# Patient Record
Sex: MALE | Race: OTHER | HISPANIC OR LATINO | URBAN - METROPOLITAN AREA
[De-identification: names, ages, dates, MRNs, and addresses within clinical notes are randomized per-mention and may not be internally consistent; named-entity substitution may affect disease eponyms.]

---

## 2023-05-20 ENCOUNTER — INPATIENT (INPATIENT)
Facility: HOSPITAL | Age: 73
LOS: 12 days | Discharge: NURSING, REHAB OR LONG-TERM CARE | DRG: 236 | End: 2023-06-02
Attending: THORACIC SURGERY (CARDIOTHORACIC VASCULAR SURGERY) | Admitting: THORACIC SURGERY (CARDIOTHORACIC VASCULAR SURGERY)
Payer: MEDICARE

## 2023-05-20 VITALS
HEART RATE: 56 BPM | SYSTOLIC BLOOD PRESSURE: 123 MMHG | RESPIRATION RATE: 18 BRPM | OXYGEN SATURATION: 96 % | DIASTOLIC BLOOD PRESSURE: 80 MMHG | TEMPERATURE: 98 F

## 2023-05-20 DIAGNOSIS — G30.9 ALZHEIMER'S DISEASE, UNSPECIFIED: ICD-10-CM

## 2023-05-20 DIAGNOSIS — E11.65 TYPE 2 DIABETES MELLITUS WITH HYPERGLYCEMIA: ICD-10-CM

## 2023-05-20 DIAGNOSIS — K00.0 ANODONTIA: ICD-10-CM

## 2023-05-20 DIAGNOSIS — Z86.73 PERSONAL HISTORY OF TRANSIENT ISCHEMIC ATTACK (TIA), AND CEREBRAL INFARCTION WITHOUT RESIDUAL DEFICITS: ICD-10-CM

## 2023-05-20 DIAGNOSIS — F05 DELIRIUM DUE TO KNOWN PHYSIOLOGICAL CONDITION: ICD-10-CM

## 2023-05-20 DIAGNOSIS — I25.10 ATHEROSCLEROTIC HEART DISEASE OF NATIVE CORONARY ARTERY WITHOUT ANGINA PECTORIS: ICD-10-CM

## 2023-05-20 DIAGNOSIS — I21.4 NON-ST ELEVATION (NSTEMI) MYOCARDIAL INFARCTION: ICD-10-CM

## 2023-05-20 DIAGNOSIS — F02.80 DEMENTIA IN OTHER DISEASES CLASSIFIED ELSEWHERE, UNSPECIFIED SEVERITY, WITHOUT BEHAVIORAL DISTURBANCE, PSYCHOTIC DISTURBANCE, MOOD DISTURBANCE, AND ANXIETY: ICD-10-CM

## 2023-05-20 DIAGNOSIS — Z87.891 PERSONAL HISTORY OF NICOTINE DEPENDENCE: ICD-10-CM

## 2023-05-20 DIAGNOSIS — E78.5 HYPERLIPIDEMIA, UNSPECIFIED: ICD-10-CM

## 2023-05-20 DIAGNOSIS — Z79.02 LONG TERM (CURRENT) USE OF ANTITHROMBOTICS/ANTIPLATELETS: ICD-10-CM

## 2023-05-20 DIAGNOSIS — E83.42 HYPOMAGNESEMIA: ICD-10-CM

## 2023-05-20 DIAGNOSIS — E87.20 ACIDOSIS, UNSPECIFIED: ICD-10-CM

## 2023-05-20 DIAGNOSIS — R00.1 BRADYCARDIA, UNSPECIFIED: ICD-10-CM

## 2023-05-20 DIAGNOSIS — Z79.4 LONG TERM (CURRENT) USE OF INSULIN: ICD-10-CM

## 2023-05-20 DIAGNOSIS — I10 ESSENTIAL (PRIMARY) HYPERTENSION: ICD-10-CM

## 2023-05-20 DIAGNOSIS — I48.91 UNSPECIFIED ATRIAL FIBRILLATION: ICD-10-CM

## 2023-05-20 DIAGNOSIS — G47.00 INSOMNIA, UNSPECIFIED: ICD-10-CM

## 2023-05-20 LAB
A1C WITH ESTIMATED AVERAGE GLUCOSE RESULT: 10.2 % — HIGH (ref 4–5.6)
ALBUMIN SERPL ELPH-MCNC: 4 G/DL — SIGNIFICANT CHANGE UP (ref 3.3–5)
ALP SERPL-CCNC: 90 U/L — SIGNIFICANT CHANGE UP (ref 40–120)
ALT FLD-CCNC: SIGNIFICANT CHANGE UP (ref 10–45)
ANION GAP SERPL CALC-SCNC: 9 MMOL/L — SIGNIFICANT CHANGE UP (ref 5–17)
APPEARANCE UR: CLEAR — SIGNIFICANT CHANGE UP
APTT BLD: 63.3 SEC — HIGH (ref 27.5–35.5)
APTT BLD: 85.3 SEC — HIGH (ref 27.5–35.5)
AST SERPL-CCNC: SIGNIFICANT CHANGE UP (ref 10–40)
BASOPHILS # BLD AUTO: 0.11 K/UL — SIGNIFICANT CHANGE UP (ref 0–0.2)
BASOPHILS NFR BLD AUTO: 1 % — SIGNIFICANT CHANGE UP (ref 0–2)
BILIRUB SERPL-MCNC: 0.6 MG/DL — SIGNIFICANT CHANGE UP (ref 0.2–1.2)
BILIRUB UR-MCNC: NEGATIVE — SIGNIFICANT CHANGE UP
BLD GP AB SCN SERPL QL: NEGATIVE — SIGNIFICANT CHANGE UP
BLD GP AB SCN SERPL QL: NEGATIVE — SIGNIFICANT CHANGE UP
BUN SERPL-MCNC: 23 MG/DL — SIGNIFICANT CHANGE UP (ref 7–23)
CALCIUM SERPL-MCNC: 9.1 MG/DL — SIGNIFICANT CHANGE UP (ref 8.4–10.5)
CHLORIDE SERPL-SCNC: 105 MMOL/L — SIGNIFICANT CHANGE UP (ref 96–108)
CO2 SERPL-SCNC: 26 MMOL/L — SIGNIFICANT CHANGE UP (ref 22–31)
COLOR SPEC: YELLOW — SIGNIFICANT CHANGE UP
CREAT SERPL-MCNC: 1.1 MG/DL — SIGNIFICANT CHANGE UP (ref 0.5–1.3)
DIFF PNL FLD: NEGATIVE — SIGNIFICANT CHANGE UP
EGFR: 71 ML/MIN/1.73M2 — SIGNIFICANT CHANGE UP
EOSINOPHIL # BLD AUTO: 0.43 K/UL — SIGNIFICANT CHANGE UP (ref 0–0.5)
EOSINOPHIL NFR BLD AUTO: 4.1 % — SIGNIFICANT CHANGE UP (ref 0–6)
ESTIMATED AVERAGE GLUCOSE: 246 MG/DL — HIGH (ref 68–114)
GLUCOSE BLDC GLUCOMTR-MCNC: 282 MG/DL — HIGH (ref 70–99)
GLUCOSE BLDC GLUCOMTR-MCNC: 309 MG/DL — HIGH (ref 70–99)
GLUCOSE SERPL-MCNC: 292 MG/DL — HIGH (ref 70–99)
GLUCOSE UR QL: >=1000
HCT VFR BLD CALC: 49.4 % — SIGNIFICANT CHANGE UP (ref 39–50)
HGB BLD-MCNC: 16.1 G/DL — SIGNIFICANT CHANGE UP (ref 13–17)
IMM GRANULOCYTES NFR BLD AUTO: 0.4 % — SIGNIFICANT CHANGE UP (ref 0–0.9)
INR BLD: 0.93 — SIGNIFICANT CHANGE UP (ref 0.88–1.16)
KETONES UR-MCNC: 40 MG/DL
LEUKOCYTE ESTERASE UR-ACNC: NEGATIVE — SIGNIFICANT CHANGE UP
LYMPHOCYTES # BLD AUTO: 29.6 % — SIGNIFICANT CHANGE UP (ref 13–44)
LYMPHOCYTES # BLD AUTO: 3.12 K/UL — SIGNIFICANT CHANGE UP (ref 1–3.3)
MCHC RBC-ENTMCNC: 29 PG — SIGNIFICANT CHANGE UP (ref 27–34)
MCHC RBC-ENTMCNC: 32.6 GM/DL — SIGNIFICANT CHANGE UP (ref 32–36)
MCV RBC AUTO: 88.8 FL — SIGNIFICANT CHANGE UP (ref 80–100)
MONOCYTES # BLD AUTO: 0.67 K/UL — SIGNIFICANT CHANGE UP (ref 0–0.9)
MONOCYTES NFR BLD AUTO: 6.4 % — SIGNIFICANT CHANGE UP (ref 2–14)
NEUTROPHILS # BLD AUTO: 6.18 K/UL — SIGNIFICANT CHANGE UP (ref 1.8–7.4)
NEUTROPHILS NFR BLD AUTO: 58.5 % — SIGNIFICANT CHANGE UP (ref 43–77)
NITRITE UR-MCNC: NEGATIVE — SIGNIFICANT CHANGE UP
NRBC # BLD: 0 /100 WBCS — SIGNIFICANT CHANGE UP (ref 0–0)
NT-PROBNP SERPL-SCNC: 466 PG/ML — HIGH (ref 0–300)
PH UR: 6 — SIGNIFICANT CHANGE UP (ref 5–8)
PLATELET # BLD AUTO: 221 K/UL — SIGNIFICANT CHANGE UP (ref 150–400)
POTASSIUM SERPL-MCNC: SIGNIFICANT CHANGE UP (ref 3.5–5.3)
POTASSIUM SERPL-SCNC: SIGNIFICANT CHANGE UP (ref 3.5–5.3)
PROT SERPL-MCNC: 6.8 G/DL — SIGNIFICANT CHANGE UP (ref 6–8.3)
PROT UR-MCNC: NEGATIVE MG/DL — SIGNIFICANT CHANGE UP
PROTHROM AB SERPL-ACNC: 11.1 SEC — SIGNIFICANT CHANGE UP (ref 10.5–13.4)
RBC # BLD: 5.56 M/UL — SIGNIFICANT CHANGE UP (ref 4.2–5.8)
RBC # FLD: 13.6 % — SIGNIFICANT CHANGE UP (ref 10.3–14.5)
RH IG SCN BLD-IMP: POSITIVE — SIGNIFICANT CHANGE UP
RH IG SCN BLD-IMP: POSITIVE — SIGNIFICANT CHANGE UP
SODIUM SERPL-SCNC: 140 MMOL/L — SIGNIFICANT CHANGE UP (ref 135–145)
SP GR SPEC: 1.02 — SIGNIFICANT CHANGE UP (ref 1–1.03)
TROPONIN T SERPL-MCNC: 0.34 NG/ML — CRITICAL HIGH (ref 0–0.01)
TSH SERPL-MCNC: 1.2 UIU/ML — SIGNIFICANT CHANGE UP (ref 0.27–4.2)
UROBILINOGEN FLD QL: 1 E.U./DL — SIGNIFICANT CHANGE UP
WBC # BLD: 10.55 K/UL — HIGH (ref 3.8–10.5)
WBC # FLD AUTO: 10.55 K/UL — HIGH (ref 3.8–10.5)

## 2023-05-20 PROCEDURE — 71045 X-RAY EXAM CHEST 1 VIEW: CPT | Mod: 26

## 2023-05-20 PROCEDURE — 70450 CT HEAD/BRAIN W/O DYE: CPT | Mod: 26

## 2023-05-20 RX ORDER — DEXTROSE 50 % IN WATER 50 %
15 SYRINGE (ML) INTRAVENOUS ONCE
Refills: 0 | Status: DISCONTINUED | OUTPATIENT
Start: 2023-05-20 | End: 2023-05-24

## 2023-05-20 RX ORDER — HEPARIN SODIUM 5000 [USP'U]/ML
5000 INJECTION INTRAVENOUS; SUBCUTANEOUS EVERY 8 HOURS
Refills: 0 | Status: DISCONTINUED | OUTPATIENT
Start: 2023-05-20 | End: 2023-05-20

## 2023-05-20 RX ORDER — ATORVASTATIN CALCIUM 80 MG/1
40 TABLET, FILM COATED ORAL AT BEDTIME
Refills: 0 | Status: DISCONTINUED | OUTPATIENT
Start: 2023-05-20 | End: 2023-05-24

## 2023-05-20 RX ORDER — INSULIN GLARGINE 100 [IU]/ML
10 INJECTION, SOLUTION SUBCUTANEOUS ONCE
Refills: 0 | Status: DISCONTINUED | OUTPATIENT
Start: 2023-05-20 | End: 2023-05-21

## 2023-05-20 RX ORDER — INSULIN LISPRO 100/ML
3 VIAL (ML) SUBCUTANEOUS
Refills: 0 | Status: DISCONTINUED | OUTPATIENT
Start: 2023-05-20 | End: 2023-05-21

## 2023-05-20 RX ORDER — DEXTROSE 50 % IN WATER 50 %
12.5 SYRINGE (ML) INTRAVENOUS ONCE
Refills: 0 | Status: DISCONTINUED | OUTPATIENT
Start: 2023-05-20 | End: 2023-05-24

## 2023-05-20 RX ORDER — ASPIRIN/CALCIUM CARB/MAGNESIUM 324 MG
81 TABLET ORAL DAILY
Refills: 0 | Status: DISCONTINUED | OUTPATIENT
Start: 2023-05-20 | End: 2023-05-24

## 2023-05-20 RX ORDER — INSULIN GLARGINE 100 [IU]/ML
10 INJECTION, SOLUTION SUBCUTANEOUS AT BEDTIME
Refills: 0 | Status: DISCONTINUED | OUTPATIENT
Start: 2023-05-20 | End: 2023-05-20

## 2023-05-20 RX ORDER — PANTOPRAZOLE SODIUM 20 MG/1
40 TABLET, DELAYED RELEASE ORAL
Refills: 0 | Status: DISCONTINUED | OUTPATIENT
Start: 2023-05-20 | End: 2023-05-24

## 2023-05-20 RX ORDER — HEPARIN SODIUM 5000 [USP'U]/ML
680 INJECTION INTRAVENOUS; SUBCUTANEOUS
Qty: 25000 | Refills: 0 | Status: DISCONTINUED | OUTPATIENT
Start: 2023-05-20 | End: 2023-05-24

## 2023-05-20 RX ORDER — SODIUM CHLORIDE 9 MG/ML
1000 INJECTION, SOLUTION INTRAVENOUS
Refills: 0 | Status: DISCONTINUED | OUTPATIENT
Start: 2023-05-20 | End: 2023-05-24

## 2023-05-20 RX ORDER — SODIUM CHLORIDE 9 MG/ML
3 INJECTION INTRAMUSCULAR; INTRAVENOUS; SUBCUTANEOUS EVERY 8 HOURS
Refills: 0 | Status: DISCONTINUED | OUTPATIENT
Start: 2023-05-20 | End: 2023-05-24

## 2023-05-20 RX ORDER — INSULIN GLARGINE 100 [IU]/ML
20 INJECTION, SOLUTION SUBCUTANEOUS AT BEDTIME
Refills: 0 | Status: DISCONTINUED | OUTPATIENT
Start: 2023-05-21 | End: 2023-05-21

## 2023-05-20 RX ORDER — DEXTROSE 50 % IN WATER 50 %
25 SYRINGE (ML) INTRAVENOUS ONCE
Refills: 0 | Status: DISCONTINUED | OUTPATIENT
Start: 2023-05-20 | End: 2023-05-24

## 2023-05-20 RX ORDER — GLUCAGON INJECTION, SOLUTION 0.5 MG/.1ML
1 INJECTION, SOLUTION SUBCUTANEOUS ONCE
Refills: 0 | Status: DISCONTINUED | OUTPATIENT
Start: 2023-05-20 | End: 2023-05-24

## 2023-05-20 RX ORDER — NITROGLYCERIN 6.5 MG
0.4 CAPSULE, EXTENDED RELEASE ORAL
Refills: 0 | Status: DISCONTINUED | OUTPATIENT
Start: 2023-05-20 | End: 2023-05-24

## 2023-05-20 RX ORDER — ISOSORBIDE MONONITRATE 60 MG/1
30 TABLET, EXTENDED RELEASE ORAL DAILY
Refills: 0 | Status: DISCONTINUED | OUTPATIENT
Start: 2023-05-20 | End: 2023-05-24

## 2023-05-20 RX ORDER — BNT162B2 ORIGINAL AND OMICRON BA.4/BA.5 .1125; .1125 MG/2.25ML; MG/2.25ML
0.3 INJECTION, SUSPENSION INTRAMUSCULAR ONCE
Refills: 0 | Status: DISCONTINUED | OUTPATIENT
Start: 2023-05-20 | End: 2023-05-20

## 2023-05-20 RX ORDER — ISOSORBIDE MONONITRATE 60 MG/1
30 TABLET, EXTENDED RELEASE ORAL DAILY
Refills: 0 | Status: DISCONTINUED | OUTPATIENT
Start: 2023-05-20 | End: 2023-05-20

## 2023-05-20 RX ADMIN — INSULIN GLARGINE 10 UNIT(S): 100 INJECTION, SOLUTION SUBCUTANEOUS at 22:18

## 2023-05-20 RX ADMIN — ATORVASTATIN CALCIUM 40 MILLIGRAM(S): 80 TABLET, FILM COATED ORAL at 21:51

## 2023-05-20 RX ADMIN — ISOSORBIDE MONONITRATE 30 MILLIGRAM(S): 60 TABLET, EXTENDED RELEASE ORAL at 19:14

## 2023-05-20 RX ADMIN — SODIUM CHLORIDE 3 MILLILITER(S): 9 INJECTION INTRAMUSCULAR; INTRAVENOUS; SUBCUTANEOUS at 22:07

## 2023-05-20 NOTE — H&P ADULT - NSHPSOCIALHISTORY_GEN_ALL_CORE
Former smoker quit 20 years ago, smoked 1PPD x 10-15 years ago   former social etoh  ambulates without walker/cane

## 2023-05-20 NOTE — H&P ADULT - NSHPREVIEWOFSYSTEMS_GEN_ALL_CORE
Review of Systems  CONSTITUTIONAL:  Denies Fevers / chills, sweats, fatigue, weight loss, weight gain                                      NEURO:  Denies parathesias, seizures, syncope, confusion                                                                                EYES:  Denies Blurry vision, discharge, pain, loss of vision                                                                                    ENMT:  Denies Difficulty hearing, vertigo, dysphagia, epistaxis, recent dental work                                       CV:  Denies Chest pain, palpitations, BRYSON, orthopnea                                                                                          RESPIRATORY:  Denies Wheezing, SOB, cough / sputum, hemoptysis                                                                GI:  Denies Nausea, vommiting, diarrhea, constipation, melena, difficulty swallowing                                               : Denies Hematuria, dysuria, urgency, incontinence                                                                                         MUSKULOSKELETAL:  Denies arthritis, joint swelling, muscle weakness                                                             SKIN/BREAST:  Denies rash, itching, betty loss, masses                                                                                            PSYCH:  Denies depresion, anxiety, suicidal ideation                                                                                               HEME/LYMPH:  Denies bruises easily, enlarged lymph nodes, tender lymph nodes                                        ENDOCRINE:  Denies cold intolerance, heat intolerance, polydipsia

## 2023-05-20 NOTE — PATIENT PROFILE ADULT - FALL HARM RISK - HARM RISK INTERVENTIONS
Communicate Risk of Fall with Harm to all staff/Reinforce activity limits and safety measures with patient and family/Tailored Fall Risk Interventions/Visual Cue: Yellow wristband and red socks/Bed in lowest position, wheels locked, appropriate side rails in place/Call bell, personal items and telephone in reach/Instruct patient to call for assistance before getting out of bed or chair/Non-slip footwear when patient is out of bed/[place] to call system/Physically safe environment - no spills, clutter or unnecessary equipment/Purposeful Proactive Rounding/Room/bathroom lighting operational, light cord in reach Purse String (Simple) Text: Given the location of the defect and the characteristics of the surrounding skin a purse string closure was deemed most appropriate.  Undermining was performed circumfirentially around the surgical defect.  A purse string suture was then placed and tightened.

## 2023-05-20 NOTE — H&P ADULT - HISTORY OF PRESENT ILLNESS
66yo M former smoker  with PMHx of HTN, HLD, DM, CVA (2106) no residual deficits, Alzheimers, who presented to  with CP at rest. Pt's wife states he ambulated minimally around the house and develops SOB after walking 1 block. At [facility] he was r/i NSTEMI and underwent cardiac cath revealing mv CAD. He was started on a heparin gtt and transferred to [facility] under Dr Martinez for pre-op CABG workup. Denies GI bleed, hx of CA, radiation, varicose veins, vein stripping.     Of note CTA brain showed diminutive R ICA without stenosis.,

## 2023-05-20 NOTE — H&P ADULT - ASSESSMENT
66yo M former smoker  with PMHx of HTN, HLD, DM, CVA (2106) no residual deficits, Alzheimers, who presented to  with CP at rest. Pt's wife states he ambulated minimally around the house and develops SOB after walking 1 block. At [facility] he was r/i NSTEMI and underwent cardiac cath revealing mv CAD. He was started on a heparin gtt and transferred to [facility] under Dr Martinez for pre-op CABG workup.    Neuro:   - Alzheimer's disease, alert and oriented but forgetful  - Previous CVA no residual deficit  - CTA brain 2016 showed diminutive R ICA without stenosis.,   - Carotid dopplers     Respiratory  - Oxygenating well on ra   - CXR showing clear lung fields  - PFTS  - Encourage ambulation C+DB and use of IS 10x / hr while awake    Cardiac  -mv CAD +NSTEMI, conitnue asp,statin, BB, heparin gtt  - HTN: Continue BB and imdur titrate as needed  - HLD: statin   - monitor r/bp/tele    GI:   - DASH/CC diet   - Continue bowel regimen  - GI PPX with protonix     Renal/:   - Monitor renal function BUN/Cr 23/1.1  - Monitor I/Os  - Replete K<4.0, Mg<2.0    Heme:  - H&H stable 16/49, continue to monitor   - DVT ppx with hep gtt     Endocrinology  - A1C 10, needs endo consult started lantus 10, lispro 3 TID  - TSH WNL      ID: afebrile WBC 10  - f/u UA   - Observe for SIRS/Sepsis Syndrome    Dispo: Home when medically cleared

## 2023-05-20 NOTE — H&P ADULT - NSHPPHYSICALEXAM_GEN_ALL_CORE
General: Lying in bed  NAD  HEENT: NCAT MMM EOMI neck supple   Neurological: A&O x3, no focal deficits, strength 5/5 throughout in UE and LE   Cardiovascular: RRR, normal s1 s2, no M/R/G  Respiratory:  CTA b/l, no W/R/R  Gastrointestinal: soft, non-tender to palpation, non-distended  Extremities: WWP, no pitting edema, no calf tenderness  Vascular: , 2+DP pulses b/l  Incisions: R radial cath site CDI no hematoma

## 2023-05-20 NOTE — PATIENT PROFILE ADULT - PATIENT'S SEXUAL ORIENTATION
11/30/2020      Nasim Lawrence  [address]  [facility] [address]      To: Jury Manager    Nasim Lawrence is an established patient with Raul Washington MD. Please excuse Nasim Lawrence from jury duty due to medical reasons.              Raul Washington MD   [address]  [place], [place] [id number] [phone]     Withheld/decline to answer

## 2023-05-20 NOTE — H&P ADULT - NSICDXPASTMEDICALHX_GEN_ALL_CORE_FT
PAST MEDICAL HISTORY:  AD (Alzheimer's disease)     CVA (cerebrovascular accident)     Diabetes     HLD (hyperlipidemia)     HTN (hypertension)

## 2023-05-21 LAB
ALBUMIN SERPL ELPH-MCNC: 3.8 G/DL — SIGNIFICANT CHANGE UP (ref 3.3–5)
ALP SERPL-CCNC: 89 U/L — SIGNIFICANT CHANGE UP (ref 40–120)
ALT FLD-CCNC: 19 U/L — SIGNIFICANT CHANGE UP (ref 10–45)
ANION GAP SERPL CALC-SCNC: 11 MMOL/L — SIGNIFICANT CHANGE UP (ref 5–17)
APTT BLD: 82.6 SEC — HIGH (ref 27.5–35.5)
AST SERPL-CCNC: 23 U/L — SIGNIFICANT CHANGE UP (ref 10–40)
BASOPHILS # BLD AUTO: 0.12 K/UL — SIGNIFICANT CHANGE UP (ref 0–0.2)
BASOPHILS NFR BLD AUTO: 1.3 % — SIGNIFICANT CHANGE UP (ref 0–2)
BILIRUB SERPL-MCNC: 0.5 MG/DL — SIGNIFICANT CHANGE UP (ref 0.2–1.2)
BUN SERPL-MCNC: 26 MG/DL — HIGH (ref 7–23)
CALCIUM SERPL-MCNC: 8.9 MG/DL — SIGNIFICANT CHANGE UP (ref 8.4–10.5)
CHLORIDE SERPL-SCNC: 106 MMOL/L — SIGNIFICANT CHANGE UP (ref 96–108)
CHOLEST SERPL-MCNC: 91 MG/DL — SIGNIFICANT CHANGE UP
CO2 SERPL-SCNC: 22 MMOL/L — SIGNIFICANT CHANGE UP (ref 22–31)
CREAT SERPL-MCNC: 1.13 MG/DL — SIGNIFICANT CHANGE UP (ref 0.5–1.3)
EGFR: 69 ML/MIN/1.73M2 — SIGNIFICANT CHANGE UP
EOSINOPHIL # BLD AUTO: 0.49 K/UL — SIGNIFICANT CHANGE UP (ref 0–0.5)
EOSINOPHIL NFR BLD AUTO: 5.2 % — SIGNIFICANT CHANGE UP (ref 0–6)
GLUCOSE BLDC GLUCOMTR-MCNC: 210 MG/DL — HIGH (ref 70–99)
GLUCOSE BLDC GLUCOMTR-MCNC: 237 MG/DL — HIGH (ref 70–99)
GLUCOSE BLDC GLUCOMTR-MCNC: 249 MG/DL — HIGH (ref 70–99)
GLUCOSE BLDC GLUCOMTR-MCNC: 261 MG/DL — HIGH (ref 70–99)
GLUCOSE SERPL-MCNC: 287 MG/DL — HIGH (ref 70–99)
HCT VFR BLD CALC: 47 % — SIGNIFICANT CHANGE UP (ref 39–50)
HCV AB S/CO SERPL IA: 0.09 S/CO — SIGNIFICANT CHANGE UP (ref 0–0.99)
HCV AB SERPL-IMP: SIGNIFICANT CHANGE UP
HDLC SERPL-MCNC: 38 MG/DL — LOW
HGB BLD-MCNC: 15.2 G/DL — SIGNIFICANT CHANGE UP (ref 13–17)
IMM GRANULOCYTES NFR BLD AUTO: 0.4 % — SIGNIFICANT CHANGE UP (ref 0–0.9)
LIPID PNL WITH DIRECT LDL SERPL: 37 MG/DL — SIGNIFICANT CHANGE UP
LYMPHOCYTES # BLD AUTO: 3.3 K/UL — SIGNIFICANT CHANGE UP (ref 1–3.3)
LYMPHOCYTES # BLD AUTO: 34.8 % — SIGNIFICANT CHANGE UP (ref 13–44)
MCHC RBC-ENTMCNC: 29.1 PG — SIGNIFICANT CHANGE UP (ref 27–34)
MCHC RBC-ENTMCNC: 32.3 GM/DL — SIGNIFICANT CHANGE UP (ref 32–36)
MCV RBC AUTO: 90 FL — SIGNIFICANT CHANGE UP (ref 80–100)
MONOCYTES # BLD AUTO: 0.54 K/UL — SIGNIFICANT CHANGE UP (ref 0–0.9)
MONOCYTES NFR BLD AUTO: 5.7 % — SIGNIFICANT CHANGE UP (ref 2–14)
NEUTROPHILS # BLD AUTO: 4.99 K/UL — SIGNIFICANT CHANGE UP (ref 1.8–7.4)
NEUTROPHILS NFR BLD AUTO: 52.6 % — SIGNIFICANT CHANGE UP (ref 43–77)
NON HDL CHOLESTEROL: 53 MG/DL — SIGNIFICANT CHANGE UP
NRBC # BLD: 0 /100 WBCS — SIGNIFICANT CHANGE UP (ref 0–0)
PA ADP PRP-ACNC: 72 PRU — LOW (ref 194–418)
PLATELET # BLD AUTO: 218 K/UL — SIGNIFICANT CHANGE UP (ref 150–400)
POTASSIUM SERPL-MCNC: 4.3 MMOL/L — SIGNIFICANT CHANGE UP (ref 3.5–5.3)
POTASSIUM SERPL-SCNC: 4.3 MMOL/L — SIGNIFICANT CHANGE UP (ref 3.5–5.3)
PROT SERPL-MCNC: 6.5 G/DL — SIGNIFICANT CHANGE UP (ref 6–8.3)
RBC # BLD: 5.22 M/UL — SIGNIFICANT CHANGE UP (ref 4.2–5.8)
RBC # FLD: 13.6 % — SIGNIFICANT CHANGE UP (ref 10.3–14.5)
SODIUM SERPL-SCNC: 139 MMOL/L — SIGNIFICANT CHANGE UP (ref 135–145)
TRIGL SERPL-MCNC: 81 MG/DL — SIGNIFICANT CHANGE UP
TROPONIN T SERPL-MCNC: 0.35 NG/ML — CRITICAL HIGH (ref 0–0.01)
WBC # BLD: 9.48 K/UL — SIGNIFICANT CHANGE UP (ref 3.8–10.5)
WBC # FLD AUTO: 9.48 K/UL — SIGNIFICANT CHANGE UP (ref 3.8–10.5)

## 2023-05-21 PROCEDURE — 99232 SBSQ HOSP IP/OBS MODERATE 35: CPT

## 2023-05-21 PROCEDURE — 93880 EXTRACRANIAL BILAT STUDY: CPT | Mod: 26

## 2023-05-21 RX ORDER — INSULIN LISPRO 100/ML
10 VIAL (ML) SUBCUTANEOUS
Refills: 0 | Status: DISCONTINUED | OUTPATIENT
Start: 2023-05-21 | End: 2023-05-24

## 2023-05-21 RX ORDER — INSULIN LISPRO 100/ML
VIAL (ML) SUBCUTANEOUS
Refills: 0 | Status: DISCONTINUED | OUTPATIENT
Start: 2023-05-21 | End: 2023-05-24

## 2023-05-21 RX ORDER — SENNA PLUS 8.6 MG/1
2 TABLET ORAL AT BEDTIME
Refills: 0 | Status: DISCONTINUED | OUTPATIENT
Start: 2023-05-21 | End: 2023-05-24

## 2023-05-21 RX ORDER — INSULIN LISPRO 100/ML
5 VIAL (ML) SUBCUTANEOUS
Refills: 0 | Status: DISCONTINUED | OUTPATIENT
Start: 2023-05-21 | End: 2023-05-21

## 2023-05-21 RX ORDER — INSULIN GLARGINE 100 [IU]/ML
30 INJECTION, SOLUTION SUBCUTANEOUS AT BEDTIME
Refills: 0 | Status: DISCONTINUED | OUTPATIENT
Start: 2023-05-21 | End: 2023-05-24

## 2023-05-21 RX ADMIN — INSULIN GLARGINE 10 UNIT(S): 100 INJECTION, SOLUTION SUBCUTANEOUS at 00:16

## 2023-05-21 RX ADMIN — HEPARIN SODIUM 6.8 UNIT(S)/HR: 5000 INJECTION INTRAVENOUS; SUBCUTANEOUS at 09:04

## 2023-05-21 RX ADMIN — SODIUM CHLORIDE 3 MILLILITER(S): 9 INJECTION INTRAMUSCULAR; INTRAVENOUS; SUBCUTANEOUS at 06:06

## 2023-05-21 RX ADMIN — SODIUM CHLORIDE 3 MILLILITER(S): 9 INJECTION INTRAMUSCULAR; INTRAVENOUS; SUBCUTANEOUS at 22:58

## 2023-05-21 RX ADMIN — Medication 3 UNIT(S): at 07:21

## 2023-05-21 RX ADMIN — Medication 10 UNIT(S): at 17:03

## 2023-05-21 RX ADMIN — INSULIN GLARGINE 30 UNIT(S): 100 INJECTION, SOLUTION SUBCUTANEOUS at 22:58

## 2023-05-21 RX ADMIN — SODIUM CHLORIDE 3 MILLILITER(S): 9 INJECTION INTRAMUSCULAR; INTRAVENOUS; SUBCUTANEOUS at 13:35

## 2023-05-21 RX ADMIN — PANTOPRAZOLE SODIUM 40 MILLIGRAM(S): 20 TABLET, DELAYED RELEASE ORAL at 07:21

## 2023-05-21 RX ADMIN — ISOSORBIDE MONONITRATE 30 MILLIGRAM(S): 60 TABLET, EXTENDED RELEASE ORAL at 12:10

## 2023-05-21 RX ADMIN — Medication 81 MILLIGRAM(S): at 12:10

## 2023-05-21 RX ADMIN — Medication 4: at 17:03

## 2023-05-21 RX ADMIN — Medication 10 UNIT(S): at 12:09

## 2023-05-21 RX ADMIN — ATORVASTATIN CALCIUM 40 MILLIGRAM(S): 80 TABLET, FILM COATED ORAL at 22:47

## 2023-05-21 NOTE — PROGRESS NOTE ADULT - SUBJECTIVE AND OBJECTIVE BOX
Patient discussed on morning rounds with Dr. Lpiscomb     Operation / Date: Pre-op CABG     SUBJECTIVE ASSESSMENT:  72y Male  Seen and examined at the bedside. He does not remember that he was at [facility] or that he needs heart surgery. He is comfortable, denies CP, sob, palpitations, abd pain, n/v         Vital Signs Last 24 Hrs  T(C): 35.7 (21 May 2023 05:01), Max: 36.6 (20 May 2023 14:04)  T(F): 96.3 (21 May 2023 05:01), Max: 97.8 (20 May 2023 14:04)  HR: 72 (21 May 2023 08:41) (48 - 72)  BP: 99/55 (21 May 2023 08:41) (99/55 - 124/58)  BP(mean): 71 (21 May 2023 08:41) (71 - 95)  RR: 16 (21 May 2023 08:41) (16 - 18)  SpO2: 94% (21 May 2023 08:41) (94% - 96%)    Parameters below as of 21 May 2023 08:41  Patient On (Oxygen Delivery Method): room air      I&O's Detail    20 May 2023 07:01  -  21 May 2023 07:00  --------------------------------------------------------  IN:    Heparin: 74.8 mL  Total IN: 74.8 mL    OUT:  Total OUT: 0 mL    Total NET: 74.8 mL      21 May 2023 07:01  -  21 May 2023 10:45  --------------------------------------------------------  IN:    Heparin: 6.8 mL  Total IN: 6.8 mL    OUT:  Total OUT: 0 mL    Total NET: 6.8 mL          CHEST TUBE:  no  LYNETTE DRAIN:  No.  EPICARDIAL WIRES: No.  TIE DOWNS: No.  AVILEZ: No.    PHYSICAL EXAM:  General: Laying in bed NAD  HEENT: NCAT MMM EOMI neck supple   Neurological: forgetful this morning about recent events, otherwise alert and oriented, RÍOS   Cardiovascular: RRR, normal s1 s2, no M/R/G  Respiratory: CTA b/l, no W/R/R  Gastrointestinal:  soft, non-tender to palpation, non-distended  Extremities: WWP, no pitting edema, no calf tenderness  Vascular: 2+radial pulses b/l  Skin: No rash     LABS:                        15.2   9.48  )-----------( 218      ( 21 May 2023 07:05 )             47.0       COUMADIN:  no  PT/INR - ( 20 May 2023 14:21 )   PT: 11.1 sec;   INR: 0.93          PTT - ( 21 May 2023 06:05 )  PTT:82.6 sec        139  |  106  |  26<H>  ----------------------------<  287<H>  4.3   |  22  |  1.13    Ca    8.9      21 May 2023 07:05    TPro  6.5  /  Alb  3.8  /  TBili  0.5  /  DBili  x   /  AST  23  /  ALT  19  /  AlkPhos  89  -21      Urinalysis Basic - ( 20 May 2023 14:21 )    Color: Yellow / Appearance: Clear / S.020 / pH: x  Gluc: x / Ketone: 40 mg/dL  / Bili: Negative / Urobili: 1.0 E.U./dL   Blood: x / Protein: NEGATIVE mg/dL / Nitrite: NEGATIVE   Leuk Esterase: NEGATIVE / RBC: x / WBC x   Sq Epi: x / Non Sq Epi: x / Bacteria: x        MEDICATIONS  (STANDING):  aspirin enteric coated 81 milliGRAM(s) Oral daily  atorvastatin 40 milliGRAM(s) Oral at bedtime  dextrose 5%. 1000 milliLiter(s) (100 mL/Hr) IV Continuous <Continuous>  dextrose 5%. 1000 milliLiter(s) (50 mL/Hr) IV Continuous <Continuous>  dextrose 50% Injectable 25 Gram(s) IV Push once  dextrose 50% Injectable 12.5 Gram(s) IV Push once  dextrose 50% Injectable 25 Gram(s) IV Push once  glucagon  Injectable 1 milliGRAM(s) IntraMuscular once  heparin  Infusion 680 Unit(s)/Hr (6.8 mL/Hr) IV Continuous <Continuous>  insulin glargine Injectable (LANTUS) 20 Unit(s) SubCutaneous at bedtime  insulin lispro Injectable (ADMELOG) 5 Unit(s) SubCutaneous three times a day before meals  isosorbide   mononitrate ER Tablet (IMDUR) 30 milliGRAM(s) Oral daily  pantoprazole    Tablet 40 milliGRAM(s) Oral before breakfast  sodium chloride 0.9% lock flush 3 milliLiter(s) IV Push every 8 hours    MEDICATIONS  (PRN):  dextrose Oral Gel 15 Gram(s) Oral once PRN Blood Glucose LESS THAN 70 milliGRAM(s)/deciliter  nitroglycerin     SubLingual 0.4 milliGRAM(s) SubLingual every 5 minutes PRN Chest Pain        RADIOLOGY & ADDITIONAL TESTS:    < from: CT Head No Cont (23 @ 17:12) >  Impression: No acute intracranial injury. Right frontal chronic   infarction. Right occipital chronic infarction. Tiny left superior   cerebellar chronic infarct. Mild to moderate microvascular disease.    < end of copied text >

## 2023-05-21 NOTE — CONSULT NOTE ADULT - ASSESSMENT
A/P:  Uncontrolled DM2, w/ A1C at 10.2%. Plans for pre-CABG evaluation  1) Increase LAntus to 30 units daily, increase Lispro to 10 units TIDAC, hold if not eating + NISS  2) Endocrine to follow    T [id number]

## 2023-05-21 NOTE — CONSULT NOTE ADULT - SUBJECTIVE AND OBJECTIVE BOX
73 y/o M former smoker  with PMHx of HTN, HLD, DM, CVA (2106) no residual deficits, Alzheimers, who presented to  with CP at rest. Pt's wife states he ambulated minimally around the house and develops SOB after walking 1 block. At [facility] he was r/i NSTEMI and underwent cardiac cath revealing mv CAD. He was started on a heparin gtt and transferred to [facility] under Dr Martinez for pre-op CABG workup. Pt is unable to provide history due to dementia, he is aware of hx of diabetes and insulin use. Family provides insulin and meds at home. Last regimen at home consisted of Levemir 50 units QHS + Novolog SS. Reports conserved appetite and no present digestive issues. Consumed whole food tray today. Otherwise denies any f/c, CP, SOB, palpitations, N/V, abd tenderness or other concerns. Plan for CABG this week. Provided w/ LAntus 20 units last night and 5 units of lispro before breakfast    PAST MEDICAL & SURGICAL HISTORY:  CVA (cerebrovascular accident)      HTN (hypertension)      HLD (hyperlipidemia)      Diabetes      AD (Alzheimer's disease)      Home Medications:  atorvastatin 40 mg oral tablet: 1 orally once a day (20 May 2023 16:02)  donepezil 10 mg oral tablet: 1 orally every 12 hours (20 May 2023 16:02)  Imdur 30 mg oral tablet, extended release: 1 orally once a day (20 May 2023 15:46)  Levemir 100 units/mL subcutaneous solution: 0.5 subcutaneous once a day (20 May 2023 16:02)  NovoLOG 100 units/mL injectable solution: injectable (20 May 2023 16:04)  Plavix 75 mg oral tablet: 1 orally once a day (20 May 2023 16:02)  telmisartan-hydrochlorothiazide 80 mg-12.5 mg oral tablet: 1 orally once a day (20 May 2023 15:48)  Vascepa 1 g oral capsule: 2 orally every 12 hours (20 May 2023 15:48)  Xigduo XR 10 mg-500 mg oral tablet, extended release: 1 orally once a day (20 May 2023 15:48)    PE  A&Ox 2, NAD, Heart w/o MRG, Abd NTND, no lipodystrophy and insulin injection sites  Vital Signs Last 24 Hrs  T(C): 36.4 (21 May 2023 17:12), Max: 36.4 (21 May 2023 17:12)  T(F): 97.6 (21 May 2023 17:12), Max: 97.6 (21 May 2023 17:12)  HR: 68 (21 May 2023 18:07) (56 - 72)  BP: 103/51 (21 May 2023 18:07) (92/61 - 117/58)  BP(mean): 70 (21 May 2023 18:07) (69 - 81)  RR: 17 (21 May 2023 18:07) (16 - 18)  SpO2: 96% (21 May 2023 18:07) (93% - 96%)    Parameters below as of 21 May 2023 18:07  Patient On (Oxygen Delivery Method): room air    CAPILLARY BLOOD GLUCOSE      POCT Blood Glucose.: 210 mg/dL (21 May 2023 16:35)  POCT Blood Glucose.: 249 mg/dL (21 May 2023 11:51)  POCT Blood Glucose.: 237 mg/dL (21 May 2023 07:06)  POCT Blood Glucose.: 282 mg/dL (20 May 2023 22:58)  POCT Blood Glucose.: 309 mg/dL (20 May 2023 22:17)    MEDICATIONS  (STANDING):  aspirin enteric coated 81 milliGRAM(s) Oral daily  atorvastatin 40 milliGRAM(s) Oral at bedtime  dextrose 5%. 1000 milliLiter(s) (100 mL/Hr) IV Continuous <Continuous>  dextrose 5%. 1000 milliLiter(s) (50 mL/Hr) IV Continuous <Continuous>  dextrose 50% Injectable 25 Gram(s) IV Push once  dextrose 50% Injectable 12.5 Gram(s) IV Push once  dextrose 50% Injectable 25 Gram(s) IV Push once  glucagon  Injectable 1 milliGRAM(s) IntraMuscular once  heparin  Infusion 680 Unit(s)/Hr (6.8 mL/Hr) IV Continuous <Continuous>  insulin glargine Injectable (LANTUS) 30 Unit(s) SubCutaneous at bedtime  insulin lispro (ADMELOG) corrective regimen sliding scale   SubCutaneous three times a day before meals  insulin lispro Injectable (ADMELOG) 10 Unit(s) SubCutaneous three times a day before meals  isosorbide   mononitrate ER Tablet (IMDUR) 30 milliGRAM(s) Oral daily  pantoprazole    Tablet 40 milliGRAM(s) Oral before breakfast  sodium chloride 0.9% lock flush 3 milliLiter(s) IV Push every 8 hours

## 2023-05-21 NOTE — PROGRESS NOTE ADULT - ASSESSMENT
66yo M former smoker  with PMHx of HTN, HLD, DM, CVA (2106) no residual deficits, Alzheimers, who presented to  with CP at rest. He was r/i NSTEMI and underwent cardiac cath revealing mv CAD. He was started on a heparin gtt and transferred to [facility] under Dr Martinez for pre-op CABG workup.    Neuro:   - Alzheimer's disease, alert and oriented but forgetful  - Previous CVA no residual deficit, CT head with chronic right occipital, right frontal and left cerebellar infarcts  - CTA brain 2016 showed diminutive R ICA without stenosis.,   - Carotid dopplers     Respiratory  - Oxygenating well on ra 95%  - CXR showing clear lung fields  - PFTS  - Encourage ambulation C+DB and use of IS 10x / hr while awake    Cardiac  - mv CAD +NSTEMI, conitnue asp,statin, heparin gtt. no BB started secondary to baseline sinus bradycardia   - HTN: Continue  imdur titrate as needed  - HLD: statin   - monitor r/bp/tele    GI:   - DASH/CC diet   - Continue bowel regimen  - GI PPX with protonix     Renal/:   - Monitor renal function BUN/Cr 23/1.1  - Monitor I/Os  - Replete K<4.0, Mg<2.0    Heme:  - H&H stable 15/47, continue to monitor   - DVT ppx with hep gtt     Endocrinology  - A1C 10, Endo consult. Placed on lantus 20, lispro 5 TID until further reccs   - TSH WNL      ID: afebrile WBC 9  - UA negative  - Observe for SIRS/Sepsis Syndrome    Dispo: Home when medically cleared

## 2023-05-22 DIAGNOSIS — E11.9 TYPE 2 DIABETES MELLITUS WITHOUT COMPLICATIONS: ICD-10-CM

## 2023-05-22 LAB
ANION GAP SERPL CALC-SCNC: 9 MMOL/L — SIGNIFICANT CHANGE UP (ref 5–17)
APTT BLD: 65.1 SEC — HIGH (ref 27.5–35.5)
APTT BLD: 95.9 SEC — HIGH (ref 27.5–35.5)
BUN SERPL-MCNC: 28 MG/DL — HIGH (ref 7–23)
CALCIUM SERPL-MCNC: 8.4 MG/DL — SIGNIFICANT CHANGE UP (ref 8.4–10.5)
CHLORIDE SERPL-SCNC: 107 MMOL/L — SIGNIFICANT CHANGE UP (ref 96–108)
CO2 SERPL-SCNC: 24 MMOL/L — SIGNIFICANT CHANGE UP (ref 22–31)
CREAT SERPL-MCNC: 1.07 MG/DL — SIGNIFICANT CHANGE UP (ref 0.5–1.3)
EGFR: 73 ML/MIN/1.73M2 — SIGNIFICANT CHANGE UP
GLUCOSE BLDC GLUCOMTR-MCNC: 144 MG/DL — HIGH (ref 70–99)
GLUCOSE BLDC GLUCOMTR-MCNC: 149 MG/DL — HIGH (ref 70–99)
GLUCOSE BLDC GLUCOMTR-MCNC: 157 MG/DL — HIGH (ref 70–99)
GLUCOSE BLDC GLUCOMTR-MCNC: 183 MG/DL — HIGH (ref 70–99)
GLUCOSE SERPL-MCNC: 176 MG/DL — HIGH (ref 70–99)
HCT VFR BLD CALC: 46.4 % — SIGNIFICANT CHANGE UP (ref 39–50)
HGB BLD-MCNC: 15.1 G/DL — SIGNIFICANT CHANGE UP (ref 13–17)
INR BLD: 0.88 — SIGNIFICANT CHANGE UP (ref 0.88–1.16)
MAGNESIUM SERPL-MCNC: 2.2 MG/DL — SIGNIFICANT CHANGE UP (ref 1.6–2.6)
MCHC RBC-ENTMCNC: 29.3 PG — SIGNIFICANT CHANGE UP (ref 27–34)
MCHC RBC-ENTMCNC: 32.5 GM/DL — SIGNIFICANT CHANGE UP (ref 32–36)
MCV RBC AUTO: 89.9 FL — SIGNIFICANT CHANGE UP (ref 80–100)
NRBC # BLD: 0 /100 WBCS — SIGNIFICANT CHANGE UP (ref 0–0)
PA ADP PRP-ACNC: 177 PRU — LOW (ref 194–418)
PLATELET # BLD AUTO: 218 K/UL — SIGNIFICANT CHANGE UP (ref 150–400)
POTASSIUM SERPL-MCNC: 3.8 MMOL/L — SIGNIFICANT CHANGE UP (ref 3.5–5.3)
POTASSIUM SERPL-SCNC: 3.8 MMOL/L — SIGNIFICANT CHANGE UP (ref 3.5–5.3)
PROTHROM AB SERPL-ACNC: 10.5 SEC — SIGNIFICANT CHANGE UP (ref 10.5–13.4)
RBC # BLD: 5.16 M/UL — SIGNIFICANT CHANGE UP (ref 4.2–5.8)
RBC # FLD: 13.3 % — SIGNIFICANT CHANGE UP (ref 10.3–14.5)
SODIUM SERPL-SCNC: 140 MMOL/L — SIGNIFICANT CHANGE UP (ref 135–145)
WBC # BLD: 9.55 K/UL — SIGNIFICANT CHANGE UP (ref 3.8–10.5)
WBC # FLD AUTO: 9.55 K/UL — SIGNIFICANT CHANGE UP (ref 3.8–10.5)

## 2023-05-22 PROCEDURE — 99232 SBSQ HOSP IP/OBS MODERATE 35: CPT

## 2023-05-22 PROCEDURE — 93306 TTE W/DOPPLER COMPLETE: CPT | Mod: 26

## 2023-05-22 PROCEDURE — 99232 SBSQ HOSP IP/OBS MODERATE 35: CPT | Mod: 25

## 2023-05-22 PROCEDURE — 94010 BREATHING CAPACITY TEST: CPT | Mod: 26

## 2023-05-22 RX ORDER — POTASSIUM CHLORIDE 20 MEQ
20 PACKET (EA) ORAL ONCE
Refills: 0 | Status: COMPLETED | OUTPATIENT
Start: 2023-05-22 | End: 2023-05-22

## 2023-05-22 RX ADMIN — SODIUM CHLORIDE 3 MILLILITER(S): 9 INJECTION INTRAMUSCULAR; INTRAVENOUS; SUBCUTANEOUS at 06:44

## 2023-05-22 RX ADMIN — Medication 2: at 12:04

## 2023-05-22 RX ADMIN — SENNA PLUS 2 TABLET(S): 8.6 TABLET ORAL at 17:45

## 2023-05-22 RX ADMIN — PANTOPRAZOLE SODIUM 40 MILLIGRAM(S): 20 TABLET, DELAYED RELEASE ORAL at 07:58

## 2023-05-22 RX ADMIN — ATORVASTATIN CALCIUM 40 MILLIGRAM(S): 80 TABLET, FILM COATED ORAL at 22:32

## 2023-05-22 RX ADMIN — SODIUM CHLORIDE 3 MILLILITER(S): 9 INJECTION INTRAMUSCULAR; INTRAVENOUS; SUBCUTANEOUS at 14:12

## 2023-05-22 RX ADMIN — INSULIN GLARGINE 30 UNIT(S): 100 INJECTION, SOLUTION SUBCUTANEOUS at 22:32

## 2023-05-22 RX ADMIN — SODIUM CHLORIDE 3 MILLILITER(S): 9 INJECTION INTRAMUSCULAR; INTRAVENOUS; SUBCUTANEOUS at 21:24

## 2023-05-22 RX ADMIN — Medication 10 UNIT(S): at 12:04

## 2023-05-22 RX ADMIN — Medication 20 MILLIEQUIVALENT(S): at 07:58

## 2023-05-22 RX ADMIN — ISOSORBIDE MONONITRATE 30 MILLIGRAM(S): 60 TABLET, EXTENDED RELEASE ORAL at 11:17

## 2023-05-22 RX ADMIN — HEPARIN SODIUM 6.8 UNIT(S)/HR: 5000 INJECTION INTRAVENOUS; SUBCUTANEOUS at 06:54

## 2023-05-22 RX ADMIN — HEPARIN SODIUM 6.5 UNIT(S)/HR: 5000 INJECTION INTRAVENOUS; SUBCUTANEOUS at 08:00

## 2023-05-22 RX ADMIN — Medication 10 UNIT(S): at 16:51

## 2023-05-22 RX ADMIN — Medication 2: at 07:54

## 2023-05-22 RX ADMIN — Medication 10 UNIT(S): at 07:49

## 2023-05-22 RX ADMIN — Medication 81 MILLIGRAM(S): at 11:17

## 2023-05-22 NOTE — PROGRESS NOTE ADULT - SUBJECTIVE AND OBJECTIVE BOX
Patient discussed on morning rounds with Dr. Calloway    Operation / Date: Management of CAD    SUBJECTIVE ASSESSMENT:  72y Male seen and examined at bedside.  Patient denies any complaints.  Patient denies chest pain, shortness of breath, nausea, vomiting.          Vital Signs Last 24 Hrs  T(C): 36.1 (22 May 2023 10:19), Max: 37 (21 May 2023 22:21)  T(F): 97 (22 May 2023 10:19), Max: 98.6 (21 May 2023 22:21)  HR: 66 (22 May 2023 08:40) (50 - 70)  BP: 124/59 (22 May 2023 08:40) (92/61 - 124/59)  BP(mean): 85 (22 May 2023 08:40) (69 - 85)  RR: 16 (22 May 2023 08:40) (16 - 18)  SpO2: 96% (22 May 2023 08:40) (93% - 98%)    Parameters below as of 22 May 2023 08:40  Patient On (Oxygen Delivery Method): room air      I&O's Detail    21 May 2023 07:01  -  22 May 2023 07:00  --------------------------------------------------------  IN:    Heparin: 6.8 mL  Total IN: 6.8 mL    OUT:    Voided (mL): 900 mL  Total OUT: 900 mL    Total NET: -893.2 mL      22 May 2023 07:01  -  22 May 2023 10:52  --------------------------------------------------------  IN:    Heparin: 19.5 mL  Total IN: 19.5 mL    OUT:  Total OUT: 0 mL    Total NET: 19.5 mL          CHEST TUBE:  No.   LYNETTE DRAIN:  No.  EPICARDIAL WIRES: No.  TIE DOWNS: No.  AVILEZ: No.    PHYSICAL EXAM:    General: Laying in bed NAD  HEENT: NCAT MMM EOMI neck supple   Neurological: forgetful this morning about recent events, otherwise alert and oriented, RÍOS   Cardiovascular: RRR, normal s1 s2, no M/R/G  Respiratory: CTA b/l, no W/R/R  Gastrointestinal:  soft, non-tender to palpation, non-distended  Extremities: WWP, no pitting edema, no calf tenderness  Vascular: 2+radial pulses b/l  Skin: No rash     LABS:                        15.1   9.55  )-----------( 218      ( 22 May 2023 07:03 )             46.4       COUMADIN:  No. REASON: .    PT/INR - ( 22 May 2023 07:03 )   PT: 10.5 sec;   INR: 0.88          PTT - ( 22 May 2023 07:03 )  PTT:95.9 sec    05-    140  |  107  |  28<H>  ----------------------------<  176<H>  3.8   |  24  |  1.07    Ca    8.4      22 May 2023 07:03  Mg     2.2     -    TPro  6.5  /  Alb  3.8  /  TBili  0.5  /  DBili  x   /  AST  23  /  ALT  19  /  AlkPhos  89  05-21      Urinalysis Basic - ( 20 May 2023 14:21 )    Color: Yellow / Appearance: Clear / S.020 / pH: x  Gluc: x / Ketone: 40 mg/dL  / Bili: Negative / Urobili: 1.0 E.U./dL   Blood: x / Protein: NEGATIVE mg/dL / Nitrite: NEGATIVE   Leuk Esterase: NEGATIVE / RBC: x / WBC x   Sq Epi: x / Non Sq Epi: x / Bacteria: x        MEDICATIONS  (STANDING):  aspirin enteric coated 81 milliGRAM(s) Oral daily  atorvastatin 40 milliGRAM(s) Oral at bedtime  dextrose 5%. 1000 milliLiter(s) (100 mL/Hr) IV Continuous <Continuous>  dextrose 5%. 1000 milliLiter(s) (50 mL/Hr) IV Continuous <Continuous>  dextrose 50% Injectable 25 Gram(s) IV Push once  dextrose 50% Injectable 12.5 Gram(s) IV Push once  dextrose 50% Injectable 25 Gram(s) IV Push once  glucagon  Injectable 1 milliGRAM(s) IntraMuscular once  heparin  Infusion 680 Unit(s)/Hr (6.5 mL/Hr) IV Continuous <Continuous>  insulin glargine Injectable (LANTUS) 30 Unit(s) SubCutaneous at bedtime  insulin lispro (ADMELOG) corrective regimen sliding scale   SubCutaneous Before meals and at bedtime  insulin lispro Injectable (ADMELOG) 10 Unit(s) SubCutaneous three times a day before meals  isosorbide   mononitrate ER Tablet (IMDUR) 30 milliGRAM(s) Oral daily  pantoprazole    Tablet 40 milliGRAM(s) Oral before breakfast  sodium chloride 0.9% lock flush 3 milliLiter(s) IV Push every 8 hours    MEDICATIONS  (PRN):  dextrose Oral Gel 15 Gram(s) Oral once PRN Blood Glucose LESS THAN 70 milliGRAM(s)/deciliter  nitroglycerin     SubLingual 0.4 milliGRAM(s) SubLingual every 5 minutes PRN Chest Pain  senna 2 Tablet(s) Oral at bedtime PRN Constipation        RADIOLOGY & ADDITIONAL TESTS:  e< from: TTE Echo Complete w/o Doppler (23 @ 09:22) >   1. Normal left ventricular size and systolic function.   2. Mild symmetric left ventricular hypertrophy.   3. Normal right ventricular size and systolic function.   4. Normal atria.   5. Aortic sclerosis without significant stenosis.   6. No evidence of pulmonary hypertension.   7. No pericardial effusion.   8. No prior echo is available for comparison.    < end of copied text >     Patient discussed on morning rounds with Dr. Calloway    Operation / Date: Management of CAD    SUBJECTIVE ASSESSMENT:  72y Male seen and examined at bedside.  Patient denies any complaints.  Patient denies chest pain, shortness of breath, nausea, vomiting.          Vital Signs Last 24 Hrs  T(C): 36.1 (22 May 2023 10:19), Max: 37 (21 May 2023 22:21)  T(F): 97 (22 May 2023 10:19), Max: 98.6 (21 May 2023 22:21)  HR: 66 (22 May 2023 08:40) (50 - 70)  BP: 124/59 (22 May 2023 08:40) (92/61 - 124/59)  BP(mean): 85 (22 May 2023 08:40) (69 - 85)  RR: 16 (22 May 2023 08:40) (16 - 18)  SpO2: 96% (22 May 2023 08:40) (93% - 98%)    Parameters below as of 22 May 2023 08:40  Patient On (Oxygen Delivery Method): room air      I&O's Detail    21 May 2023 07:01  -  22 May 2023 07:00  --------------------------------------------------------  IN:    Heparin: 6.8 mL  Total IN: 6.8 mL    OUT:    Voided (mL): 900 mL  Total OUT: 900 mL    Total NET: -893.2 mL      22 May 2023 07:01  -  22 May 2023 10:52  --------------------------------------------------------  IN:    Heparin: 19.5 mL  Total IN: 19.5 mL    OUT:  Total OUT: 0 mL    Total NET: 19.5 mL          CHEST TUBE:  No.   LYNETTE DRAIN:  No.  EPICARDIAL WIRES: No.  TIE DOWNS: No.  AVILEZ: No.    PHYSICAL EXAM:    General: Laying in bed NAD  HEENT: NCAT MMM EOMI neck supple   Neurological: otherwise alert and oriented, RÍOS   Cardiovascular: RRR, normal s1 s2, no M/R/G  Respiratory: CTA b/l, no W/R/R  Gastrointestinal:  soft, non-tender to palpation, non-distended  Extremities: WWP, no pitting edema, no calf tenderness  Vascular: 2+radial pulses b/l  Skin: No rash     LABS:                        15.1   9.55  )-----------( 218      ( 22 May 2023 07:03 )             46.4       COUMADIN:  No. REASON: .    PT/INR - ( 22 May 2023 07:03 )   PT: 10.5 sec;   INR: 0.88          PTT - ( 22 May 2023 07:03 )  PTT:95.9 sec    05-    140  |  107  |  28<H>  ----------------------------<  176<H>  3.8   |  24  |  1.07    Ca    8.4      22 May 2023 07:03  Mg     2.2     -    TPro  6.5  /  Alb  3.8  /  TBili  0.5  /  DBili  x   /  AST  23  /  ALT  19  /  AlkPhos  89  05-      Urinalysis Basic - ( 20 May 2023 14:21 )    Color: Yellow / Appearance: Clear / S.020 / pH: x  Gluc: x / Ketone: 40 mg/dL  / Bili: Negative / Urobili: 1.0 E.U./dL   Blood: x / Protein: NEGATIVE mg/dL / Nitrite: NEGATIVE   Leuk Esterase: NEGATIVE / RBC: x / WBC x   Sq Epi: x / Non Sq Epi: x / Bacteria: x        MEDICATIONS  (STANDING):  aspirin enteric coated 81 milliGRAM(s) Oral daily  atorvastatin 40 milliGRAM(s) Oral at bedtime  dextrose 5%. 1000 milliLiter(s) (100 mL/Hr) IV Continuous <Continuous>  dextrose 5%. 1000 milliLiter(s) (50 mL/Hr) IV Continuous <Continuous>  dextrose 50% Injectable 25 Gram(s) IV Push once  dextrose 50% Injectable 12.5 Gram(s) IV Push once  dextrose 50% Injectable 25 Gram(s) IV Push once  glucagon  Injectable 1 milliGRAM(s) IntraMuscular once  heparin  Infusion 680 Unit(s)/Hr (6.5 mL/Hr) IV Continuous <Continuous>  insulin glargine Injectable (LANTUS) 30 Unit(s) SubCutaneous at bedtime  insulin lispro (ADMELOG) corrective regimen sliding scale   SubCutaneous Before meals and at bedtime  insulin lispro Injectable (ADMELOG) 10 Unit(s) SubCutaneous three times a day before meals  isosorbide   mononitrate ER Tablet (IMDUR) 30 milliGRAM(s) Oral daily  pantoprazole    Tablet 40 milliGRAM(s) Oral before breakfast  sodium chloride 0.9% lock flush 3 milliLiter(s) IV Push every 8 hours    MEDICATIONS  (PRN):  dextrose Oral Gel 15 Gram(s) Oral once PRN Blood Glucose LESS THAN 70 milliGRAM(s)/deciliter  nitroglycerin     SubLingual 0.4 milliGRAM(s) SubLingual every 5 minutes PRN Chest Pain  senna 2 Tablet(s) Oral at bedtime PRN Constipation        RADIOLOGY & ADDITIONAL TESTS:  e< from: TTE Echo Complete w/o Doppler (23 @ 09:22) >   1. Normal left ventricular size and systolic function.   2. Mild symmetric left ventricular hypertrophy.   3. Normal right ventricular size and systolic function.   4. Normal atria.   5. Aortic sclerosis without significant stenosis.   6. No evidence of pulmonary hypertension.   7. No pericardial effusion.   8. No prior echo is available for comparison.    < end of copied text >

## 2023-05-22 NOTE — PROGRESS NOTE ADULT - ASSESSMENT
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 He was r/i NSTEMI and underwent cardiac cath revealing mv CAD. He was started on a heparin gtt and transferred to [facility] under Dr Martinez. Planned for CABG on Wed. Also with uncontrolled T2DM with hyperglycemia.     A1C: 10.2 %  BUN: 28  Creatinine: 1.07  GFR: 73  Weight: 69.4  BMI: 26.2  EF: 55-60%

## 2023-05-22 NOTE — PROGRESS NOTE ADULT - SUBJECTIVE AND OBJECTIVE BOX
SUBJECTIVE / INTERVAL HPI: Patient was seen and examined this morning sitting in chair. He does not know why he is here or that he is planned for surgery. He reports he did not have breakfast this morning and he "ate at home" last night.     CAPILLARY BLOOD GLUCOSE & INSULIN RECEIVED  287 mg/dL (05-21 @ 07:05)  237 mg/dL (05-21 @ 07:06) - Lispro 3  249 mg/dL (05-21 @ 11:51) - Lispro 10  210 mg/dL (05-21 @ 16:35)  - Lispro 10+4  261 mg/dL (05-21 @ 22:46)- Lantus 30 + lispro 0  176 mg/dL (05-22 @ 07:03)  157 mg/dL (05-22 @ 07:17) - Lispro 10+2  183 mg/dL (05-22 @ 11:38) - Lispro 10+2      REVIEW OF SYSTEMS  Constitutional:  Negative fever, chills or loss of appetite.  Eyes:  Negative blurry vision or double vision.  Cardiovascular:  Negative for chest pain or palpitations.  Respiratory:  Negative for cough, wheezing, or shortness of breath.    Gastrointestinal:  Negative for nausea, vomiting, diarrhea, constipation, or abdominal pain.  Genitourinary:  Negative frequency, urgency or dysuria.  Neurologic:  No headache, confusion, dizziness, lightheadedness.    PHYSICAL EXAM  Vital Signs Last 24 Hrs  T(C): 36.3 (22 May 2023 13:44), Max: 37 (21 May 2023 22:21)  T(F): 97.4 (22 May 2023 13:44), Max: 98.6 (21 May 2023 22:21)  HR: 54 (22 May 2023 12:52) (50 - 70)  BP: 115/56 (22 May 2023 12:52) (92/61 - 124/59)  BP(mean): 81 (22 May 2023 12:52) (69 - 85)  RR: 16 (22 May 2023 12:52) (16 - 18)  SpO2: 98% (22 May 2023 12:52) (93% - 98%)    Parameters below as of 22 May 2023 12:52  Patient On (Oxygen Delivery Method): room air      Constitutional: Awake, alert, in no acute distress.   HEENT: Normocephalic, atraumatic, MARSHALL.  Respiratory: Lungs clear to ausculation bilaterally.   Cardiovascular: regular rhythm, normal S1 and S2, no audible murmurs.   GI: soft, non-tender, non-distended, bowel sounds present.  Extremities: No lower extremity edema.  Psychiatric: AAO x 1. Normal affect/mood.     LABS  CBC - WBC/HGB/HTC/PLT: 9.55/15.1/46.4/218 (05-22-23)  BMP - Na/K/Cl/Bicarb/BUN/Cr/Gluc/AG/eGFR: 140/3.8/107/24/28/1.07/176/9/73 (05-22-23)  Ca - 8.4 (05-22-23)  Phos - -- (05-22-23)  Mg - 2.2 (05-22-23)  LFT - Alb/Tprot/Tbili/Dbili/AlkPhos/ALT/AST: 3.8/--/0.5/--/89/19/23 (05-21-23)  PT/aPTT/INR: 10.5/95.9/0.88 (05-22-23)   Thyroid Stimulating Hormone, Serum: 1.200 (05-20-23)      MEDICATIONS  MEDICATIONS  (STANDING):  aspirin enteric coated 81 milliGRAM(s) Oral daily  atorvastatin 40 milliGRAM(s) Oral at bedtime  dextrose 5%. 1000 milliLiter(s) (100 mL/Hr) IV Continuous <Continuous>  dextrose 5%. 1000 milliLiter(s) (50 mL/Hr) IV Continuous <Continuous>  dextrose 50% Injectable 25 Gram(s) IV Push once  dextrose 50% Injectable 12.5 Gram(s) IV Push once  dextrose 50% Injectable 25 Gram(s) IV Push once  glucagon  Injectable 1 milliGRAM(s) IntraMuscular once  heparin  Infusion 680 Unit(s)/Hr (6.5 mL/Hr) IV Continuous <Continuous>  insulin glargine Injectable (LANTUS) 30 Unit(s) SubCutaneous at bedtime  insulin lispro (ADMELOG) corrective regimen sliding scale   SubCutaneous Before meals and at bedtime  insulin lispro Injectable (ADMELOG) 10 Unit(s) SubCutaneous three times a day before meals  isosorbide   mononitrate ER Tablet (IMDUR) 30 milliGRAM(s) Oral daily  pantoprazole    Tablet 40 milliGRAM(s) Oral before breakfast  sodium chloride 0.9% lock flush 3 milliLiter(s) IV Push every 8 hours    MEDICATIONS  (PRN):  dextrose Oral Gel 15 Gram(s) Oral once PRN Blood Glucose LESS THAN 70 milliGRAM(s)/deciliter  nitroglycerin     SubLingual 0.4 milliGRAM(s) SubLingual every 5 minutes PRN Chest Pain  senna 2 Tablet(s) Oral at bedtime PRN Constipation       SUBJECTIVE / INTERVAL HPI: Patient was seen and examined this morning sitting in chair. He does not know why he is here or that he is planned for surgery. He reports he did not have breakfast this morning and he "ate at home" last night.  Family at bedside reports he takes levemir 50 at bedtime and novolog SS starting at 5 units. He often eats in the middle of the night and also has low glucose in the middle of the night.   He has been referred to endo, but deferred due to current illness.    CAPILLARY BLOOD GLUCOSE & INSULIN RECEIVED  287 mg/dL (05-21 @ 07:05)  237 mg/dL (05-21 @ 07:06) - Lispro 3  249 mg/dL (05-21 @ 11:51) - Lispro 10  210 mg/dL (05-21 @ 16:35)  - Lispro 10+4  261 mg/dL (05-21 @ 22:46)- Lantus 30 + lispro 0  176 mg/dL (05-22 @ 07:03)  157 mg/dL (05-22 @ 07:17) - Lispro 10+2  183 mg/dL (05-22 @ 11:38) - Lispro 10+2      REVIEW OF SYSTEMS  Constitutional:  Negative fever, chills or loss of appetite.  Eyes:  Negative blurry vision or double vision.  Cardiovascular:  Negative for chest pain or palpitations.  Respiratory:  Negative for cough, wheezing, or shortness of breath.    Gastrointestinal:  Negative for nausea, vomiting, diarrhea, constipation, or abdominal pain.  Genitourinary:  Negative frequency, urgency or dysuria.  Neurologic:  No headache, confusion, dizziness, lightheadedness.    PHYSICAL EXAM  Vital Signs Last 24 Hrs  T(C): 36.3 (22 May 2023 13:44), Max: 37 (21 May 2023 22:21)  T(F): 97.4 (22 May 2023 13:44), Max: 98.6 (21 May 2023 22:21)  HR: 54 (22 May 2023 12:52) (50 - 70)  BP: 115/56 (22 May 2023 12:52) (92/61 - 124/59)  BP(mean): 81 (22 May 2023 12:52) (69 - 85)  RR: 16 (22 May 2023 12:52) (16 - 18)  SpO2: 98% (22 May 2023 12:52) (93% - 98%)    Parameters below as of 22 May 2023 12:52  Patient On (Oxygen Delivery Method): room air      Constitutional: Awake, alert, in no acute distress.   HEENT: Normocephalic, atraumatic, MARSHALL.  Respiratory: Lungs clear to ausculation bilaterally.   Cardiovascular: regular rhythm, normal S1 and S2, no audible murmurs.   GI: soft, non-tender, non-distended, bowel sounds present.  Extremities: No lower extremity edema.  Psychiatric: AAO x 1. Normal affect/mood.     LABS  CBC - WBC/HGB/HTC/PLT: 9.55/15.1/46.4/218 (05-22-23)  BMP - Na/K/Cl/Bicarb/BUN/Cr/Gluc/AG/eGFR: 140/3.8/107/24/28/1.07/176/9/73 (05-22-23)  Ca - 8.4 (05-22-23)  Phos - -- (05-22-23)  Mg - 2.2 (05-22-23)  LFT - Alb/Tprot/Tbili/Dbili/AlkPhos/ALT/AST: 3.8/--/0.5/--/89/19/23 (05-21-23)  PT/aPTT/INR: 10.5/95.9/0.88 (05-22-23)   Thyroid Stimulating Hormone, Serum: 1.200 (05-20-23)      MEDICATIONS  MEDICATIONS  (STANDING):  aspirin enteric coated 81 milliGRAM(s) Oral daily  atorvastatin 40 milliGRAM(s) Oral at bedtime  dextrose 5%. 1000 milliLiter(s) (100 mL/Hr) IV Continuous <Continuous>  dextrose 5%. 1000 milliLiter(s) (50 mL/Hr) IV Continuous <Continuous>  dextrose 50% Injectable 25 Gram(s) IV Push once  dextrose 50% Injectable 12.5 Gram(s) IV Push once  dextrose 50% Injectable 25 Gram(s) IV Push once  glucagon  Injectable 1 milliGRAM(s) IntraMuscular once  heparin  Infusion 680 Unit(s)/Hr (6.5 mL/Hr) IV Continuous <Continuous>  insulin glargine Injectable (LANTUS) 30 Unit(s) SubCutaneous at bedtime  insulin lispro (ADMELOG) corrective regimen sliding scale   SubCutaneous Before meals and at bedtime  insulin lispro Injectable (ADMELOG) 10 Unit(s) SubCutaneous three times a day before meals  isosorbide   mononitrate ER Tablet (IMDUR) 30 milliGRAM(s) Oral daily  pantoprazole    Tablet 40 milliGRAM(s) Oral before breakfast  sodium chloride 0.9% lock flush 3 milliLiter(s) IV Push every 8 hours    MEDICATIONS  (PRN):  dextrose Oral Gel 15 Gram(s) Oral once PRN Blood Glucose LESS THAN 70 milliGRAM(s)/deciliter  nitroglycerin     SubLingual 0.4 milliGRAM(s) SubLingual every 5 minutes PRN Chest Pain  senna 2 Tablet(s) Oral at bedtime PRN Constipation

## 2023-05-22 NOTE — PROGRESS NOTE ADULT - ASSESSMENT
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 He was r/i NSTEMI and underwent cardiac cath revealing mv CAD. He was started on a heparin gtt and transferred to [facility] under Dr Mratinez for pre-op CABG workup.    Neuro:   - Alzheimer's disease, alert and oriented but forgetful  - Previous CVA no residual deficit, CT head with chronic right occipital, right frontal and left cerebellar infarcts  - CTA brain 2016 showed diminutive R ICA without stenosis.,   - Carotid dopplers     Respiratory  - Oxygenating well on ra 95%  - CXR showing clear lung fields  - PFTS  - Encourage ambulation C+DB and use of IS 10x / hr while awake    Cardiac  - mv CAD +NSTEMI, conitnue asp,statin, heparin gtt. no BB started secondary to baseline sinus bradycardia   - HTN: Continue  imdur titrate as needed  - HLD: statin   - monitor r/bp/tele    GI:   - DASH/CC diet   - Continue bowel regimen  - GI PPX with protonix     Renal/:   - Monitor renal function BUN/Cr 28/1.07  - Monitor I/Os  - Replete K<4.0, Mg<2.0    Heme:  - H&H stable 15/46, continue to monitor   - DVT ppx with hep gtt     Endocrinology  - A1C 10, Endo consult. Placed on lantus 20, lispro 5 TID until further reccs   - TSH WNL      ID: afebrile WBC 9.5  - UA negative  - Observe for SIRS/Sepsis Syndrome    Dispo: OR on Wednesday

## 2023-05-22 NOTE — PROGRESS NOTE ADULT - PROBLEM SELECTOR PLAN 1
Type 2 diabetes mellitus  - Please continue lantus *** units at bedtime.   - Continue lispro *** units before each meal.  - Continue lispro moderate / low dose sliding scale before meals and at bedtime.  - Patient's fingerstick glucose goal is 100-180 mg/dL.    - For discharge, patient can ***.    - Patient can follow up at discharge with [facility] by calling [phone] to make an appointment.      Case discussed with Dr. Kennedy. Primary team updated. Type 2 diabetes mellitus  - Please decrease lantus to 25 units at bedtime.   - Continue lispro 10 units before each meal.  - Continue lispro moderate dose sliding scale before meals and at bedtime.  - Patient's fingerstick glucose goal is 100-180 mg/dL.    - For discharge, patient can ***.    - Patient can follow up at discharge with [facility] by calling [phone] to make an appointment.      Case discussed with Dr. Kennedy. Primary team updated.

## 2023-05-23 ENCOUNTER — TRANSCRIPTION ENCOUNTER (OUTPATIENT)
Age: 73
End: 2023-05-23

## 2023-05-23 PROBLEM — Z00.00 ENCOUNTER FOR PREVENTIVE HEALTH EXAMINATION: Noted: 2023-05-23

## 2023-05-23 LAB
ANION GAP SERPL CALC-SCNC: 9 MMOL/L — SIGNIFICANT CHANGE UP (ref 5–17)
APTT BLD: 113.9 SEC — HIGH (ref 27.5–35.5)
APTT BLD: 50.2 SEC — HIGH (ref 27.5–35.5)
APTT BLD: 61.9 SEC — HIGH (ref 27.5–35.5)
BUN SERPL-MCNC: 21 MG/DL — SIGNIFICANT CHANGE UP (ref 7–23)
CALCIUM SERPL-MCNC: 9 MG/DL — SIGNIFICANT CHANGE UP (ref 8.4–10.5)
CHLORIDE SERPL-SCNC: 108 MMOL/L — SIGNIFICANT CHANGE UP (ref 96–108)
CO2 SERPL-SCNC: 23 MMOL/L — SIGNIFICANT CHANGE UP (ref 22–31)
CREAT SERPL-MCNC: 1.02 MG/DL — SIGNIFICANT CHANGE UP (ref 0.5–1.3)
EGFR: 78 ML/MIN/1.73M2 — SIGNIFICANT CHANGE UP
GLUCOSE BLDC GLUCOMTR-MCNC: 101 MG/DL — HIGH (ref 70–99)
GLUCOSE BLDC GLUCOMTR-MCNC: 102 MG/DL — HIGH (ref 70–99)
GLUCOSE BLDC GLUCOMTR-MCNC: 110 MG/DL — HIGH (ref 70–99)
GLUCOSE BLDC GLUCOMTR-MCNC: 128 MG/DL — HIGH (ref 70–99)
GLUCOSE SERPL-MCNC: 122 MG/DL — HIGH (ref 70–99)
HCT VFR BLD CALC: 48.5 % — SIGNIFICANT CHANGE UP (ref 39–50)
HGB BLD-MCNC: 15.5 G/DL — SIGNIFICANT CHANGE UP (ref 13–17)
MAGNESIUM SERPL-MCNC: 2.2 MG/DL — SIGNIFICANT CHANGE UP (ref 1.6–2.6)
MCHC RBC-ENTMCNC: 29 PG — SIGNIFICANT CHANGE UP (ref 27–34)
MCHC RBC-ENTMCNC: 32 GM/DL — SIGNIFICANT CHANGE UP (ref 32–36)
MCV RBC AUTO: 90.8 FL — SIGNIFICANT CHANGE UP (ref 80–100)
NRBC # BLD: 0 /100 WBCS — SIGNIFICANT CHANGE UP (ref 0–0)
PHOSPHATE SERPL-MCNC: 2.5 MG/DL — SIGNIFICANT CHANGE UP (ref 2.5–4.5)
PLATELET # BLD AUTO: 197 K/UL — SIGNIFICANT CHANGE UP (ref 150–400)
POTASSIUM SERPL-MCNC: 4.4 MMOL/L — SIGNIFICANT CHANGE UP (ref 3.5–5.3)
POTASSIUM SERPL-SCNC: 4.4 MMOL/L — SIGNIFICANT CHANGE UP (ref 3.5–5.3)
RBC # BLD: 5.34 M/UL — SIGNIFICANT CHANGE UP (ref 4.2–5.8)
RBC # FLD: 13.6 % — SIGNIFICANT CHANGE UP (ref 10.3–14.5)
SODIUM SERPL-SCNC: 140 MMOL/L — SIGNIFICANT CHANGE UP (ref 135–145)
WBC # BLD: 9.83 K/UL — SIGNIFICANT CHANGE UP (ref 3.8–10.5)
WBC # FLD AUTO: 9.83 K/UL — SIGNIFICANT CHANGE UP (ref 3.8–10.5)

## 2023-05-23 PROCEDURE — 99232 SBSQ HOSP IP/OBS MODERATE 35: CPT

## 2023-05-23 RX ORDER — CHLORHEXIDINE GLUCONATE 213 G/1000ML
1 SOLUTION TOPICAL ONCE
Refills: 0 | Status: COMPLETED | OUTPATIENT
Start: 2023-05-23 | End: 2023-05-23

## 2023-05-23 RX ORDER — CHLORHEXIDINE GLUCONATE 213 G/1000ML
15 SOLUTION TOPICAL ONCE
Refills: 0 | Status: COMPLETED | OUTPATIENT
Start: 2023-05-24 | End: 2023-05-24

## 2023-05-23 RX ORDER — CHLORHEXIDINE GLUCONATE 213 G/1000ML
1 SOLUTION TOPICAL ONCE
Refills: 0 | Status: COMPLETED | OUTPATIENT
Start: 2023-05-24 | End: 2023-05-24

## 2023-05-23 RX ADMIN — Medication 10 UNIT(S): at 11:41

## 2023-05-23 RX ADMIN — Medication 10 UNIT(S): at 16:47

## 2023-05-23 RX ADMIN — SODIUM CHLORIDE 3 MILLILITER(S): 9 INJECTION INTRAMUSCULAR; INTRAVENOUS; SUBCUTANEOUS at 07:21

## 2023-05-23 RX ADMIN — CHLORHEXIDINE GLUCONATE 1 APPLICATION(S): 213 SOLUTION TOPICAL at 23:41

## 2023-05-23 RX ADMIN — SODIUM CHLORIDE 3 MILLILITER(S): 9 INJECTION INTRAMUSCULAR; INTRAVENOUS; SUBCUTANEOUS at 20:42

## 2023-05-23 RX ADMIN — PANTOPRAZOLE SODIUM 40 MILLIGRAM(S): 20 TABLET, DELAYED RELEASE ORAL at 07:44

## 2023-05-23 RX ADMIN — HEPARIN SODIUM 5.5 UNIT(S)/HR: 5000 INJECTION INTRAVENOUS; SUBCUTANEOUS at 22:07

## 2023-05-23 RX ADMIN — Medication 81 MILLIGRAM(S): at 11:03

## 2023-05-23 RX ADMIN — CHLORHEXIDINE GLUCONATE 1 APPLICATION(S): 213 SOLUTION TOPICAL at 22:48

## 2023-05-23 RX ADMIN — Medication 10 UNIT(S): at 07:42

## 2023-05-23 RX ADMIN — SODIUM CHLORIDE 3 MILLILITER(S): 9 INJECTION INTRAMUSCULAR; INTRAVENOUS; SUBCUTANEOUS at 14:21

## 2023-05-23 RX ADMIN — ATORVASTATIN CALCIUM 40 MILLIGRAM(S): 80 TABLET, FILM COATED ORAL at 22:08

## 2023-05-23 RX ADMIN — ISOSORBIDE MONONITRATE 30 MILLIGRAM(S): 60 TABLET, EXTENDED RELEASE ORAL at 10:46

## 2023-05-23 NOTE — PROGRESS NOTE ADULT - ASSESSMENT
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 He was r/i NSTEMI and underwent cardiac cath revealing mv CAD. He was started on a heparin gtt and transferred to [facility] under Dr Martinez for pre-op CABG workup. Pt scheduled for CABG tomorrow.     Plan:    Neurovascular:   -Pain well controlled with current regimen. PRN's: NA    Cardiovascular:   -Hemodynamically stable.   -Monitor: BP, HR, tele  -CAD    -CABG tomorrow    -c/w ASA    -c/w heparin drip at 5.5    -c/w imdur 30 QD  -HLD    -c/w lipitor      Respiratory:   -Oxygenating well on room air  -Encourage continued use of IS 10x/hr and frequent ambulation  -CXR: WNL    GI:  -GI PPX: protonix  -PO Diet  -Bowel Regimen: senna     Renal / :  -Continue to monitor renal function: BUN/Cr 21/1.02  -Monitor I/O's daily     Endocrine:    -No hx of DM or thyroid dx  -A1c: 10.2  -TSH: 1.2    Hematologic:  -CBC: H/H- 15.5/48.5  -Coagulation Panel.    ID:  -Temperature:  36.7  -CBC: WBC- 9.8  -Continue to observe for SIRS/Sepsis Syndrome.    Prophylaxis:  -DVT prophylaxis with heparin drip  -Continue with SCD's b/l while patient is at rest     Disposition:  -OR tomorrow

## 2023-05-23 NOTE — PROGRESS NOTE ADULT - SUBJECTIVE AND OBJECTIVE BOX
Planned Date of Surgery: 5/24                                                                                                           Surgeon/Attending MD: Brodie    Procedure: opcab    Subjective: Pt feeling well, no acute complaints at this time    HPI:  72y Male    PAST MEDICAL & SURGICAL HISTORY:  CVA (cerebrovascular accident)      HTN (hypertension)      HLD (hyperlipidemia)      Diabetes      AD (Alzheimer's disease)          Allergy Status Unknown      Vitals:  T(C): 36.8 (05-23-23 @ 13:27), Max: 36.8 (05-23-23 @ 13:27)  HR: 66 (05-23-23 @ 12:43) (52 - 66)  BP: 107/58 (05-23-23 @ 12:43) (107/58 - 164/70)  RR: 17 (05-23-23 @ 12:43) (16 - 18)  SpO2: 98% (05-23-23 @ 12:43) (95% - 99%)    PHYSICAL EXAM:  General: resting comfortably in bed in NAD  Neurological: AOx3. Motor skills grossly intact  Cardiovascular: Normal S1/S2. Regular rate/rhythm. No murmurs  Respiratory: Lungs CTA bilaterally. No wheezing or rales  Gastrointestinal: +BS in all 4 quadrants. Non-distended. Soft. Non-tender  Extremities: Strength 5/5 b/l upper/lower extremities. Sensation grossly intact upper/lower extremities. No edema. No calf tenderness.  Vascular: Radial 2+bilaterally, DP 2+ b/l  Incision Sites: NA      MEDICATIONS  (STANDING):  aspirin enteric coated 81 milliGRAM(s) Oral daily  atorvastatin 40 milliGRAM(s) Oral at bedtime  chlorhexidine 4% Liquid 1 Application(s) Topical once  chlorhexidine 4% Liquid 1 Application(s) Topical once  dextrose 5%. 1000 milliLiter(s) (50 mL/Hr) IV Continuous <Continuous>  dextrose 5%. 1000 milliLiter(s) (100 mL/Hr) IV Continuous <Continuous>  dextrose 50% Injectable 25 Gram(s) IV Push once  dextrose 50% Injectable 12.5 Gram(s) IV Push once  dextrose 50% Injectable 25 Gram(s) IV Push once  glucagon  Injectable 1 milliGRAM(s) IntraMuscular once  heparin  Infusion 680 Unit(s)/Hr (5.5 mL/Hr) IV Continuous <Continuous>  insulin glargine Injectable (LANTUS) 30 Unit(s) SubCutaneous at bedtime  insulin lispro (ADMELOG) corrective regimen sliding scale   SubCutaneous Before meals and at bedtime  insulin lispro Injectable (ADMELOG) 10 Unit(s) SubCutaneous three times a day before meals  isosorbide   mononitrate ER Tablet (IMDUR) 30 milliGRAM(s) Oral daily  pantoprazole    Tablet 40 milliGRAM(s) Oral before breakfast  sodium chloride 0.9% lock flush 3 milliLiter(s) IV Push every 8 hours    MEDICATIONS  (PRN):  dextrose Oral Gel 15 Gram(s) Oral once PRN Blood Glucose LESS THAN 70 milliGRAM(s)/deciliter  nitroglycerin     SubLingual 0.4 milliGRAM(s) SubLingual every 5 minutes PRN Chest Pain  senna 2 Tablet(s) Oral at bedtime PRN Constipation      On Beta Blocker? YES / NO / CONTRAINDICATED Reason_______________    Labs:                        15.5   9.83  )-----------( 197      ( 23 May 2023 07:13 )             48.5     05-23    140  |  108  |  21  ----------------------------<  122<H>  4.4   |  23  |  1.02    Ca    9.0      23 May 2023 07:13  Phos  2.5     05-23  Mg     2.2     05-23      PT/INR - ( 22 May 2023 07:03 )   PT: 10.5 sec;   INR: 0.88          PTT - ( 23 May 2023 08:04 )  PTT:113.9 sec    ABO Interpretation: O (05-20-23 @ 17:46)            Preoperative Checklist: (x = completed, n/a = not applicable, p = pending)  [ x ] ECHO  [  ] CXR  [ x ] Carotid Duplex  [ na ] CT imaging  [ x ] PFTs  [ x ] UA  [ x ] Baseline Labs (CMP, CBC w/ diff, PTT, PT/INR)  [ x ] A1c  [ x ] TSH  [ x ] Lipid panel  [ na ] Cardiac enzymes  [ x ] Pro BNP  [ x ] EKG   [ x ] T&S 1  [ pending ] T&S 2 (within 72 hours)  [ na ] COVID PCR (within 72 hours)  [ na ] Room air ABG  [ na ] P2Y12  [ na ] bHCG  [ x ] Consents  [ x ] Pre-op Orders Placed  [ x ] Blood Products Ordered  [ x ] NPO at midnight  [ pending ] Conduit tested    Abnormal/Noteworthy Preoperative Testing Results:     < from: TTE Echo Complete w/o Doppler (05.22.23 @ 09:22) >  CONCLUSIONS:     1. Normal left ventricular size and systolic function.   2. Mild symmetric left ventricular hypertrophy.   3. Normal right ventricular size and systolic function.   4. Normal atria.   5. Aortic sclerosis without significant stenosis.   6. No evidence of pulmonary hypertension.   7. No pericardial effusion.   8. No prior echo is available for comparison.    < end of copied text >     Home

## 2023-05-23 NOTE — PROGRESS NOTE ADULT - PROBLEM SELECTOR PLAN 1
Type 2 diabetes mellitus  - Please decrease lantus to 22 units at bedtime if NPO at MN. If not, give lantus 25 units.  - Continue lispro 10 units before each meal.  - Continue lispro moderate dose sliding scale before meals and at bedtime.  - Patient's fingerstick glucose goal is 100-180 mg/dL.    - For discharge, patient can ***.    - Patient can follow up at discharge with [facility] by calling [phone] to make an appointment.      Case discussed with Dr. Kennedy. Primary team updated.

## 2023-05-23 NOTE — PROGRESS NOTE ADULT - SUBJECTIVE AND OBJECTIVE BOX
SUBJECTIVE / INTERVAL HPI: Patient was seen and examined this morning.     CAPILLARY BLOOD GLUCOSE & INSULIN RECEIVED  176 mg/dL (05-22 @ 07:03)  157 mg/dL (05-22 @ 07:17)  183 mg/dL (05-22 @ 11:38)  144 mg/dL (05-22 @ 16:43)  149 mg/dL (05-22 @ 21:07)  122 mg/dL (05-23 @ 07:13)  101 mg/dL (05-23 @ 07:15)  110 mg/dL (05-23 @ 11:15)      REVIEW OF SYSTEMS  Constitutional:  Negative fever, chills or loss of appetite.  Eyes:  Negative blurry vision or double vision.  Cardiovascular:  Negative for chest pain or palpitations.  Respiratory:  Negative for cough, wheezing, or shortness of breath.    Gastrointestinal:  Negative for nausea, vomiting, diarrhea, constipation, or abdominal pain.  Genitourinary:  Negative frequency, urgency or dysuria.  Neurologic:  No headache, confusion, dizziness, lightheadedness.    PHYSICAL EXAM  Vital Signs Last 24 Hrs  T(C): 36.8 (23 May 2023 13:27), Max: 36.8 (23 May 2023 13:27)  T(F): 98.3 (23 May 2023 13:27), Max: 98.3 (23 May 2023 13:27)  HR: 66 (23 May 2023 12:43) (52 - 66)  BP: 107/58 (23 May 2023 12:43) (107/58 - 164/70)  BP(mean): 80 (23 May 2023 12:43) (73 - 101)  RR: 17 (23 May 2023 12:43) (16 - 18)  SpO2: 98% (23 May 2023 12:43) (95% - 99%)    Parameters below as of 23 May 2023 12:43  Patient On (Oxygen Delivery Method): room air        Constitutional: Awake, alert, in no acute distress.   HEENT: Normocephalic, atraumatic, MARSHALL.  Respiratory: Lungs clear to ausculation bilaterally.   Cardiovascular: regular rhythm, normal S1 and S2, no audible murmurs.   GI: soft, non-tender, non-distended, bowel sounds present.  Extremities: No lower extremity edema.  Psychiatric: AAO x 3. Normal affect/mood.     LABS  CBC - WBC/HGB/HTC/PLT: 9.83/15.5/48.5/197 (05-23-23)  BMP - Na/K/Cl/Bicarb/BUN/Cr/Gluc/AG/eGFR: 140/4.4/108/23/21/1.02/122/9/78 (05-23-23)  Ca - 9.0 (05-23-23)  Phos - 2.5 (05-23-23)  Mg - 2.2 (05-23-23)  LFT - Alb/Tprot/Tbili/Dbili/AlkPhos/ALT/AST: 3.8/--/0.5/--/89/19/23 (05-21-23)  PT/aPTT/INR: --/113.9/-- (05-23-23)   Thyroid Stimulating Hormone, Serum: 1.200 (05-20-23)      MEDICATIONS  MEDICATIONS  (STANDING):  aspirin enteric coated 81 milliGRAM(s) Oral daily  atorvastatin 40 milliGRAM(s) Oral at bedtime  dextrose 5%. 1000 milliLiter(s) (50 mL/Hr) IV Continuous <Continuous>  dextrose 5%. 1000 milliLiter(s) (100 mL/Hr) IV Continuous <Continuous>  dextrose 50% Injectable 25 Gram(s) IV Push once  dextrose 50% Injectable 12.5 Gram(s) IV Push once  dextrose 50% Injectable 25 Gram(s) IV Push once  glucagon  Injectable 1 milliGRAM(s) IntraMuscular once  heparin  Infusion 680 Unit(s)/Hr (5.5 mL/Hr) IV Continuous <Continuous>  insulin glargine Injectable (LANTUS) 30 Unit(s) SubCutaneous at bedtime  insulin lispro (ADMELOG) corrective regimen sliding scale   SubCutaneous Before meals and at bedtime  insulin lispro Injectable (ADMELOG) 10 Unit(s) SubCutaneous three times a day before meals  isosorbide   mononitrate ER Tablet (IMDUR) 30 milliGRAM(s) Oral daily  pantoprazole    Tablet 40 milliGRAM(s) Oral before breakfast  sodium chloride 0.9% lock flush 3 milliLiter(s) IV Push every 8 hours    MEDICATIONS  (PRN):  dextrose Oral Gel 15 Gram(s) Oral once PRN Blood Glucose LESS THAN 70 milliGRAM(s)/deciliter  nitroglycerin     SubLingual 0.4 milliGRAM(s) SubLingual every 5 minutes PRN Chest Pain  senna 2 Tablet(s) Oral at bedtime PRN Constipation    ASSESSMENT / RECOMMENDATIONS    A1C: 10.2 %  BUN: 21  Creatinine: 1.02  GFR: 78  Weight: 69.4  BMI: 26.2  EF:     # Type 2 diabetes mellitus  - Please continue lantus *** units at bedtime.   - Continue lispro *** units before each meal.  - Continue lispro moderate / low dose sliding scale before meals and at bedtime.  - Patient's fingerstick glucose goal is 100-180 mg/dL.    - For discharge, patient can ***.    - Patient can follow up at discharge with [facility] by calling [phone] to make an appointment.      Case discussed with Dr. Gloria. Primary team updated.       Josue Ibarra    Endocrinology Fellow    Service Pager: [phone]    SUBJECTIVE / INTERVAL HPI: Patient was seen and examined this morning. He was calm. No complaints. Reports feeling strong. Reports eating breakfast, but can't remember what. During afternoon rounds he was aggressively against surgery, stating that he understands the risk and doesn't want to hurt anymore.    CAPILLARY BLOOD GLUCOSE & INSULIN RECEIVED  176 mg/dL (05-22 @ 07:03)  157 mg/dL (05-22 @ 07:17) - Lispro 10+2  183 mg/dL (05-22 @ 11:38) - Lispro 10  144 mg/dL (05-22 @ 16:43) - Lispro 10  149 mg/dL (05-22 @ 21:07) - Lantus 30  122 mg/dL (05-23 @ 07:13)  101 mg/dL (05-23 @ 07:15) - Lispro 10  110 mg/dL (05-23 @ 11:15)      REVIEW OF SYSTEMS  Constitutional:  Negative fever, chills or loss of appetite.  Eyes:  Negative blurry vision or double vision.  Cardiovascular:  Negative for chest pain or palpitations.  Respiratory:  Negative for cough, wheezing, or shortness of breath.    Gastrointestinal:  Negative for nausea, vomiting, diarrhea, constipation, or abdominal pain.  Genitourinary:  Negative frequency, urgency or dysuria.  Neurologic:  No headache, confusion, dizziness, lightheadedness.    PHYSICAL EXAM  Vital Signs Last 24 Hrs  T(C): 36.8 (23 May 2023 13:27), Max: 36.8 (23 May 2023 13:27)  T(F): 98.3 (23 May 2023 13:27), Max: 98.3 (23 May 2023 13:27)  HR: 66 (23 May 2023 12:43) (52 - 66)  BP: 107/58 (23 May 2023 12:43) (107/58 - 164/70)  BP(mean): 80 (23 May 2023 12:43) (73 - 101)  RR: 17 (23 May 2023 12:43) (16 - 18)  SpO2: 98% (23 May 2023 12:43) (95% - 99%)    Parameters below as of 23 May 2023 12:43  Patient On (Oxygen Delivery Method): room air        Constitutional: Awake, alert, in no acute distress.   HEENT: Normocephalic, atraumatic, MARSHALL.  Respiratory: Lungs clear to ausculation bilaterally.   Cardiovascular: regular rhythm, normal S1 and S2, no audible murmurs.   GI: soft, non-tender, non-distended, bowel sounds present.  Extremities: No lower extremity edema.  Psychiatric: AAO x 3. Normal affect/mood.     LABS  CBC - WBC/HGB/HTC/PLT: 9.83/15.5/48.5/197 (05-23-23)  BMP - Na/K/Cl/Bicarb/BUN/Cr/Gluc/AG/eGFR: 140/4.4/108/23/21/1.02/122/9/78 (05-23-23)  Ca - 9.0 (05-23-23)  Phos - 2.5 (05-23-23)  Mg - 2.2 (05-23-23)  LFT - Alb/Tprot/Tbili/Dbili/AlkPhos/ALT/AST: 3.8/--/0.5/--/89/19/23 (05-21-23)  PT/aPTT/INR: --/113.9/-- (05-23-23)   Thyroid Stimulating Hormone, Serum: 1.200 (05-20-23)      MEDICATIONS  MEDICATIONS  (STANDING):  aspirin enteric coated 81 milliGRAM(s) Oral daily  atorvastatin 40 milliGRAM(s) Oral at bedtime  dextrose 5%. 1000 milliLiter(s) (50 mL/Hr) IV Continuous <Continuous>  dextrose 5%. 1000 milliLiter(s) (100 mL/Hr) IV Continuous <Continuous>  dextrose 50% Injectable 25 Gram(s) IV Push once  dextrose 50% Injectable 12.5 Gram(s) IV Push once  dextrose 50% Injectable 25 Gram(s) IV Push once  glucagon  Injectable 1 milliGRAM(s) IntraMuscular once  heparin  Infusion 680 Unit(s)/Hr (5.5 mL/Hr) IV Continuous <Continuous>  insulin glargine Injectable (LANTUS) 30 Unit(s) SubCutaneous at bedtime  insulin lispro (ADMELOG) corrective regimen sliding scale   SubCutaneous Before meals and at bedtime  insulin lispro Injectable (ADMELOG) 10 Unit(s) SubCutaneous three times a day before meals  isosorbide   mononitrate ER Tablet (IMDUR) 30 milliGRAM(s) Oral daily  pantoprazole    Tablet 40 milliGRAM(s) Oral before breakfast  sodium chloride 0.9% lock flush 3 milliLiter(s) IV Push every 8 hours    MEDICATIONS  (PRN):  dextrose Oral Gel 15 Gram(s) Oral once PRN Blood Glucose LESS THAN 70 milliGRAM(s)/deciliter  nitroglycerin     SubLingual 0.4 milliGRAM(s) SubLingual every 5 minutes PRN Chest Pain  senna 2 Tablet(s) Oral at bedtime PRN Constipation

## 2023-05-23 NOTE — PROGRESS NOTE ADULT - ASSESSMENT
66yo M former smoker  with PMHx of HTN, HLD, DM, CVA (2106) no residual deficits, Alzheimers, who presented to  with CP at rest. He was r/i NSTEMI and underwent cardiac cath revealing mv CAD. He was started on a heparin gtt and transferred to [facility] under Dr Martinez. Planned for CABG on Wed. Also with uncontrolled T2DM with hyperglycemia.     A1C: 10.2 %  BUN: 28  Creatinine: 1.07  GFR: 73  Weight: 69.4  BMI: 26.2  EF: 55-60%

## 2023-05-24 ENCOUNTER — TRANSCRIPTION ENCOUNTER (OUTPATIENT)
Age: 73
End: 2023-05-24

## 2023-05-24 ENCOUNTER — APPOINTMENT (OUTPATIENT)
Dept: CARDIOTHORACIC SURGERY | Facility: HOSPITAL | Age: 73
End: 2023-05-24

## 2023-05-24 DIAGNOSIS — I25.10 ATHEROSCLEROTIC HEART DISEASE OF NATIVE CORONARY ARTERY WITHOUT ANGINA PECTORIS: ICD-10-CM

## 2023-05-24 DIAGNOSIS — Z86.73 PERSONAL HISTORY OF TRANSIENT ISCHEMIC ATTACK (TIA), AND CEREBRAL INFARCTION W/OUT RESIDUAL DEFICITS: ICD-10-CM

## 2023-05-24 DIAGNOSIS — F02.80 ALZHEIMER'S DISEASE, UNSPECIFIED: ICD-10-CM

## 2023-05-24 DIAGNOSIS — G30.9 ALZHEIMER'S DISEASE, UNSPECIFIED: ICD-10-CM

## 2023-05-24 DIAGNOSIS — Z86.79 PERSONAL HISTORY OF OTHER DISEASES OF THE CIRCULATORY SYSTEM: ICD-10-CM

## 2023-05-24 DIAGNOSIS — Z87.891 PERSONAL HISTORY OF NICOTINE DEPENDENCE: ICD-10-CM

## 2023-05-24 DIAGNOSIS — Z09 ENCOUNTER FOR FOLLOW-UP EXAMINATION AFTER COMPLETED TREATMENT FOR CONDITIONS OTHER THAN MALIGNANT NEOPLASM: ICD-10-CM

## 2023-05-24 DIAGNOSIS — Z86.39 PERSONAL HISTORY OF OTHER ENDOCRINE, NUTRITIONAL AND METABOLIC DISEASE: ICD-10-CM

## 2023-05-24 LAB
ALBUMIN SERPL ELPH-MCNC: 3.8 G/DL — SIGNIFICANT CHANGE UP (ref 3.3–5)
ALP SERPL-CCNC: 48 U/L — SIGNIFICANT CHANGE UP (ref 40–120)
ALT FLD-CCNC: 29 U/L — SIGNIFICANT CHANGE UP (ref 10–45)
ANION GAP SERPL CALC-SCNC: 12 MMOL/L — SIGNIFICANT CHANGE UP (ref 5–17)
ANION GAP SERPL CALC-SCNC: 8 MMOL/L — SIGNIFICANT CHANGE UP (ref 5–17)
APTT BLD: 36.2 SEC — HIGH (ref 27.5–35.5)
APTT BLD: 74.1 SEC — HIGH (ref 27.5–35.5)
AST SERPL-CCNC: 27 U/L — SIGNIFICANT CHANGE UP (ref 10–40)
BASE EXCESS BLDA CALC-SCNC: -0.1 MMOL/L — SIGNIFICANT CHANGE UP (ref -2–3)
BASE EXCESS BLDA CALC-SCNC: -2.8 MMOL/L — LOW (ref -2–3)
BASE EXCESS BLDA CALC-SCNC: -3 MMOL/L — LOW (ref -2–3)
BASE EXCESS BLDA CALC-SCNC: -4.1 MMOL/L — LOW (ref -2–3)
BASE EXCESS BLDA CALC-SCNC: 1.5 MMOL/L — SIGNIFICANT CHANGE UP (ref -2–3)
BASOPHILS # BLD AUTO: 0.05 K/UL — SIGNIFICANT CHANGE UP (ref 0–0.2)
BASOPHILS NFR BLD AUTO: 0.4 % — SIGNIFICANT CHANGE UP (ref 0–2)
BILIRUB SERPL-MCNC: 0.7 MG/DL — SIGNIFICANT CHANGE UP (ref 0.2–1.2)
BLD GP AB SCN SERPL QL: NEGATIVE — SIGNIFICANT CHANGE UP
BLD GP AB SCN SERPL QL: NEGATIVE — SIGNIFICANT CHANGE UP
BUN SERPL-MCNC: 14 MG/DL — SIGNIFICANT CHANGE UP (ref 7–23)
BUN SERPL-MCNC: 19 MG/DL — SIGNIFICANT CHANGE UP (ref 7–23)
CA-I BLDA-SCNC: 1.06 MMOL/L — LOW (ref 1.15–1.33)
CA-I BLDA-SCNC: 1.07 MMOL/L — LOW (ref 1.15–1.33)
CA-I BLDA-SCNC: 1.16 MMOL/L — SIGNIFICANT CHANGE UP (ref 1.15–1.33)
CA-I BLDA-SCNC: 1.17 MMOL/L — SIGNIFICANT CHANGE UP (ref 1.15–1.33)
CA-I BLDA-SCNC: 1.18 MMOL/L — SIGNIFICANT CHANGE UP (ref 1.15–1.33)
CALCIUM SERPL-MCNC: 8.5 MG/DL — SIGNIFICANT CHANGE UP (ref 8.4–10.5)
CALCIUM SERPL-MCNC: 8.7 MG/DL — SIGNIFICANT CHANGE UP (ref 8.4–10.5)
CHLORIDE SERPL-SCNC: 109 MMOL/L — HIGH (ref 96–108)
CHLORIDE SERPL-SCNC: 109 MMOL/L — HIGH (ref 96–108)
CO2 BLDA-SCNC: 22 MMOL/L — SIGNIFICANT CHANGE UP (ref 19–24)
CO2 BLDA-SCNC: 22 MMOL/L — SIGNIFICANT CHANGE UP (ref 19–24)
CO2 BLDA-SCNC: 23 MMOL/L — SIGNIFICANT CHANGE UP (ref 19–24)
CO2 BLDA-SCNC: 25 MMOL/L — HIGH (ref 19–24)
CO2 BLDA-SCNC: 27 MMOL/L — HIGH (ref 19–24)
CO2 SERPL-SCNC: 20 MMOL/L — LOW (ref 22–31)
CO2 SERPL-SCNC: 25 MMOL/L — SIGNIFICANT CHANGE UP (ref 22–31)
COHGB MFR BLDA: 0.6 % — SIGNIFICANT CHANGE UP
COHGB MFR BLDA: 0.6 % — SIGNIFICANT CHANGE UP
COHGB MFR BLDA: 0.7 % — SIGNIFICANT CHANGE UP
COHGB MFR BLDA: 0.8 % — SIGNIFICANT CHANGE UP
COHGB MFR BLDA: 0.9 % — SIGNIFICANT CHANGE UP
CREAT SERPL-MCNC: 0.93 MG/DL — SIGNIFICANT CHANGE UP (ref 0.5–1.3)
CREAT SERPL-MCNC: 1.12 MG/DL — SIGNIFICANT CHANGE UP (ref 0.5–1.3)
EGFR: 69 ML/MIN/1.73M2 — SIGNIFICANT CHANGE UP
EGFR: 87 ML/MIN/1.73M2 — SIGNIFICANT CHANGE UP
EOSINOPHIL # BLD AUTO: 0.54 K/UL — HIGH (ref 0–0.5)
EOSINOPHIL NFR BLD AUTO: 3.9 % — SIGNIFICANT CHANGE UP (ref 0–6)
GAS PNL BLDA: SIGNIFICANT CHANGE UP
GLUCOSE BLDA-MCNC: 136 MG/DL — HIGH (ref 70–99)
GLUCOSE BLDA-MCNC: 137 MG/DL — HIGH (ref 70–99)
GLUCOSE BLDA-MCNC: 148 MG/DL — HIGH (ref 70–99)
GLUCOSE BLDA-MCNC: 158 MG/DL — HIGH (ref 70–99)
GLUCOSE BLDA-MCNC: 176 MG/DL — HIGH (ref 70–99)
GLUCOSE BLDC GLUCOMTR-MCNC: 121 MG/DL — HIGH (ref 70–99)
GLUCOSE BLDC GLUCOMTR-MCNC: 127 MG/DL — HIGH (ref 70–99)
GLUCOSE BLDC GLUCOMTR-MCNC: 161 MG/DL — HIGH (ref 70–99)
GLUCOSE BLDC GLUCOMTR-MCNC: 186 MG/DL — HIGH (ref 70–99)
GLUCOSE BLDC GLUCOMTR-MCNC: 188 MG/DL — HIGH (ref 70–99)
GLUCOSE SERPL-MCNC: 145 MG/DL — HIGH (ref 70–99)
GLUCOSE SERPL-MCNC: 183 MG/DL — HIGH (ref 70–99)
HCO3 BLDA-SCNC: 21 MMOL/L — SIGNIFICANT CHANGE UP (ref 21–28)
HCO3 BLDA-SCNC: 21 MMOL/L — SIGNIFICANT CHANGE UP (ref 21–28)
HCO3 BLDA-SCNC: 22 MMOL/L — SIGNIFICANT CHANGE UP (ref 21–28)
HCO3 BLDA-SCNC: 24 MMOL/L — SIGNIFICANT CHANGE UP (ref 21–28)
HCO3 BLDA-SCNC: 26 MMOL/L — SIGNIFICANT CHANGE UP (ref 21–28)
HCT VFR BLD CALC: 33.4 % — LOW (ref 39–50)
HCT VFR BLD CALC: 45 % — SIGNIFICANT CHANGE UP (ref 39–50)
HGB BLD-MCNC: 10.7 G/DL — LOW (ref 13–17)
HGB BLD-MCNC: 14.7 G/DL — SIGNIFICANT CHANGE UP (ref 13–17)
HGB BLDA-MCNC: 11 G/DL — LOW (ref 12.6–17.4)
HGB BLDA-MCNC: 13 G/DL — SIGNIFICANT CHANGE UP (ref 12.6–17.4)
HGB BLDA-MCNC: 13 G/DL — SIGNIFICANT CHANGE UP (ref 12.6–17.4)
HGB BLDA-MCNC: 13.3 G/DL — SIGNIFICANT CHANGE UP (ref 12.6–17.4)
HGB BLDA-MCNC: 14 G/DL — SIGNIFICANT CHANGE UP (ref 12.6–17.4)
IMM GRANULOCYTES NFR BLD AUTO: 0.7 % — SIGNIFICANT CHANGE UP (ref 0–0.9)
INR BLD: 0.92 — SIGNIFICANT CHANGE UP (ref 0.88–1.16)
INR BLD: 1.22 — HIGH (ref 0.88–1.16)
LYMPHOCYTES # BLD AUTO: 18.9 % — SIGNIFICANT CHANGE UP (ref 13–44)
LYMPHOCYTES # BLD AUTO: 2.59 K/UL — SIGNIFICANT CHANGE UP (ref 1–3.3)
MAGNESIUM SERPL-MCNC: 1.7 MG/DL — SIGNIFICANT CHANGE UP (ref 1.6–2.6)
MAGNESIUM SERPL-MCNC: 2.1 MG/DL — SIGNIFICANT CHANGE UP (ref 1.6–2.6)
MCHC RBC-ENTMCNC: 28.8 PG — SIGNIFICANT CHANGE UP (ref 27–34)
MCHC RBC-ENTMCNC: 29.1 PG — SIGNIFICANT CHANGE UP (ref 27–34)
MCHC RBC-ENTMCNC: 32 GM/DL — SIGNIFICANT CHANGE UP (ref 32–36)
MCHC RBC-ENTMCNC: 32.7 GM/DL — SIGNIFICANT CHANGE UP (ref 32–36)
MCV RBC AUTO: 88.9 FL — SIGNIFICANT CHANGE UP (ref 80–100)
MCV RBC AUTO: 90 FL — SIGNIFICANT CHANGE UP (ref 80–100)
METHGB MFR BLDA: 0.4 % — SIGNIFICANT CHANGE UP
METHGB MFR BLDA: 0.4 % — SIGNIFICANT CHANGE UP
METHGB MFR BLDA: 0.5 % — SIGNIFICANT CHANGE UP
METHGB MFR BLDA: 0.8 % — SIGNIFICANT CHANGE UP
METHGB MFR BLDA: 1 % — SIGNIFICANT CHANGE UP
MONOCYTES # BLD AUTO: 0.53 K/UL — SIGNIFICANT CHANGE UP (ref 0–0.9)
MONOCYTES NFR BLD AUTO: 3.9 % — SIGNIFICANT CHANGE UP (ref 2–14)
NEUTROPHILS # BLD AUTO: 9.93 K/UL — HIGH (ref 1.8–7.4)
NEUTROPHILS NFR BLD AUTO: 72.2 % — SIGNIFICANT CHANGE UP (ref 43–77)
NRBC # BLD: 0 /100 WBCS — SIGNIFICANT CHANGE UP (ref 0–0)
NRBC # BLD: 0 /100 WBCS — SIGNIFICANT CHANGE UP (ref 0–0)
OXYHGB MFR BLDA: 97 % — HIGH (ref 90–95)
OXYHGB MFR BLDA: 97.5 % — HIGH (ref 90–95)
OXYHGB MFR BLDA: 97.8 % — HIGH (ref 90–95)
OXYHGB MFR BLDA: 97.9 % — HIGH (ref 90–95)
OXYHGB MFR BLDA: 98.1 % — HIGH (ref 90–95)
PCO2 BLDA: 33 MMHG — LOW (ref 35–48)
PCO2 BLDA: 36 MMHG — SIGNIFICANT CHANGE UP (ref 35–48)
PCO2 BLDA: 37 MMHG — SIGNIFICANT CHANGE UP (ref 35–48)
PCO2 BLDA: 37 MMHG — SIGNIFICANT CHANGE UP (ref 35–48)
PCO2 BLDA: 39 MMHG — SIGNIFICANT CHANGE UP (ref 35–48)
PH BLDA: 7.36 — SIGNIFICANT CHANGE UP (ref 7.35–7.45)
PH BLDA: 7.38 — SIGNIFICANT CHANGE UP (ref 7.35–7.45)
PH BLDA: 7.41 — SIGNIFICANT CHANGE UP (ref 7.35–7.45)
PH BLDA: 7.43 — SIGNIFICANT CHANGE UP (ref 7.35–7.45)
PH BLDA: 7.43 — SIGNIFICANT CHANGE UP (ref 7.35–7.45)
PHOSPHATE SERPL-MCNC: 2.6 MG/DL — SIGNIFICANT CHANGE UP (ref 2.5–4.5)
PLATELET # BLD AUTO: 139 K/UL — LOW (ref 150–400)
PLATELET # BLD AUTO: 219 K/UL — SIGNIFICANT CHANGE UP (ref 150–400)
PO2 BLDA: 345 MMHG — HIGH (ref 83–108)
PO2 BLDA: 353 MMHG — HIGH (ref 83–108)
PO2 BLDA: 361 MMHG — HIGH (ref 83–108)
PO2 BLDA: 366 MMHG — HIGH (ref 83–108)
PO2 BLDA: 410 MMHG — HIGH (ref 83–108)
POTASSIUM BLDA-SCNC: 3.3 MMOL/L — LOW (ref 3.5–5.1)
POTASSIUM BLDA-SCNC: 3.5 MMOL/L — SIGNIFICANT CHANGE UP (ref 3.5–5.1)
POTASSIUM BLDA-SCNC: 3.6 MMOL/L — SIGNIFICANT CHANGE UP (ref 3.5–5.1)
POTASSIUM BLDA-SCNC: 3.8 MMOL/L — SIGNIFICANT CHANGE UP (ref 3.5–5.1)
POTASSIUM BLDA-SCNC: 4 MMOL/L — SIGNIFICANT CHANGE UP (ref 3.5–5.1)
POTASSIUM SERPL-MCNC: 4 MMOL/L — SIGNIFICANT CHANGE UP (ref 3.5–5.3)
POTASSIUM SERPL-MCNC: 4.4 MMOL/L — SIGNIFICANT CHANGE UP (ref 3.5–5.3)
POTASSIUM SERPL-SCNC: 4 MMOL/L — SIGNIFICANT CHANGE UP (ref 3.5–5.3)
POTASSIUM SERPL-SCNC: 4.4 MMOL/L — SIGNIFICANT CHANGE UP (ref 3.5–5.3)
PROT SERPL-MCNC: 5.3 G/DL — LOW (ref 6–8.3)
PROTHROM AB SERPL-ACNC: 10.9 SEC — SIGNIFICANT CHANGE UP (ref 10.5–13.4)
PROTHROM AB SERPL-ACNC: 14.6 SEC — HIGH (ref 10.5–13.4)
RBC # BLD: 3.71 M/UL — LOW (ref 4.2–5.8)
RBC # BLD: 5.06 M/UL — SIGNIFICANT CHANGE UP (ref 4.2–5.8)
RBC # FLD: 13.7 % — SIGNIFICANT CHANGE UP (ref 10.3–14.5)
RBC # FLD: 13.7 % — SIGNIFICANT CHANGE UP (ref 10.3–14.5)
RH IG SCN BLD-IMP: POSITIVE — SIGNIFICANT CHANGE UP
RH IG SCN BLD-IMP: POSITIVE — SIGNIFICANT CHANGE UP
SAO2 % BLDA: 98.6 % — HIGH (ref 94–98)
SAO2 % BLDA: 98.9 % — HIGH (ref 94–98)
SAO2 % BLDA: 99.1 % — HIGH (ref 94–98)
SAO2 % BLDA: 99.2 % — HIGH (ref 94–98)
SAO2 % BLDA: 99.3 % — HIGH (ref 94–98)
SODIUM BLDA-SCNC: 138 MMOL/L — SIGNIFICANT CHANGE UP (ref 136–145)
SODIUM BLDA-SCNC: 139 MMOL/L — SIGNIFICANT CHANGE UP (ref 136–145)
SODIUM BLDA-SCNC: 140 MMOL/L — SIGNIFICANT CHANGE UP (ref 136–145)
SODIUM SERPL-SCNC: 141 MMOL/L — SIGNIFICANT CHANGE UP (ref 135–145)
SODIUM SERPL-SCNC: 142 MMOL/L — SIGNIFICANT CHANGE UP (ref 135–145)
UFH PPP CHRO-ACNC: 0.29 IU/ML — LOW (ref 0.3–0.7)
WBC # BLD: 13.73 K/UL — HIGH (ref 3.8–10.5)
WBC # BLD: 8.41 K/UL — SIGNIFICANT CHANGE UP (ref 3.8–10.5)
WBC # FLD AUTO: 13.73 K/UL — HIGH (ref 3.8–10.5)
WBC # FLD AUTO: 8.41 K/UL — SIGNIFICANT CHANGE UP (ref 3.8–10.5)

## 2023-05-24 PROCEDURE — 33508 ENDOSCOPIC VEIN HARVEST: CPT | Mod: AS,59

## 2023-05-24 PROCEDURE — 93010 ELECTROCARDIOGRAM REPORT: CPT

## 2023-05-24 PROCEDURE — 33518 CABG ARTERY-VEIN TWO: CPT

## 2023-05-24 PROCEDURE — 33533 CABG ARTERIAL SINGLE: CPT | Mod: AS

## 2023-05-24 PROCEDURE — 99292 CRITICAL CARE ADDL 30 MIN: CPT

## 2023-05-24 PROCEDURE — 33533 CABG ARTERIAL SINGLE: CPT

## 2023-05-24 PROCEDURE — 71045 X-RAY EXAM CHEST 1 VIEW: CPT | Mod: 26

## 2023-05-24 PROCEDURE — 99291 CRITICAL CARE FIRST HOUR: CPT

## 2023-05-24 PROCEDURE — 33518 CABG ARTERY-VEIN TWO: CPT | Mod: AS

## 2023-05-24 DEVICE — KIT CVC 3LUM SPECTRUM 9FR: Type: IMPLANTABLE DEVICE | Status: FUNCTIONAL

## 2023-05-24 DEVICE — SHUNT FLO-THRU INTRALUMINAL1.5MM X 18MM: Type: IMPLANTABLE DEVICE | Status: FUNCTIONAL

## 2023-05-24 DEVICE — CHEST DRAIN THORACIC PVC 28FR RIGHT ANGLE: Type: IMPLANTABLE DEVICE | Status: FUNCTIONAL

## 2023-05-24 DEVICE — PACING WIRE STREAMLINE BIPOLAR MYOCARDIAL: Type: IMPLANTABLE DEVICE | Status: FUNCTIONAL

## 2023-05-24 DEVICE — SHUNT FLO-THRU INTRALUMINAL1.75MM X 18MM: Type: IMPLANTABLE DEVICE | Status: FUNCTIONAL

## 2023-05-24 DEVICE — HEARTSTRING III PROXIMAL SEAL SYSTEM: Type: IMPLANTABLE DEVICE | Status: FUNCTIONAL

## 2023-05-24 DEVICE — SHUNT FLO-THRU INTRALUMINAL 2MM X 18MM: Type: IMPLANTABLE DEVICE | Status: FUNCTIONAL

## 2023-05-24 DEVICE — CANNULA VESSEL 3MM BEVELED TIP RADIOPAQUE: Type: IMPLANTABLE DEVICE | Status: FUNCTIONAL

## 2023-05-24 RX ORDER — HEPARIN SODIUM 5000 [USP'U]/ML
11 INJECTION INTRAVENOUS; SUBCUTANEOUS
Qty: 25000 | Refills: 0 | Status: DISCONTINUED | OUTPATIENT
Start: 2023-05-24 | End: 2023-05-24

## 2023-05-24 RX ORDER — ALBUMIN HUMAN 25 %
750 VIAL (ML) INTRAVENOUS ONCE
Refills: 0 | Status: DISCONTINUED | OUTPATIENT
Start: 2023-05-24 | End: 2023-05-27

## 2023-05-24 RX ORDER — INSULIN HUMAN 100 [IU]/ML
1 INJECTION, SOLUTION SUBCUTANEOUS
Qty: 100 | Refills: 0 | Status: DISCONTINUED | OUTPATIENT
Start: 2023-05-24 | End: 2023-05-25

## 2023-05-24 RX ORDER — FUROSEMIDE 40 MG
20 TABLET ORAL ONCE
Refills: 0 | Status: COMPLETED | OUTPATIENT
Start: 2023-05-24 | End: 2023-05-24

## 2023-05-24 RX ORDER — CLOPIDOGREL BISULFATE 75 MG/1
75 TABLET, FILM COATED ORAL DAILY
Refills: 0 | Status: DISCONTINUED | OUTPATIENT
Start: 2023-05-24 | End: 2023-05-28

## 2023-05-24 RX ORDER — NOREPINEPHRINE BITARTRATE/D5W 8 MG/250ML
0.03 PLASTIC BAG, INJECTION (ML) INTRAVENOUS
Qty: 8 | Refills: 0 | Status: DISCONTINUED | OUTPATIENT
Start: 2023-05-24 | End: 2023-05-25

## 2023-05-24 RX ORDER — FENTANYL CITRATE 50 UG/ML
25 INJECTION INTRAVENOUS ONCE
Refills: 0 | Status: DISCONTINUED | OUTPATIENT
Start: 2023-05-24 | End: 2023-05-24

## 2023-05-24 RX ORDER — CHLORHEXIDINE GLUCONATE 213 G/1000ML
1 SOLUTION TOPICAL DAILY
Refills: 0 | Status: DISCONTINUED | OUTPATIENT
Start: 2023-05-24 | End: 2023-05-28

## 2023-05-24 RX ORDER — CEFAZOLIN SODIUM 1 G
2000 VIAL (EA) INJECTION EVERY 8 HOURS
Refills: 0 | Status: COMPLETED | OUTPATIENT
Start: 2023-05-24 | End: 2023-05-26

## 2023-05-24 RX ORDER — ACETAMINOPHEN 500 MG
1000 TABLET ORAL ONCE
Refills: 0 | Status: COMPLETED | OUTPATIENT
Start: 2023-05-24 | End: 2023-05-25

## 2023-05-24 RX ORDER — PANTOPRAZOLE SODIUM 20 MG/1
40 TABLET, DELAYED RELEASE ORAL DAILY
Refills: 0 | Status: DISCONTINUED | OUTPATIENT
Start: 2023-05-24 | End: 2023-06-02

## 2023-05-24 RX ORDER — ASPIRIN/CALCIUM CARB/MAGNESIUM 324 MG
81 TABLET ORAL DAILY
Refills: 0 | Status: DISCONTINUED | OUTPATIENT
Start: 2023-05-24 | End: 2023-06-02

## 2023-05-24 RX ORDER — CHLORHEXIDINE GLUCONATE 213 G/1000ML
15 SOLUTION TOPICAL EVERY 12 HOURS
Refills: 0 | Status: DISCONTINUED | OUTPATIENT
Start: 2023-05-24 | End: 2023-05-25

## 2023-05-24 RX ORDER — ATORVASTATIN CALCIUM 80 MG/1
40 TABLET, FILM COATED ORAL AT BEDTIME
Refills: 0 | Status: DISCONTINUED | OUTPATIENT
Start: 2023-05-24 | End: 2023-06-02

## 2023-05-24 RX ORDER — SODIUM CHLORIDE 9 MG/ML
1000 INJECTION, SOLUTION INTRAVENOUS ONCE
Refills: 0 | Status: COMPLETED | OUTPATIENT
Start: 2023-05-24 | End: 2023-05-24

## 2023-05-24 RX ORDER — SODIUM CHLORIDE 9 MG/ML
1000 INJECTION INTRAMUSCULAR; INTRAVENOUS; SUBCUTANEOUS
Refills: 0 | Status: DISCONTINUED | OUTPATIENT
Start: 2023-05-24 | End: 2023-06-02

## 2023-05-24 RX ORDER — DEXMEDETOMIDINE HYDROCHLORIDE IN 0.9% SODIUM CHLORIDE 4 UG/ML
0.3 INJECTION INTRAVENOUS
Qty: 400 | Refills: 0 | Status: DISCONTINUED | OUTPATIENT
Start: 2023-05-24 | End: 2023-05-25

## 2023-05-24 RX ORDER — DEXTROSE 50 % IN WATER 50 %
25 SYRINGE (ML) INTRAVENOUS
Refills: 0 | Status: DISCONTINUED | OUTPATIENT
Start: 2023-05-24 | End: 2023-06-02

## 2023-05-24 RX ORDER — POLYETHYLENE GLYCOL 3350 17 G/17G
17 POWDER, FOR SOLUTION ORAL DAILY
Refills: 0 | Status: DISCONTINUED | OUTPATIENT
Start: 2023-05-24 | End: 2023-06-02

## 2023-05-24 RX ORDER — PROPOFOL 10 MG/ML
25 INJECTION, EMULSION INTRAVENOUS
Qty: 1000 | Refills: 0 | Status: DISCONTINUED | OUTPATIENT
Start: 2023-05-24 | End: 2023-05-25

## 2023-05-24 RX ORDER — HEPARIN SODIUM 5000 [USP'U]/ML
5000 INJECTION INTRAVENOUS; SUBCUTANEOUS EVERY 8 HOURS
Refills: 0 | Status: DISCONTINUED | OUTPATIENT
Start: 2023-05-24 | End: 2023-05-28

## 2023-05-24 RX ORDER — DEXTROSE 50 % IN WATER 50 %
50 SYRINGE (ML) INTRAVENOUS
Refills: 0 | Status: DISCONTINUED | OUTPATIENT
Start: 2023-05-24 | End: 2023-06-02

## 2023-05-24 RX ADMIN — FENTANYL CITRATE 25 MICROGRAM(S): 50 INJECTION INTRAVENOUS at 20:30

## 2023-05-24 RX ADMIN — CHLORHEXIDINE GLUCONATE 1 APPLICATION(S): 213 SOLUTION TOPICAL at 05:30

## 2023-05-24 RX ADMIN — SODIUM CHLORIDE 10 MILLILITER(S): 9 INJECTION INTRAMUSCULAR; INTRAVENOUS; SUBCUTANEOUS at 20:15

## 2023-05-24 RX ADMIN — Medication 3.9 MICROGRAM(S)/KG/MIN: at 22:00

## 2023-05-24 RX ADMIN — INSULIN HUMAN 1 UNIT(S)/HR: 100 INJECTION, SOLUTION SUBCUTANEOUS at 22:00

## 2023-05-24 RX ADMIN — HEPARIN SODIUM 5000 UNIT(S): 5000 INJECTION INTRAVENOUS; SUBCUTANEOUS at 21:17

## 2023-05-24 RX ADMIN — FENTANYL CITRATE 25 MICROGRAM(S): 50 INJECTION INTRAVENOUS at 20:00

## 2023-05-24 RX ADMIN — Medication 100 MILLIGRAM(S): at 21:17

## 2023-05-24 RX ADMIN — SODIUM CHLORIDE 3 MILLILITER(S): 9 INJECTION INTRAMUSCULAR; INTRAVENOUS; SUBCUTANEOUS at 06:19

## 2023-05-24 RX ADMIN — CHLORHEXIDINE GLUCONATE 15 MILLILITER(S): 213 SOLUTION TOPICAL at 09:34

## 2023-05-24 RX ADMIN — SODIUM CHLORIDE 1000 MILLILITER(S): 9 INJECTION, SOLUTION INTRAVENOUS at 20:00

## 2023-05-24 RX ADMIN — PANTOPRAZOLE SODIUM 40 MILLIGRAM(S): 20 TABLET, DELAYED RELEASE ORAL at 06:12

## 2023-05-24 RX ADMIN — DEXMEDETOMIDINE HYDROCHLORIDE IN 0.9% SODIUM CHLORIDE 5.21 MICROGRAM(S)/KG/HR: 4 INJECTION INTRAVENOUS at 21:00

## 2023-05-24 RX ADMIN — Medication 20 MILLIGRAM(S): at 21:00

## 2023-05-24 NOTE — PRE-ANESTHESIA EVALUATION ADULT - NSANTHOSAYNRD_GEN_A_CORE
No. JEOVANNY screening performed.  STOP BANG Legend: 0-2 = LOW Risk; 3-4 = INTERMEDIATE Risk; 5-8 = HIGH Risk

## 2023-05-24 NOTE — PROGRESS NOTE ADULT - SUBJECTIVE AND OBJECTIVE BOX
CTICU  CRITICAL  CARE  attending     Hand off received 					   Pertinent clinical, laboratory, radiographic, hemodynamic, echocardiographic, respiratory data, microbiologic data and chart were reviewed and analyzed frequently throughout the course of the day and night  Patient seen and examined with CTS/ SH attending at bedside    Pt is a 72y , Male, operative day s/p CABG x 3V; off pump    intra op:    2.5L crystalloids  1L Colloid  UO 1500 ml    No blood/products    Anesthesia hand off note: Very thick preload dependant LVH    post op:    on full Vent support  Hypertensive  Hyperchloremic met acidosis; NAG  Diuresed with improvement in base deficit      66yo M former smoker  with PMHx of HTN, HLD, DM, CVA (2106) no residual deficits, Alzheimers, who presented to  with CP at rest. Pt's wife states he ambulated minimally around the house and develops SOB after walking 1 block. At [facility] he was r/i NSTEMI and underwent cardiac cath revealing mv CAD. He was started on a heparin gtt and transferred to [facility] under Dr Martinez for pre-op CABG workup. Denies GI bleed, hx of CA, radiation, varicose veins, vein stripping.     Diabetes    CAD (coronary artery disease)        , FAMILY HISTORY:  PAST MEDICAL & SURGICAL HISTORY:  CVA (cerebrovascular accident)      HTN (hypertension)      HLD (hyperlipidemia)      Diabetes      AD (Alzheimer's disease)        Patient is a 72y old  Male who presents with a chief complaint of CAD (23 May 2023 16:20)      14 system review unable to assess    Vital signs, hemodynamic and respiratory parameters were reviewed from the bedside nursing flowsheet.  ICU Vital Signs Last 24 Hrs  T(C): 36.1 (25 May 2023 01:11), Max: 36.4 (24 May 2023 05:01)  T(F): 97 (25 May 2023 01:11), Max: 97.5 (24 May 2023 05:01)  HR: 55 (25 May 2023 04:00) (46 - 66)  BP: 145/68 (24 May 2023 12:37) (143/65 - 145/88)  BP(mean): 98 (24 May 2023 12:37) (93 - 98)  ABP: 133/55 (25 May 2023 04:00) (103/49 - 183/75)  ABP(mean): 79 (25 May 2023 04:00) (65 - 110)  RR: 12 (25 May 2023 04:00) (12 - 18)  SpO2: 100% (25 May 2023 04:00) (96% - 100%)    O2 Parameters below as of 25 May 2023 05:00  Patient On (Oxygen Delivery Method): ventilator    O2 Concentration (%): 40      Adult Advanced Hemodynamics Last 24 Hrs  CVP(mm Hg): 10 (25 May 2023 04:00) (2 - 26)  CVP(cm H2O): --  CO: --  CI: --  PA: --  PA(mean): --  PCWP: --  SVR: --  SVRI: --  PVR: --  PVRI: --, ABG - ( 24 May 2023 19:16 )  pH, Arterial: 7.40  pH, Blood: x     /  pCO2: 30    /  pO2: 251   / HCO3: 19    / Base Excess: -5.0  /  SaO2: 98.0              Mode: AC/ CMV (Assist Control/ Continuous Mandatory Ventilation)  RR (machine): 12  TV (machine): 500  FiO2: 50  PEEP: 5  ITime: 1  MAP: 8  PIP: 20    Intake and output was reviewed and the fluid balance was calculated  Daily Height in cm: 162.6 (24 May 2023 12:37)    Daily   I&O's Summary    23 May 2023 07:01  -  24 May 2023 07:00  --------------------------------------------------------  IN: 852.7 mL / OUT: 800 mL / NET: 52.7 mL    24 May 2023 07:01  -  25 May 2023 04:04  --------------------------------------------------------  IN: 1908.7 mL / OUT: 3145 mL / NET: -1236.3 mL        All lines and drain sites were assessed  Glycemic trend was reviewedCAPILLARY BLOOD GLUCOSE      POCT Blood Glucose.: 158 mg/dL (25 May 2023 03:14)    No acute change in mental status  (+) Orotracheally intubated  Auscultation of the chest reveals equal bs  Abdomen is soft  Extremities are warm and well perfused  Wounds appear clean and unremarkable  Antibiotics are periop    labs  CBC Full  -  ( 25 May 2023 03:00 )  WBC Count : 10.27 K/uL  RBC Count : 3.59 M/uL  Hemoglobin : 10.5 g/dL  Hematocrit : 32.0 %  Platelet Count - Automated : 163 K/uL  Mean Cell Volume : 89.1 fl  Mean Cell Hemoglobin : 29.2 pg  Mean Cell Hemoglobin Concentration : 32.8 gm/dL  Auto Neutrophil # : 8.08 K/uL  Auto Lymphocyte # : 1.48 K/uL  Auto Monocyte # : 0.58 K/uL  Auto Eosinophil # : 0.06 K/uL  Auto Basophil # : 0.04 K/uL  Auto Neutrophil % : 78.7 %  Auto Lymphocyte % : 14.4 %  Auto Monocyte % : 5.6 %  Auto Eosinophil % : 0.6 %  Auto Basophil % : 0.4 %    05-25    141  |  108  |  13  ----------------------------<  173<H>  3.8   |  22  |  0.98    Ca    8.6      25 May 2023 03:00  Phos  2.4     05-25  Mg     1.7     05-25    TPro  6.2  /  Alb  4.5  /  TBili  0.6  /  DBili  x   /  AST  26  /  ALT  26  /  AlkPhos  45  05-25    PT/INR - ( 25 May 2023 03:00 )   PT: 13.3 sec;   INR: 1.12          PTT - ( 25 May 2023 03:00 )  PTT:36.1 sec  The current medications were reviewed   MEDICATIONS  (STANDING):  acetaminophen   IVPB .. 1000 milliGRAM(s) IV Intermittent once  albumin human  5% IVPB 250 milliLiter(s) IV Intermittent once  albumin human  5% IVPB 250 milliLiter(s) IV Intermittent once  albumin human  5% IVPB 750 milliLiter(s) IV Intermittent once  aspirin enteric coated 81 milliGRAM(s) Oral daily  atorvastatin 40 milliGRAM(s) Oral at bedtime  ceFAZolin   IVPB 2000 milliGRAM(s) IV Intermittent every 8 hours  chlorhexidine 0.12% Liquid 15 milliLiter(s) Oral Mucosa every 12 hours  chlorhexidine 2% Cloths 1 Application(s) Topical daily  clopidogrel Tablet 75 milliGRAM(s) Oral daily  dexMEDEtomidine Infusion 0.3 MICROgram(s)/kG/Hr (5.21 mL/Hr) IV Continuous <Continuous>  dextrose 50% Injectable 50 milliLiter(s) IV Push every 15 minutes  dextrose 50% Injectable 25 milliLiter(s) IV Push every 15 minutes  heparin   Injectable 5000 Unit(s) SubCutaneous every 8 hours  insulin regular Infusion 1 Unit(s)/Hr (1 mL/Hr) IV Continuous <Continuous>  norepinephrine Infusion 0.03 MICROgram(s)/kG/Min (3.9 mL/Hr) IV Continuous <Continuous>  pantoprazole    Tablet 40 milliGRAM(s) Oral daily  phenylephrine    Infusion 0.3 MICROgram(s)/kG/Min (7.81 mL/Hr) IV Continuous <Continuous>  polyethylene glycol 3350 17 Gram(s) Oral daily  propofol Infusion 25 MICROgram(s)/kG/Min (10.4 mL/Hr) IV Continuous <Continuous>  sodium chloride 0.9%. 1000 milliLiter(s) (10 mL/Hr) IV Continuous <Continuous>    MEDICATIONS  (PRN):       PROBLEM LIST/ ASSESSMENT:  HEALTH ISSUES - PROBLEM Dx:  Diabetes    CAD (coronary artery disease)        ,   Patient is a 72y old  Male who presents with a chief complaint of CAD (23 May 2023 16:20)     s/p OPCAB x 3V      My plan includes :    Titrate pressor support to maintain MAP >70 ( hx of CVA)  Continue full Vent support overnight  Titrate Fio2 to maintain Sao2 >95%  Wean and extubate in the am  Serial ABGs  Strict glucose control    close hemodynamic, ventilatory and drain monitoring and management per post op routine    Monitor for arrhythmias and monitor parameters for organ perfusion  monitor neurologic status  Head of the bed should remain elevated to 45 deg .   chest PT and IS will be encouraged  monitor adequacy of oxygenation and ventilation and attempt to wean oxygen  Nutritional goals will be met using po eventually , ensure adequate caloric intake and montior the same  Stress ulcer and VTE prophylaxis will be achieved    Glycemic control is satisfactory  Electrolytes have been repleted as necessary and wound care has been carried out. Pain control has been achieved.   agressive physical therapy and early mobility and ambulation goals will be met   The family was updated about the course and plan  CRITICAL CARE TIME SPENT in evaluation and management, reassessments, review and interpretation of labs and x-rays, ventilator and hemodynamic management, formulating a plan and coordinating care: ___90____ MIN.  Time does not include procedural time.  CTICU ATTENDING     					    Alireza Laurent MD

## 2023-05-25 LAB
ALBUMIN SERPL ELPH-MCNC: 4.4 G/DL — SIGNIFICANT CHANGE UP (ref 3.3–5)
ALBUMIN SERPL ELPH-MCNC: 4.5 G/DL — SIGNIFICANT CHANGE UP (ref 3.3–5)
ALBUMIN SERPL ELPH-MCNC: 4.6 G/DL — SIGNIFICANT CHANGE UP (ref 3.3–5)
ALBUMIN SERPL ELPH-MCNC: 4.9 G/DL — SIGNIFICANT CHANGE UP (ref 3.3–5)
ALP SERPL-CCNC: 36 U/L — LOW (ref 40–120)
ALP SERPL-CCNC: 38 U/L — LOW (ref 40–120)
ALP SERPL-CCNC: 39 U/L — LOW (ref 40–120)
ALP SERPL-CCNC: 45 U/L — SIGNIFICANT CHANGE UP (ref 40–120)
ALT FLD-CCNC: 13 U/L — SIGNIFICANT CHANGE UP (ref 10–45)
ALT FLD-CCNC: 15 U/L — SIGNIFICANT CHANGE UP (ref 10–45)
ALT FLD-CCNC: 15 U/L — SIGNIFICANT CHANGE UP (ref 10–45)
ALT FLD-CCNC: 26 U/L — SIGNIFICANT CHANGE UP (ref 10–45)
ANION GAP SERPL CALC-SCNC: 10 MMOL/L — SIGNIFICANT CHANGE UP (ref 5–17)
ANION GAP SERPL CALC-SCNC: 11 MMOL/L — SIGNIFICANT CHANGE UP (ref 5–17)
ANION GAP SERPL CALC-SCNC: 12 MMOL/L — SIGNIFICANT CHANGE UP (ref 5–17)
ANION GAP SERPL CALC-SCNC: 12 MMOL/L — SIGNIFICANT CHANGE UP (ref 5–17)
APTT BLD: 33.8 SEC — SIGNIFICANT CHANGE UP (ref 27.5–35.5)
APTT BLD: 36.1 SEC — HIGH (ref 27.5–35.5)
APTT BLD: 36.8 SEC — HIGH (ref 27.5–35.5)
APTT BLD: 38.6 SEC — HIGH (ref 27.5–35.5)
AST SERPL-CCNC: 18 U/L — SIGNIFICANT CHANGE UP (ref 10–40)
AST SERPL-CCNC: 19 U/L — SIGNIFICANT CHANGE UP (ref 10–40)
AST SERPL-CCNC: 19 U/L — SIGNIFICANT CHANGE UP (ref 10–40)
AST SERPL-CCNC: 26 U/L — SIGNIFICANT CHANGE UP (ref 10–40)
BASE EXCESS BLDV CALC-SCNC: -2 MMOL/L — SIGNIFICANT CHANGE UP (ref -2–3)
BASOPHILS # BLD AUTO: 0.03 K/UL — SIGNIFICANT CHANGE UP (ref 0–0.2)
BASOPHILS # BLD AUTO: 0.04 K/UL — SIGNIFICANT CHANGE UP (ref 0–0.2)
BASOPHILS # BLD AUTO: 0.04 K/UL — SIGNIFICANT CHANGE UP (ref 0–0.2)
BASOPHILS # BLD AUTO: 0.05 K/UL — SIGNIFICANT CHANGE UP (ref 0–0.2)
BASOPHILS NFR BLD AUTO: 0.3 % — SIGNIFICANT CHANGE UP (ref 0–2)
BASOPHILS NFR BLD AUTO: 0.4 % — SIGNIFICANT CHANGE UP (ref 0–2)
BASOPHILS NFR BLD AUTO: 0.4 % — SIGNIFICANT CHANGE UP (ref 0–2)
BASOPHILS NFR BLD AUTO: 0.5 % — SIGNIFICANT CHANGE UP (ref 0–2)
BILIRUB SERPL-MCNC: 0.4 MG/DL — SIGNIFICANT CHANGE UP (ref 0.2–1.2)
BILIRUB SERPL-MCNC: 0.5 MG/DL — SIGNIFICANT CHANGE UP (ref 0.2–1.2)
BILIRUB SERPL-MCNC: 0.6 MG/DL — SIGNIFICANT CHANGE UP (ref 0.2–1.2)
BILIRUB SERPL-MCNC: 0.8 MG/DL — SIGNIFICANT CHANGE UP (ref 0.2–1.2)
BUN SERPL-MCNC: 13 MG/DL — SIGNIFICANT CHANGE UP (ref 7–23)
BUN SERPL-MCNC: 13 MG/DL — SIGNIFICANT CHANGE UP (ref 7–23)
BUN SERPL-MCNC: 14 MG/DL — SIGNIFICANT CHANGE UP (ref 7–23)
BUN SERPL-MCNC: 14 MG/DL — SIGNIFICANT CHANGE UP (ref 7–23)
CALCIUM SERPL-MCNC: 8.2 MG/DL — LOW (ref 8.4–10.5)
CALCIUM SERPL-MCNC: 8.5 MG/DL — SIGNIFICANT CHANGE UP (ref 8.4–10.5)
CALCIUM SERPL-MCNC: 8.5 MG/DL — SIGNIFICANT CHANGE UP (ref 8.4–10.5)
CALCIUM SERPL-MCNC: 8.6 MG/DL — SIGNIFICANT CHANGE UP (ref 8.4–10.5)
CHLORIDE SERPL-SCNC: 106 MMOL/L — SIGNIFICANT CHANGE UP (ref 96–108)
CHLORIDE SERPL-SCNC: 107 MMOL/L — SIGNIFICANT CHANGE UP (ref 96–108)
CHLORIDE SERPL-SCNC: 108 MMOL/L — SIGNIFICANT CHANGE UP (ref 96–108)
CHLORIDE SERPL-SCNC: 108 MMOL/L — SIGNIFICANT CHANGE UP (ref 96–108)
CO2 BLDV-SCNC: 25 MMOL/L — SIGNIFICANT CHANGE UP (ref 22–26)
CO2 SERPL-SCNC: 22 MMOL/L — SIGNIFICANT CHANGE UP (ref 22–31)
CO2 SERPL-SCNC: 22 MMOL/L — SIGNIFICANT CHANGE UP (ref 22–31)
CO2 SERPL-SCNC: 23 MMOL/L — SIGNIFICANT CHANGE UP (ref 22–31)
CO2 SERPL-SCNC: 23 MMOL/L — SIGNIFICANT CHANGE UP (ref 22–31)
CREAT SERPL-MCNC: 0.98 MG/DL — SIGNIFICANT CHANGE UP (ref 0.5–1.3)
CREAT SERPL-MCNC: 1.04 MG/DL — SIGNIFICANT CHANGE UP (ref 0.5–1.3)
CREAT SERPL-MCNC: 1.06 MG/DL — SIGNIFICANT CHANGE UP (ref 0.5–1.3)
CREAT SERPL-MCNC: 1.3 MG/DL — SIGNIFICANT CHANGE UP (ref 0.5–1.3)
EGFR: 58 ML/MIN/1.73M2 — LOW
EGFR: 74 ML/MIN/1.73M2 — SIGNIFICANT CHANGE UP
EGFR: 76 ML/MIN/1.73M2 — SIGNIFICANT CHANGE UP
EGFR: 81 ML/MIN/1.73M2 — SIGNIFICANT CHANGE UP
EOSINOPHIL # BLD AUTO: 0.02 K/UL — SIGNIFICANT CHANGE UP (ref 0–0.5)
EOSINOPHIL # BLD AUTO: 0.02 K/UL — SIGNIFICANT CHANGE UP (ref 0–0.5)
EOSINOPHIL # BLD AUTO: 0.03 K/UL — SIGNIFICANT CHANGE UP (ref 0–0.5)
EOSINOPHIL # BLD AUTO: 0.06 K/UL — SIGNIFICANT CHANGE UP (ref 0–0.5)
EOSINOPHIL NFR BLD AUTO: 0.2 % — SIGNIFICANT CHANGE UP (ref 0–6)
EOSINOPHIL NFR BLD AUTO: 0.2 % — SIGNIFICANT CHANGE UP (ref 0–6)
EOSINOPHIL NFR BLD AUTO: 0.3 % — SIGNIFICANT CHANGE UP (ref 0–6)
EOSINOPHIL NFR BLD AUTO: 0.6 % — SIGNIFICANT CHANGE UP (ref 0–6)
GAS PNL BLDA: SIGNIFICANT CHANGE UP
GLUCOSE BLDC GLUCOMTR-MCNC: 102 MG/DL — HIGH (ref 70–99)
GLUCOSE BLDC GLUCOMTR-MCNC: 109 MG/DL — HIGH (ref 70–99)
GLUCOSE BLDC GLUCOMTR-MCNC: 119 MG/DL — HIGH (ref 70–99)
GLUCOSE BLDC GLUCOMTR-MCNC: 128 MG/DL — HIGH (ref 70–99)
GLUCOSE BLDC GLUCOMTR-MCNC: 140 MG/DL — HIGH (ref 70–99)
GLUCOSE BLDC GLUCOMTR-MCNC: 158 MG/DL — HIGH (ref 70–99)
GLUCOSE BLDC GLUCOMTR-MCNC: 160 MG/DL — HIGH (ref 70–99)
GLUCOSE BLDC GLUCOMTR-MCNC: 166 MG/DL — HIGH (ref 70–99)
GLUCOSE BLDC GLUCOMTR-MCNC: 175 MG/DL — HIGH (ref 70–99)
GLUCOSE BLDC GLUCOMTR-MCNC: 176 MG/DL — HIGH (ref 70–99)
GLUCOSE BLDC GLUCOMTR-MCNC: 179 MG/DL — HIGH (ref 70–99)
GLUCOSE BLDC GLUCOMTR-MCNC: 261 MG/DL — HIGH (ref 70–99)
GLUCOSE BLDC GLUCOMTR-MCNC: 78 MG/DL — SIGNIFICANT CHANGE UP (ref 70–99)
GLUCOSE BLDC GLUCOMTR-MCNC: 80 MG/DL — SIGNIFICANT CHANGE UP (ref 70–99)
GLUCOSE BLDC GLUCOMTR-MCNC: 94 MG/DL — SIGNIFICANT CHANGE UP (ref 70–99)
GLUCOSE SERPL-MCNC: 116 MG/DL — HIGH (ref 70–99)
GLUCOSE SERPL-MCNC: 173 MG/DL — HIGH (ref 70–99)
GLUCOSE SERPL-MCNC: 185 MG/DL — HIGH (ref 70–99)
GLUCOSE SERPL-MCNC: 301 MG/DL — HIGH (ref 70–99)
HCO3 BLDV-SCNC: 24 MMOL/L — SIGNIFICANT CHANGE UP (ref 22–29)
HCT VFR BLD CALC: 26.7 % — LOW (ref 39–50)
HCT VFR BLD CALC: 27.1 % — LOW (ref 39–50)
HCT VFR BLD CALC: 28.3 % — LOW (ref 39–50)
HCT VFR BLD CALC: 32 % — LOW (ref 39–50)
HGB BLD-MCNC: 10.5 G/DL — LOW (ref 13–17)
HGB BLD-MCNC: 8.7 G/DL — LOW (ref 13–17)
HGB BLD-MCNC: 8.8 G/DL — LOW (ref 13–17)
HGB BLD-MCNC: 9.3 G/DL — LOW (ref 13–17)
IMM GRANULOCYTES NFR BLD AUTO: 0.2 % — SIGNIFICANT CHANGE UP (ref 0–0.9)
IMM GRANULOCYTES NFR BLD AUTO: 0.3 % — SIGNIFICANT CHANGE UP (ref 0–0.9)
IMM GRANULOCYTES NFR BLD AUTO: 0.3 % — SIGNIFICANT CHANGE UP (ref 0–0.9)
IMM GRANULOCYTES NFR BLD AUTO: 0.5 % — SIGNIFICANT CHANGE UP (ref 0–0.9)
INR BLD: 1.12 — SIGNIFICANT CHANGE UP (ref 0.88–1.16)
INR BLD: 1.14 — SIGNIFICANT CHANGE UP (ref 0.88–1.16)
INR BLD: 1.15 — SIGNIFICANT CHANGE UP (ref 0.88–1.16)
INR BLD: 1.18 — HIGH (ref 0.88–1.16)
ISTAT ARTERIAL BE: -4 MMOL/L — LOW (ref -2–3)
ISTAT ARTERIAL GLUCOSE: 165 MG/DL — HIGH (ref 70–99)
ISTAT ARTERIAL HCO3: 21 MMOL/L — LOW (ref 22–26)
ISTAT ARTERIAL HEMATOCRIT: 29 % — LOW (ref 39–50)
ISTAT ARTERIAL HEMOGLOBIN: 9.9 G/DL — LOW (ref 13–17)
ISTAT ARTERIAL IONIZED CALCIUM: 1.18 MMOL/L — SIGNIFICANT CHANGE UP (ref 1.12–1.3)
ISTAT ARTERIAL PCO2: 34 MMHG — LOW (ref 35–45)
ISTAT ARTERIAL PH: 7.38 — SIGNIFICANT CHANGE UP (ref 7.35–7.45)
ISTAT ARTERIAL PO2: 298 MMHG — HIGH (ref 80–105)
ISTAT ARTERIAL POTASSIUM: 4 MMOL/L — SIGNIFICANT CHANGE UP (ref 3.5–5.3)
ISTAT ARTERIAL SO2: 100 % — HIGH (ref 95–98)
ISTAT ARTERIAL SODIUM: 144 MMOL/L — SIGNIFICANT CHANGE UP (ref 135–145)
ISTAT ARTERIAL TCO2: 22 MMOL/L — SIGNIFICANT CHANGE UP (ref 22–31)
LYMPHOCYTES # BLD AUTO: 1.13 K/UL — SIGNIFICANT CHANGE UP (ref 1–3.3)
LYMPHOCYTES # BLD AUTO: 1.45 K/UL — SIGNIFICANT CHANGE UP (ref 1–3.3)
LYMPHOCYTES # BLD AUTO: 1.47 K/UL — SIGNIFICANT CHANGE UP (ref 1–3.3)
LYMPHOCYTES # BLD AUTO: 1.48 K/UL — SIGNIFICANT CHANGE UP (ref 1–3.3)
LYMPHOCYTES # BLD AUTO: 11.8 % — LOW (ref 13–44)
LYMPHOCYTES # BLD AUTO: 14.4 % — SIGNIFICANT CHANGE UP (ref 13–44)
LYMPHOCYTES # BLD AUTO: 15.2 % — SIGNIFICANT CHANGE UP (ref 13–44)
LYMPHOCYTES # BLD AUTO: 15.5 % — SIGNIFICANT CHANGE UP (ref 13–44)
MAGNESIUM SERPL-MCNC: 1.7 MG/DL — SIGNIFICANT CHANGE UP (ref 1.6–2.6)
MAGNESIUM SERPL-MCNC: 2 MG/DL — SIGNIFICANT CHANGE UP (ref 1.6–2.6)
MAGNESIUM SERPL-MCNC: 2.1 MG/DL — SIGNIFICANT CHANGE UP (ref 1.6–2.6)
MAGNESIUM SERPL-MCNC: 2.1 MG/DL — SIGNIFICANT CHANGE UP (ref 1.6–2.6)
MCHC RBC-ENTMCNC: 29 PG — SIGNIFICANT CHANGE UP (ref 27–34)
MCHC RBC-ENTMCNC: 29.2 PG — SIGNIFICANT CHANGE UP (ref 27–34)
MCHC RBC-ENTMCNC: 29.3 PG — SIGNIFICANT CHANGE UP (ref 27–34)
MCHC RBC-ENTMCNC: 29.6 PG — SIGNIFICANT CHANGE UP (ref 27–34)
MCHC RBC-ENTMCNC: 32.5 GM/DL — SIGNIFICANT CHANGE UP (ref 32–36)
MCHC RBC-ENTMCNC: 32.6 GM/DL — SIGNIFICANT CHANGE UP (ref 32–36)
MCHC RBC-ENTMCNC: 32.8 GM/DL — SIGNIFICANT CHANGE UP (ref 32–36)
MCHC RBC-ENTMCNC: 32.9 GM/DL — SIGNIFICANT CHANGE UP (ref 32–36)
MCV RBC AUTO: 89.1 FL — SIGNIFICANT CHANGE UP (ref 80–100)
MCV RBC AUTO: 89.4 FL — SIGNIFICANT CHANGE UP (ref 80–100)
MCV RBC AUTO: 89.9 FL — SIGNIFICANT CHANGE UP (ref 80–100)
MCV RBC AUTO: 90.1 FL — SIGNIFICANT CHANGE UP (ref 80–100)
MONOCYTES # BLD AUTO: 0.58 K/UL — SIGNIFICANT CHANGE UP (ref 0–0.9)
MONOCYTES # BLD AUTO: 0.73 K/UL — SIGNIFICANT CHANGE UP (ref 0–0.9)
MONOCYTES # BLD AUTO: 0.78 K/UL — SIGNIFICANT CHANGE UP (ref 0–0.9)
MONOCYTES # BLD AUTO: 0.81 K/UL — SIGNIFICANT CHANGE UP (ref 0–0.9)
MONOCYTES NFR BLD AUTO: 5.6 % — SIGNIFICANT CHANGE UP (ref 2–14)
MONOCYTES NFR BLD AUTO: 7.7 % — SIGNIFICANT CHANGE UP (ref 2–14)
MONOCYTES NFR BLD AUTO: 8.2 % — SIGNIFICANT CHANGE UP (ref 2–14)
MONOCYTES NFR BLD AUTO: 8.4 % — SIGNIFICANT CHANGE UP (ref 2–14)
NEUTROPHILS # BLD AUTO: 7.22 K/UL — SIGNIFICANT CHANGE UP (ref 1.8–7.4)
NEUTROPHILS # BLD AUTO: 7.23 K/UL — SIGNIFICANT CHANGE UP (ref 1.8–7.4)
NEUTROPHILS # BLD AUTO: 7.56 K/UL — HIGH (ref 1.8–7.4)
NEUTROPHILS # BLD AUTO: 8.08 K/UL — HIGH (ref 1.8–7.4)
NEUTROPHILS NFR BLD AUTO: 75.5 % — SIGNIFICANT CHANGE UP (ref 43–77)
NEUTROPHILS NFR BLD AUTO: 76 % — SIGNIFICANT CHANGE UP (ref 43–77)
NEUTROPHILS NFR BLD AUTO: 78.7 % — HIGH (ref 43–77)
NEUTROPHILS NFR BLD AUTO: 78.8 % — HIGH (ref 43–77)
NRBC # BLD: 0 /100 WBCS — SIGNIFICANT CHANGE UP (ref 0–0)
PCO2 BLDV: 43 MMHG — SIGNIFICANT CHANGE UP (ref 42–55)
PH BLDV: 7.35 — SIGNIFICANT CHANGE UP (ref 7.32–7.43)
PHOSPHATE SERPL-MCNC: 2.1 MG/DL — LOW (ref 2.5–4.5)
PHOSPHATE SERPL-MCNC: 2.4 MG/DL — LOW (ref 2.5–4.5)
PHOSPHATE SERPL-MCNC: 2.6 MG/DL — SIGNIFICANT CHANGE UP (ref 2.5–4.5)
PHOSPHATE SERPL-MCNC: 2.6 MG/DL — SIGNIFICANT CHANGE UP (ref 2.5–4.5)
PLATELET # BLD AUTO: 145 K/UL — LOW (ref 150–400)
PLATELET # BLD AUTO: 148 K/UL — LOW (ref 150–400)
PLATELET # BLD AUTO: 150 K/UL — SIGNIFICANT CHANGE UP (ref 150–400)
PLATELET # BLD AUTO: 163 K/UL — SIGNIFICANT CHANGE UP (ref 150–400)
PO2 BLDV: 42 MMHG — SIGNIFICANT CHANGE UP (ref 25–45)
POTASSIUM SERPL-MCNC: 3.8 MMOL/L — SIGNIFICANT CHANGE UP (ref 3.5–5.3)
POTASSIUM SERPL-MCNC: 3.8 MMOL/L — SIGNIFICANT CHANGE UP (ref 3.5–5.3)
POTASSIUM SERPL-MCNC: 4 MMOL/L — SIGNIFICANT CHANGE UP (ref 3.5–5.3)
POTASSIUM SERPL-MCNC: 4.1 MMOL/L — SIGNIFICANT CHANGE UP (ref 3.5–5.3)
POTASSIUM SERPL-SCNC: 3.8 MMOL/L — SIGNIFICANT CHANGE UP (ref 3.5–5.3)
POTASSIUM SERPL-SCNC: 3.8 MMOL/L — SIGNIFICANT CHANGE UP (ref 3.5–5.3)
POTASSIUM SERPL-SCNC: 4 MMOL/L — SIGNIFICANT CHANGE UP (ref 3.5–5.3)
POTASSIUM SERPL-SCNC: 4.1 MMOL/L — SIGNIFICANT CHANGE UP (ref 3.5–5.3)
PROT SERPL-MCNC: 5.9 G/DL — LOW (ref 6–8.3)
PROT SERPL-MCNC: 6.1 G/DL — SIGNIFICANT CHANGE UP (ref 6–8.3)
PROT SERPL-MCNC: 6.2 G/DL — SIGNIFICANT CHANGE UP (ref 6–8.3)
PROT SERPL-MCNC: 6.5 G/DL — SIGNIFICANT CHANGE UP (ref 6–8.3)
PROTHROM AB SERPL-ACNC: 13.3 SEC — SIGNIFICANT CHANGE UP (ref 10.5–13.4)
PROTHROM AB SERPL-ACNC: 13.6 SEC — HIGH (ref 10.5–13.4)
PROTHROM AB SERPL-ACNC: 13.7 SEC — HIGH (ref 10.5–13.4)
PROTHROM AB SERPL-ACNC: 14.1 SEC — HIGH (ref 10.5–13.4)
RBC # BLD: 2.97 M/UL — LOW (ref 4.2–5.8)
RBC # BLD: 3.03 M/UL — LOW (ref 4.2–5.8)
RBC # BLD: 3.14 M/UL — LOW (ref 4.2–5.8)
RBC # BLD: 3.59 M/UL — LOW (ref 4.2–5.8)
RBC # FLD: 13.6 % — SIGNIFICANT CHANGE UP (ref 10.3–14.5)
RBC # FLD: 13.7 % — SIGNIFICANT CHANGE UP (ref 10.3–14.5)
RBC # FLD: 13.8 % — SIGNIFICANT CHANGE UP (ref 10.3–14.5)
RBC # FLD: 14.1 % — SIGNIFICANT CHANGE UP (ref 10.3–14.5)
SAO2 % BLDV: 71.9 % — SIGNIFICANT CHANGE UP (ref 67–88)
SODIUM SERPL-SCNC: 140 MMOL/L — SIGNIFICANT CHANGE UP (ref 135–145)
SODIUM SERPL-SCNC: 141 MMOL/L — SIGNIFICANT CHANGE UP (ref 135–145)
SODIUM SERPL-SCNC: 141 MMOL/L — SIGNIFICANT CHANGE UP (ref 135–145)
SODIUM SERPL-SCNC: 142 MMOL/L — SIGNIFICANT CHANGE UP (ref 135–145)
WBC # BLD: 10.27 K/UL — SIGNIFICANT CHANGE UP (ref 3.8–10.5)
WBC # BLD: 9.51 K/UL — SIGNIFICANT CHANGE UP (ref 3.8–10.5)
WBC # BLD: 9.56 K/UL — SIGNIFICANT CHANGE UP (ref 3.8–10.5)
WBC # BLD: 9.6 K/UL — SIGNIFICANT CHANGE UP (ref 3.8–10.5)
WBC # FLD AUTO: 10.27 K/UL — SIGNIFICANT CHANGE UP (ref 3.8–10.5)
WBC # FLD AUTO: 9.51 K/UL — SIGNIFICANT CHANGE UP (ref 3.8–10.5)
WBC # FLD AUTO: 9.56 K/UL — SIGNIFICANT CHANGE UP (ref 3.8–10.5)
WBC # FLD AUTO: 9.6 K/UL — SIGNIFICANT CHANGE UP (ref 3.8–10.5)

## 2023-05-25 PROCEDURE — 99291 CRITICAL CARE FIRST HOUR: CPT

## 2023-05-25 PROCEDURE — 99221 1ST HOSP IP/OBS SF/LOW 40: CPT

## 2023-05-25 PROCEDURE — 99292 CRITICAL CARE ADDL 30 MIN: CPT

## 2023-05-25 PROCEDURE — 71045 X-RAY EXAM CHEST 1 VIEW: CPT | Mod: 26

## 2023-05-25 RX ORDER — MAGNESIUM SULFATE 500 MG/ML
2 VIAL (ML) INJECTION ONCE
Refills: 0 | Status: COMPLETED | OUTPATIENT
Start: 2023-05-25 | End: 2023-05-25

## 2023-05-25 RX ORDER — DEXTROSE 50 % IN WATER 50 %
15 SYRINGE (ML) INTRAVENOUS ONCE
Refills: 0 | Status: DISCONTINUED | OUTPATIENT
Start: 2023-05-25 | End: 2023-06-02

## 2023-05-25 RX ORDER — SODIUM CHLORIDE 9 MG/ML
1000 INJECTION, SOLUTION INTRAVENOUS
Refills: 0 | Status: DISCONTINUED | OUTPATIENT
Start: 2023-05-25 | End: 2023-06-02

## 2023-05-25 RX ORDER — INSULIN LISPRO 100/ML
VIAL (ML) SUBCUTANEOUS
Refills: 0 | Status: DISCONTINUED | OUTPATIENT
Start: 2023-05-25 | End: 2023-06-02

## 2023-05-25 RX ORDER — FUROSEMIDE 40 MG
20 TABLET ORAL ONCE
Refills: 0 | Status: COMPLETED | OUTPATIENT
Start: 2023-05-25 | End: 2023-05-25

## 2023-05-25 RX ORDER — METOPROLOL TARTRATE 50 MG
2.5 TABLET ORAL ONCE
Refills: 0 | Status: COMPLETED | OUTPATIENT
Start: 2023-05-25 | End: 2023-05-25

## 2023-05-25 RX ORDER — POTASSIUM CHLORIDE 20 MEQ
40 PACKET (EA) ORAL ONCE
Refills: 0 | Status: COMPLETED | OUTPATIENT
Start: 2023-05-25 | End: 2023-05-25

## 2023-05-25 RX ORDER — SODIUM,POTASSIUM PHOSPHATES 278-250MG
1 POWDER IN PACKET (EA) ORAL
Refills: 0 | Status: COMPLETED | OUTPATIENT
Start: 2023-05-25 | End: 2023-05-25

## 2023-05-25 RX ORDER — ALBUMIN HUMAN 25 %
250 VIAL (ML) INTRAVENOUS ONCE
Refills: 0 | Status: COMPLETED | OUTPATIENT
Start: 2023-05-25 | End: 2023-05-25

## 2023-05-25 RX ORDER — CALCIUM GLUCONATE 100 MG/ML
2 VIAL (ML) INTRAVENOUS ONCE
Refills: 0 | Status: COMPLETED | OUTPATIENT
Start: 2023-05-25 | End: 2023-05-25

## 2023-05-25 RX ORDER — HUMAN INSULIN 100 [IU]/ML
14 INJECTION, SUSPENSION SUBCUTANEOUS ONCE
Refills: 0 | Status: COMPLETED | OUTPATIENT
Start: 2023-05-25 | End: 2023-05-25

## 2023-05-25 RX ORDER — METOPROLOL TARTRATE 50 MG
12.5 TABLET ORAL EVERY 6 HOURS
Refills: 0 | Status: DISCONTINUED | OUTPATIENT
Start: 2023-05-25 | End: 2023-05-28

## 2023-05-25 RX ORDER — GLUCAGON INJECTION, SOLUTION 0.5 MG/.1ML
1 INJECTION, SOLUTION SUBCUTANEOUS ONCE
Refills: 0 | Status: DISCONTINUED | OUTPATIENT
Start: 2023-05-25 | End: 2023-06-02

## 2023-05-25 RX ORDER — PHENYLEPHRINE HYDROCHLORIDE 10 MG/ML
0.3 INJECTION INTRAVENOUS
Qty: 40 | Refills: 0 | Status: DISCONTINUED | OUTPATIENT
Start: 2023-05-25 | End: 2023-05-25

## 2023-05-25 RX ORDER — OXYCODONE HYDROCHLORIDE 5 MG/1
5 TABLET ORAL EVERY 6 HOURS
Refills: 0 | Status: DISCONTINUED | OUTPATIENT
Start: 2023-05-25 | End: 2023-06-01

## 2023-05-25 RX ORDER — AMIODARONE HYDROCHLORIDE 400 MG/1
150 TABLET ORAL ONCE
Refills: 0 | Status: COMPLETED | OUTPATIENT
Start: 2023-05-25 | End: 2023-05-25

## 2023-05-25 RX ORDER — ACETAMINOPHEN 500 MG
650 TABLET ORAL EVERY 6 HOURS
Refills: 0 | Status: DISCONTINUED | OUTPATIENT
Start: 2023-05-25 | End: 2023-06-02

## 2023-05-25 RX ORDER — POTASSIUM CHLORIDE 20 MEQ
20 PACKET (EA) ORAL ONCE
Refills: 0 | Status: COMPLETED | OUTPATIENT
Start: 2023-05-25 | End: 2023-05-25

## 2023-05-25 RX ORDER — METOPROLOL TARTRATE 50 MG
12.5 TABLET ORAL EVERY 12 HOURS
Refills: 0 | Status: DISCONTINUED | OUTPATIENT
Start: 2023-05-25 | End: 2023-05-25

## 2023-05-25 RX ORDER — OXYCODONE HYDROCHLORIDE 5 MG/1
10 TABLET ORAL EVERY 6 HOURS
Refills: 0 | Status: DISCONTINUED | OUTPATIENT
Start: 2023-05-25 | End: 2023-06-01

## 2023-05-25 RX ORDER — INSULIN LISPRO 100/ML
3 VIAL (ML) SUBCUTANEOUS
Refills: 0 | Status: DISCONTINUED | OUTPATIENT
Start: 2023-05-25 | End: 2023-05-26

## 2023-05-25 RX ORDER — INSULIN LISPRO 100/ML
3 VIAL (ML) SUBCUTANEOUS
Refills: 0 | Status: DISCONTINUED | OUTPATIENT
Start: 2023-05-25 | End: 2023-05-25

## 2023-05-25 RX ORDER — INSULIN GLARGINE 100 [IU]/ML
24 INJECTION, SOLUTION SUBCUTANEOUS AT BEDTIME
Refills: 0 | Status: DISCONTINUED | OUTPATIENT
Start: 2023-05-25 | End: 2023-05-26

## 2023-05-25 RX ORDER — ALBUMIN HUMAN 25 %
250 VIAL (ML) INTRAVENOUS
Refills: 0 | Status: COMPLETED | OUTPATIENT
Start: 2023-05-25 | End: 2023-05-25

## 2023-05-25 RX ORDER — SENNA PLUS 8.6 MG/1
2 TABLET ORAL AT BEDTIME
Refills: 0 | Status: DISCONTINUED | OUTPATIENT
Start: 2023-05-25 | End: 2023-06-02

## 2023-05-25 RX ADMIN — Medication 100 MILLIGRAM(S): at 13:04

## 2023-05-25 RX ADMIN — ATORVASTATIN CALCIUM 40 MILLIGRAM(S): 80 TABLET, FILM COATED ORAL at 21:06

## 2023-05-25 RX ADMIN — Medication 125 MILLILITER(S): at 09:00

## 2023-05-25 RX ADMIN — CHLORHEXIDINE GLUCONATE 1 APPLICATION(S): 213 SOLUTION TOPICAL at 13:04

## 2023-05-25 RX ADMIN — OXYCODONE HYDROCHLORIDE 5 MILLIGRAM(S): 5 TABLET ORAL at 22:06

## 2023-05-25 RX ADMIN — Medication 125 MILLILITER(S): at 01:05

## 2023-05-25 RX ADMIN — OXYCODONE HYDROCHLORIDE 10 MILLIGRAM(S): 5 TABLET ORAL at 16:24

## 2023-05-25 RX ADMIN — AMIODARONE HYDROCHLORIDE 133.33 MILLIGRAM(S): 400 TABLET ORAL at 23:00

## 2023-05-25 RX ADMIN — Medication 6: at 22:11

## 2023-05-25 RX ADMIN — Medication 2: at 16:50

## 2023-05-25 RX ADMIN — AMIODARONE HYDROCHLORIDE 133.33 MILLIGRAM(S): 400 TABLET ORAL at 23:56

## 2023-05-25 RX ADMIN — Medication 40 MILLIEQUIVALENT(S): at 23:55

## 2023-05-25 RX ADMIN — PROPOFOL 10.4 MICROGRAM(S)/KG/MIN: 10 INJECTION, EMULSION INTRAVENOUS at 00:00

## 2023-05-25 RX ADMIN — HEPARIN SODIUM 5000 UNIT(S): 5000 INJECTION INTRAVENOUS; SUBCUTANEOUS at 05:11

## 2023-05-25 RX ADMIN — Medication 3 UNIT(S): at 12:38

## 2023-05-25 RX ADMIN — Medication 100 MILLIGRAM(S): at 21:06

## 2023-05-25 RX ADMIN — Medication 12.5 MILLIGRAM(S): at 23:56

## 2023-05-25 RX ADMIN — CHLORHEXIDINE GLUCONATE 15 MILLILITER(S): 213 SOLUTION TOPICAL at 05:11

## 2023-05-25 RX ADMIN — Medication 81 MILLIGRAM(S): at 12:37

## 2023-05-25 RX ADMIN — OXYCODONE HYDROCHLORIDE 10 MILLIGRAM(S): 5 TABLET ORAL at 17:20

## 2023-05-25 RX ADMIN — HEPARIN SODIUM 5000 UNIT(S): 5000 INJECTION INTRAVENOUS; SUBCUTANEOUS at 21:06

## 2023-05-25 RX ADMIN — Medication 1 PACKET(S): at 17:11

## 2023-05-25 RX ADMIN — Medication 125 MILLILITER(S): at 11:00

## 2023-05-25 RX ADMIN — Medication 3 UNIT(S): at 16:50

## 2023-05-25 RX ADMIN — HUMAN INSULIN 14 UNIT(S): 100 INJECTION, SUSPENSION SUBCUTANEOUS at 12:38

## 2023-05-25 RX ADMIN — Medication 100 MILLIEQUIVALENT(S): at 13:45

## 2023-05-25 RX ADMIN — Medication 25 GRAM(S): at 04:28

## 2023-05-25 RX ADMIN — Medication 100 MILLIEQUIVALENT(S): at 05:46

## 2023-05-25 RX ADMIN — Medication 100 MILLIGRAM(S): at 05:14

## 2023-05-25 RX ADMIN — OXYCODONE HYDROCHLORIDE 5 MILLIGRAM(S): 5 TABLET ORAL at 15:30

## 2023-05-25 RX ADMIN — Medication 400 MILLIGRAM(S): at 07:49

## 2023-05-25 RX ADMIN — Medication 125 MILLILITER(S): at 10:00

## 2023-05-25 RX ADMIN — Medication 1 PACKET(S): at 19:14

## 2023-05-25 RX ADMIN — Medication 200 GRAM(S): at 17:11

## 2023-05-25 RX ADMIN — Medication 20 MILLIGRAM(S): at 22:10

## 2023-05-25 RX ADMIN — Medication 125 MILLILITER(S): at 16:25

## 2023-05-25 RX ADMIN — SENNA PLUS 2 TABLET(S): 8.6 TABLET ORAL at 22:10

## 2023-05-25 RX ADMIN — Medication 12.5 MILLIGRAM(S): at 19:23

## 2023-05-25 RX ADMIN — PHENYLEPHRINE HYDROCHLORIDE 7.81 MICROGRAM(S)/KG/MIN: 10 INJECTION INTRAVENOUS at 03:45

## 2023-05-25 RX ADMIN — HEPARIN SODIUM 5000 UNIT(S): 5000 INJECTION INTRAVENOUS; SUBCUTANEOUS at 13:04

## 2023-05-25 RX ADMIN — CLOPIDOGREL BISULFATE 75 MILLIGRAM(S): 75 TABLET, FILM COATED ORAL at 12:37

## 2023-05-25 RX ADMIN — Medication 1000 MILLIGRAM(S): at 08:05

## 2023-05-25 RX ADMIN — OXYCODONE HYDROCHLORIDE 5 MILLIGRAM(S): 5 TABLET ORAL at 14:32

## 2023-05-25 RX ADMIN — Medication 125 MILLILITER(S): at 14:00

## 2023-05-25 RX ADMIN — Medication 125 MILLILITER(S): at 00:05

## 2023-05-25 RX ADMIN — Medication 125 MILLILITER(S): at 08:40

## 2023-05-25 RX ADMIN — INSULIN GLARGINE 24 UNIT(S): 100 INJECTION, SOLUTION SUBCUTANEOUS at 22:10

## 2023-05-25 RX ADMIN — OXYCODONE HYDROCHLORIDE 5 MILLIGRAM(S): 5 TABLET ORAL at 21:06

## 2023-05-25 RX ADMIN — PANTOPRAZOLE SODIUM 40 MILLIGRAM(S): 20 TABLET, DELAYED RELEASE ORAL at 12:37

## 2023-05-25 RX ADMIN — Medication 2.5 MILLIGRAM(S): at 19:14

## 2023-05-25 NOTE — PROGRESS NOTE ADULT - SUBJECTIVE AND OBJECTIVE BOX
SUBJECTIVE / INTERVAL HPI: Patient was seen and examined this morning. Post op CABG day 1. On insulin drip overnight requiring max 5 units, but mostly on 4 units per hour. Bridged with NPH 14 with FSG 80 and started on lispro 3 units premeal.  Preop, Lantus 30 was held for bedtime , and fasting glucose the next morning was 121. He had tolerated Lantus 30 and lispro 10 previously.    CAPILLARY BLOOD GLUCOSE & INSULIN RECEIVED  145 mg/dL (05-24 @ 05:30)  121 mg/dL (05-24 @ 05:54)  127 mg/dL (05-24 @ 11:15)  183 mg/dL (05-24 @ 19:24)  161 mg/dL (05-24 @ 21:41)  188 mg/dL (05-24 @ 23:02)  186 mg/dL (05-24 @ 23:52)  176 mg/dL (05-25 @ 00:58)  175 mg/dL (05-25 @ 01:57)  173 mg/dL (05-25 @ 03:00)  158 mg/dL (05-25 @ 03:14)  140 mg/dL (05-25 @ 04:09)  128 mg/dL (05-25 @ 05:13)  166 mg/dL (05-25 @ 06:14)  160 mg/dL (05-25 @ 06:16)  109 mg/dL (05-25 @ 06:59)  102 mg/dL (05-25 @ 08:01)  94 mg/dL (05-25 @ 09:05)  78 mg/dL (05-25 @ 09:57)  80 mg/dL (05-25 @ 10:56)      REVIEW OF SYSTEMS  Constitutional:  Negative fever, chills or loss of appetite.  Eyes:  Negative blurry vision or double vision.  Cardiovascular:  Negative for chest pain or palpitations.  Respiratory:  Negative for cough, wheezing, or shortness of breath.    Gastrointestinal:  Negative for nausea, vomiting, diarrhea, constipation, or abdominal pain.  Genitourinary:  Negative frequency, urgency or dysuria.  Neurologic:  No headache, confusion, dizziness, lightheadedness.    PHYSICAL EXAM  Vital Signs Last 24 Hrs  T(C): 36.8 (25 May 2023 10:22), Max: 36.8 (25 May 2023 10:22)  T(F): 98.2 (25 May 2023 10:22), Max: 98.2 (25 May 2023 10:22)  HR: 71 (25 May 2023 10:00) (46 - 78)  BP: 145/68 (24 May 2023 12:37) (145/68 - 145/68)  BP(mean): 98 (24 May 2023 12:37) (98 - 98)  RR: 16 (25 May 2023 10:00) (12 - 18)  SpO2: 99% (25 May 2023 10:00) (97% - 100%)    Parameters below as of 25 May 2023 11:00  Patient On (Oxygen Delivery Method): nasal cannula w/ humidification    Constitutional: Awake, alert, in no acute distress.   HEENT: Normocephalic, atraumatic, MASRHALL.  Respiratory: Lungs clear to ausculation bilaterally.   Cardiovascular: regular rhythm, normal S1 and S2, no audible murmurs. + sternotomy  GI: soft, non-tender, non-distended, bowel sounds present.  Extremities: No lower extremity edema.  Psychiatric: AAO x 3. Normal affect/mood.     LABS  CBC - WBC/HGB/HTC/PLT: 10.27/10.5/32.0/163 (05-25-23)  BMP - Na/K/Cl/Bicarb/BUN/Cr/Gluc/AG/eGFR: 141/3.8/108/22/13/0.98/173/11/81 (05-25-23)  Ca - 8.6 (05-25-23)  Phos - 2.4 (05-25-23)  Mg - 1.7 (05-25-23)  LFT - Alb/Tprot/Tbili/Dbili/AlkPhos/ALT/AST: 4.5/--/0.6/--/45/26/26 (05-25-23)  PT/aPTT/INR: 13.3/36.1/1.12 (05-25-23)   Thyroid Stimulating Hormone, Serum: 1.200 (05-20-23)      MEDICATIONS  MEDICATIONS  (STANDING):  albumin human  5% IVPB 750 milliLiter(s) IV Intermittent once  albumin human  5% IVPB 250 milliLiter(s) IV Intermittent every 1 hour  aspirin enteric coated 81 milliGRAM(s) Oral daily  atorvastatin 40 milliGRAM(s) Oral at bedtime  ceFAZolin   IVPB 2000 milliGRAM(s) IV Intermittent every 8 hours  chlorhexidine 2% Cloths 1 Application(s) Topical daily  clopidogrel Tablet 75 milliGRAM(s) Oral daily  dextrose 5%. 1000 milliLiter(s) (50 mL/Hr) IV Continuous <Continuous>  dextrose 5%. 1000 milliLiter(s) (100 mL/Hr) IV Continuous <Continuous>  dextrose 50% Injectable 50 milliLiter(s) IV Push every 15 minutes  dextrose 50% Injectable 25 milliLiter(s) IV Push every 15 minutes  glucagon  Injectable 1 milliGRAM(s) IntraMuscular once  heparin   Injectable 5000 Unit(s) SubCutaneous every 8 hours  insulin lispro (ADMELOG) corrective regimen sliding scale   SubCutaneous Before meals and at bedtime  insulin lispro Injectable (ADMELOG) 3 Unit(s) SubCutaneous three times a day before meals  insulin NPH human recombinant 14 Unit(s) SubCutaneous once  insulin regular Infusion 1 Unit(s)/Hr (1 mL/Hr) IV Continuous <Continuous>  pantoprazole    Tablet 40 milliGRAM(s) Oral daily  phenylephrine    Infusion 0.3 MICROgram(s)/kG/Min (7.81 mL/Hr) IV Continuous <Continuous>  polyethylene glycol 3350 17 Gram(s) Oral daily  senna 2 Tablet(s) Oral at bedtime  sodium chloride 0.9%. 1000 milliLiter(s) (10 mL/Hr) IV Continuous <Continuous>    MEDICATIONS  (PRN):  acetaminophen     Tablet .. 650 milliGRAM(s) Oral every 6 hours PRN Mild Pain (1 - 3)  dextrose Oral Gel 15 Gram(s) Oral once PRN Blood Glucose LESS THAN 70 milliGRAM(s)/deciliter  oxyCODONE    IR 10 milliGRAM(s) Oral every 6 hours PRN Severe Pain (7 - 10)  oxyCODONE    IR 5 milliGRAM(s) Oral every 6 hours PRN Moderate Pain (4 - 6)   SUBJECTIVE / INTERVAL HPI: Patient was seen and examined this morning. Post op CABG day 1. On insulin drip overnight requiring max 5 units, but mostly on 4 units per hour. Bridged with NPH 14 with FSG 80 and started on lispro 3 units premeal.  Preop, Lantus 30 was held for bedtime , and fasting glucose the next morning was 121. He had tolerated Lantus 30 and lispro 10 previously.    Patinet seen at OOB in chair. Denies pain. Only ate fruit cup with lunch. Reports no appetite and no nausea.    CAPILLARY BLOOD GLUCOSE & INSULIN RECEIVED  145 mg/dL (05-24 @ 05:30)  121 mg/dL (05-24 @ 05:54)  127 mg/dL (05-24 @ 11:15)  183 mg/dL (05-24 @ 19:24)  161 mg/dL (05-24 @ 21:41) - Insulin drip started at 2 units/hr.  188 mg/dL (05-24 @ 23:02)  186 mg/dL (05-24 @ 23:52) - Insulin 4 units/hr  176 mg/dL (05-25 @ 00:58)  175 mg/dL (05-25 @ 01:57)  173 mg/dL (05-25 @ 03:00)  158 mg/dL (05-25 @ 03:14)  140 mg/dL (05-25 @ 04:09)  128 mg/dL (05-25 @ 05:13)  166 mg/dL (05-25 @ 06:14) - Insulin 5 units/hr.  160 mg/dL (05-25 @ 06:16)  109 mg/dL (05-25 @ 06:59) - Insulin 3  units/hr  102 mg/dL (05-25 @ 08:01)  94 mg/dL (05-25 @ 09:05)  78 mg/dL (05-25 @ 09:57)  80 mg/dL (05-25 @ 10:56)  119 mg/dL (05-25 @ noon) NPH 14 and lispro 3      REVIEW OF SYSTEMS  Constitutional:  Negative fever, chills or loss of appetite.  Eyes:  Negative blurry vision or double vision.  Cardiovascular:  Negative for chest pain or palpitations.  Respiratory:  Negative for cough, wheezing, or shortness of breath.    Gastrointestinal:  Negative for nausea, vomiting, diarrhea, constipation, or abdominal pain.  Genitourinary:  Negative frequency, urgency or dysuria.  Neurologic:  No headache, confusion, dizziness, lightheadedness.    PHYSICAL EXAM  Vital Signs Last 24 Hrs  T(C): 36.8 (25 May 2023 10:22), Max: 36.8 (25 May 2023 10:22)  T(F): 98.2 (25 May 2023 10:22), Max: 98.2 (25 May 2023 10:22)  HR: 71 (25 May 2023 10:00) (46 - 78)  BP: 145/68 (24 May 2023 12:37) (145/68 - 145/68)  BP(mean): 98 (24 May 2023 12:37) (98 - 98)  RR: 16 (25 May 2023 10:00) (12 - 18)  SpO2: 99% (25 May 2023 10:00) (97% - 100%)    Parameters below as of 25 May 2023 11:00  Patient On (Oxygen Delivery Method): nasal cannula w/ humidification    Constitutional: Awake, alert, in no acute distress.   HEENT: Normocephalic, atraumatic, MARSHALL.  Respiratory: Lungs clear to ausculation bilaterally. + chest tube  Cardiovascular: regular rhythm, normal S1 and S2, no audible murmurs. + sternotomy  GI: soft, non-tender, non-distended, bowel sounds present.  Extremities: No lower extremity edema.  Psychiatric: AAO x 3. Normal affect/mood.     LABS  CBC - WBC/HGB/HTC/PLT: 10.27/10.5/32.0/163 (05-25-23)  BMP - Na/K/Cl/Bicarb/BUN/Cr/Gluc/AG/eGFR: 141/3.8/108/22/13/0.98/173/11/81 (05-25-23)  Ca - 8.6 (05-25-23)  Phos - 2.4 (05-25-23)  Mg - 1.7 (05-25-23)  LFT - Alb/Tprot/Tbili/Dbili/AlkPhos/ALT/AST: 4.5/--/0.6/--/45/26/26 (05-25-23)  PT/aPTT/INR: 13.3/36.1/1.12 (05-25-23)   Thyroid Stimulating Hormone, Serum: 1.200 (05-20-23)      MEDICATIONS  MEDICATIONS  (STANDING):  albumin human  5% IVPB 750 milliLiter(s) IV Intermittent once  albumin human  5% IVPB 250 milliLiter(s) IV Intermittent every 1 hour  aspirin enteric coated 81 milliGRAM(s) Oral daily  atorvastatin 40 milliGRAM(s) Oral at bedtime  ceFAZolin   IVPB 2000 milliGRAM(s) IV Intermittent every 8 hours  chlorhexidine 2% Cloths 1 Application(s) Topical daily  clopidogrel Tablet 75 milliGRAM(s) Oral daily  dextrose 5%. 1000 milliLiter(s) (50 mL/Hr) IV Continuous <Continuous>  dextrose 5%. 1000 milliLiter(s) (100 mL/Hr) IV Continuous <Continuous>  dextrose 50% Injectable 50 milliLiter(s) IV Push every 15 minutes  dextrose 50% Injectable 25 milliLiter(s) IV Push every 15 minutes  glucagon  Injectable 1 milliGRAM(s) IntraMuscular once  heparin   Injectable 5000 Unit(s) SubCutaneous every 8 hours  insulin lispro (ADMELOG) corrective regimen sliding scale   SubCutaneous Before meals and at bedtime  insulin lispro Injectable (ADMELOG) 3 Unit(s) SubCutaneous three times a day before meals  insulin NPH human recombinant 14 Unit(s) SubCutaneous once  insulin regular Infusion 1 Unit(s)/Hr (1 mL/Hr) IV Continuous <Continuous>  pantoprazole    Tablet 40 milliGRAM(s) Oral daily  phenylephrine    Infusion 0.3 MICROgram(s)/kG/Min (7.81 mL/Hr) IV Continuous <Continuous>  polyethylene glycol 3350 17 Gram(s) Oral daily  senna 2 Tablet(s) Oral at bedtime  sodium chloride 0.9%. 1000 milliLiter(s) (10 mL/Hr) IV Continuous <Continuous>    MEDICATIONS  (PRN):  acetaminophen     Tablet .. 650 milliGRAM(s) Oral every 6 hours PRN Mild Pain (1 - 3)  dextrose Oral Gel 15 Gram(s) Oral once PRN Blood Glucose LESS THAN 70 milliGRAM(s)/deciliter  oxyCODONE    IR 10 milliGRAM(s) Oral every 6 hours PRN Severe Pain (7 - 10)  oxyCODONE    IR 5 milliGRAM(s) Oral every 6 hours PRN Moderate Pain (4 - 6)   SUBJECTIVE / INTERVAL HPI: Patient was seen and examined this morning. Post op CABG day 1. On insulin drip overnight requiring max 5 units, but mostly on 4 units per hour. Bridged with NPH 14 with FSG 80 and started on lispro 3 units premeal.  Preop, Lantus 30 was held for bedtime , and fasting glucose the next morning was 121. He had tolerated Lantus 30 and lispro 10 previously.    Patinet seen at OOB in chair. Denies pain. Only ate fruit cup with lunch. Reports no appetite and no nausea.    CAPILLARY BLOOD GLUCOSE & INSULIN RECEIVED  145 mg/dL (05-24 @ 05:30)  121 mg/dL (05-24 @ 05:54)  127 mg/dL (05-24 @ 11:15)  183 mg/dL (05-24 @ 19:24)  161 mg/dL (05-24 @ 21:41) - Insulin drip started at 2 units/hr.  188 mg/dL (05-24 @ 23:02)  186 mg/dL (05-24 @ 23:52) - Insulin 4 units/hr  176 mg/dL (05-25 @ 00:58)  175 mg/dL (05-25 @ 01:57)  173 mg/dL (05-25 @ 03:00)  158 mg/dL (05-25 @ 03:14)  140 mg/dL (05-25 @ 04:09)  128 mg/dL (05-25 @ 05:13)  166 mg/dL (05-25 @ 06:14) - Insulin 5 units/hr.  160 mg/dL (05-25 @ 06:16)  109 mg/dL (05-25 @ 06:59) - Insulin 3  units/hr  102 mg/dL (05-25 @ 08:01)  94 mg/dL (05-25 @ 09:05)  78 mg/dL (05-25 @ 09:57)  80 mg/dL (05-25 @ 10:56)  119 mg/dL (05-25 @ noon) NPH 14 and lispro 3  179 mg/dL (05-25 @ dinner)      REVIEW OF SYSTEMS  Constitutional:  Negative fever, chills or loss of appetite.  Eyes:  Negative blurry vision or double vision.  Cardiovascular:  Negative for chest pain or palpitations.  Respiratory:  Negative for cough, wheezing, or shortness of breath.    Gastrointestinal:  Negative for nausea, vomiting, diarrhea, constipation, or abdominal pain.  Genitourinary:  Negative frequency, urgency or dysuria.  Neurologic:  No headache, confusion, dizziness, lightheadedness.    PHYSICAL EXAM  Vital Signs Last 24 Hrs  T(C): 36.8 (25 May 2023 10:22), Max: 36.8 (25 May 2023 10:22)  T(F): 98.2 (25 May 2023 10:22), Max: 98.2 (25 May 2023 10:22)  HR: 71 (25 May 2023 10:00) (46 - 78)  BP: 145/68 (24 May 2023 12:37) (145/68 - 145/68)  BP(mean): 98 (24 May 2023 12:37) (98 - 98)  RR: 16 (25 May 2023 10:00) (12 - 18)  SpO2: 99% (25 May 2023 10:00) (97% - 100%)    Parameters below as of 25 May 2023 11:00  Patient On (Oxygen Delivery Method): nasal cannula w/ humidification    Constitutional: Awake, alert, in no acute distress.   HEENT: Normocephalic, atraumatic, MARSHALL.  Respiratory: Lungs clear to ausculation bilaterally. + chest tube  Cardiovascular: regular rhythm, normal S1 and S2, no audible murmurs. + sternotomy  GI: soft, non-tender, non-distended, bowel sounds present.  Extremities: No lower extremity edema.  Psychiatric: AAO x 3. Normal affect/mood.     LABS  CBC - WBC/HGB/HTC/PLT: 10.27/10.5/32.0/163 (05-25-23)  BMP - Na/K/Cl/Bicarb/BUN/Cr/Gluc/AG/eGFR: 141/3.8/108/22/13/0.98/173/11/81 (05-25-23)  Ca - 8.6 (05-25-23)  Phos - 2.4 (05-25-23)  Mg - 1.7 (05-25-23)  LFT - Alb/Tprot/Tbili/Dbili/AlkPhos/ALT/AST: 4.5/--/0.6/--/45/26/26 (05-25-23)  PT/aPTT/INR: 13.3/36.1/1.12 (05-25-23)   Thyroid Stimulating Hormone, Serum: 1.200 (05-20-23)      MEDICATIONS  MEDICATIONS  (STANDING):  albumin human  5% IVPB 750 milliLiter(s) IV Intermittent once  albumin human  5% IVPB 250 milliLiter(s) IV Intermittent every 1 hour  aspirin enteric coated 81 milliGRAM(s) Oral daily  atorvastatin 40 milliGRAM(s) Oral at bedtime  ceFAZolin   IVPB 2000 milliGRAM(s) IV Intermittent every 8 hours  chlorhexidine 2% Cloths 1 Application(s) Topical daily  clopidogrel Tablet 75 milliGRAM(s) Oral daily  dextrose 5%. 1000 milliLiter(s) (50 mL/Hr) IV Continuous <Continuous>  dextrose 5%. 1000 milliLiter(s) (100 mL/Hr) IV Continuous <Continuous>  dextrose 50% Injectable 50 milliLiter(s) IV Push every 15 minutes  dextrose 50% Injectable 25 milliLiter(s) IV Push every 15 minutes  glucagon  Injectable 1 milliGRAM(s) IntraMuscular once  heparin   Injectable 5000 Unit(s) SubCutaneous every 8 hours  insulin lispro (ADMELOG) corrective regimen sliding scale   SubCutaneous Before meals and at bedtime  insulin lispro Injectable (ADMELOG) 3 Unit(s) SubCutaneous three times a day before meals  insulin NPH human recombinant 14 Unit(s) SubCutaneous once  insulin regular Infusion 1 Unit(s)/Hr (1 mL/Hr) IV Continuous <Continuous>  pantoprazole    Tablet 40 milliGRAM(s) Oral daily  phenylephrine    Infusion 0.3 MICROgram(s)/kG/Min (7.81 mL/Hr) IV Continuous <Continuous>  polyethylene glycol 3350 17 Gram(s) Oral daily  senna 2 Tablet(s) Oral at bedtime  sodium chloride 0.9%. 1000 milliLiter(s) (10 mL/Hr) IV Continuous <Continuous>    MEDICATIONS  (PRN):  acetaminophen     Tablet .. 650 milliGRAM(s) Oral every 6 hours PRN Mild Pain (1 - 3)  dextrose Oral Gel 15 Gram(s) Oral once PRN Blood Glucose LESS THAN 70 milliGRAM(s)/deciliter  oxyCODONE    IR 10 milliGRAM(s) Oral every 6 hours PRN Severe Pain (7 - 10)  oxyCODONE    IR 5 milliGRAM(s) Oral every 6 hours PRN Moderate Pain (4 - 6)

## 2023-05-25 NOTE — PROGRESS NOTE ADULT - ASSESSMENT
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 He was r/i NSTEMI and underwent cardiac cath revealing mv CAD. He was started on a heparin gtt and transferred to [facility] under Dr Martinez now s/p CABG on 5/24/2023. Also with uncontrolled T2DM with hyperglycemia.     A1C: 10.2 %  BUN: 13  Creatinine: 0.98  GFR: 81  Weight: 69.4  BMI: 26.2  EF: 55-60%

## 2023-05-25 NOTE — PROGRESS NOTE ADULT - SUBJECTIVE AND OBJECTIVE BOX
INTERVAL COURSE  POD#1  OPCAB x 3  EF 55%  Post op Afib RVR receiving Amio- hemodynamically stable  Ambulated and diuresed     VITALS  Vital Signs Last 24 Hrs  T(C): 36.8 (26 May 2023 01:43), Max: 37.2 (25 May 2023 22:49)  T(F): 98.2 (26 May 2023 01:43), Max: 98.9 (25 May 2023 22:49)  HR: 114 (26 May 2023 05:00) (66 - 155)  BP: 107/58 (25 May 2023 23:00) (107/58 - 120/58)  BP(mean): 77 (25 May 2023 23:00) (77 - 84)  RR: 16 (26 May 2023 05:00) (16 - 20)  SpO2: 92% (26 May 2023 05:00) (91% - 100%)    Parameters below as of 26 May 2023 05:00  Patient On (Oxygen Delivery Method): nasal cannula w/ humidification  O2 Flow (L/min): 2      I&O's Summary    24 May 2023 07:01  -  25 May 2023 07:00  --------------------------------------------------------  IN: 2276.4 mL / OUT: 3315 mL / NET: -1038.6 mL    25 May 2023 07:01  -  26 May 2023 05:26  --------------------------------------------------------  IN: 2155.4 mL / OUT: 1660 mL / NET: 495.4 mL    PHYSICAL EXAM  General: resting, NAD   Respiratory: basilar crackles +  Cardiovascular: Afib rate 120s   Gastrointestinal: Soft; NTND   Extremities: WWP; no edema   Neurological:  CNII-XII grossly intact; no obvious focal deficits    MEDICATIONS  (STANDING):  albumin human  5% IVPB 750 milliLiter(s) IV Intermittent once  aMIOdarone    Tablet 400 milliGRAM(s) Oral every 8 hours  aMIOdarone    Tablet   Oral   aspirin enteric coated 81 milliGRAM(s) Oral daily  atorvastatin 40 milliGRAM(s) Oral at bedtime  ceFAZolin   IVPB 2000 milliGRAM(s) IV Intermittent every 8 hours  chlorhexidine 2% Cloths 1 Application(s) Topical daily  clopidogrel Tablet 75 milliGRAM(s) Oral daily  dextrose 5%. 1000 milliLiter(s) (50 mL/Hr) IV Continuous <Continuous>  dextrose 5%. 1000 milliLiter(s) (100 mL/Hr) IV Continuous <Continuous>  dextrose 50% Injectable 50 milliLiter(s) IV Push every 15 minutes  dextrose 50% Injectable 25 milliLiter(s) IV Push every 15 minutes  furosemide   Injectable 20 milliGRAM(s) IV Push once  glucagon  Injectable 1 milliGRAM(s) IntraMuscular once  heparin   Injectable 5000 Unit(s) SubCutaneous every 8 hours  insulin glargine Injectable (LANTUS) 24 Unit(s) SubCutaneous at bedtime  insulin lispro (ADMELOG) corrective regimen sliding scale   SubCutaneous Before meals and at bedtime  insulin lispro Injectable (ADMELOG) 3 Unit(s) SubCutaneous three times a day before meals  metoprolol tartrate 12.5 milliGRAM(s) Oral every 6 hours  pantoprazole    Tablet 40 milliGRAM(s) Oral daily  polyethylene glycol 3350 17 Gram(s) Oral daily  senna 2 Tablet(s) Oral at bedtime  sodium chloride 0.9%. 1000 milliLiter(s) (10 mL/Hr) IV Continuous <Continuous>    MEDICATIONS  (PRN):  acetaminophen     Tablet .. 650 milliGRAM(s) Oral every 6 hours PRN Mild Pain (1 - 3)  dextrose Oral Gel 15 Gram(s) Oral once PRN Blood Glucose LESS THAN 70 milliGRAM(s)/deciliter  oxyCODONE    IR 10 milliGRAM(s) Oral every 6 hours PRN Severe Pain (7 - 10)  oxyCODONE    IR 5 milliGRAM(s) Oral every 6 hours PRN Moderate Pain (4 - 6)      LABS                        9.1    9.96  )-----------( 146      ( 26 May 2023 04:01 )             27.8     05-26    140  |  108  |  16  ----------------------------<  291<H>  4.1   |  23  |  1.25    Ca    8.5      26 May 2023 04:01  Phos  2.2     05-26  Mg     2.6     05-26    TPro  6.2  /  Alb  4.5  /  TBili  0.3  /  DBili  x   /  AST  20  /  ALT  11  /  AlkPhos  43  05-26    LIVER FUNCTIONS - ( 26 May 2023 04:01 )  Alb: 4.5 g/dL / Pro: 6.2 g/dL / ALK PHOS: 43 U/L / ALT: 11 U/L / AST: 20 U/L / GGT: x           PT/INR - ( 26 May 2023 04:01 )   PT: 12.9 sec;   INR: 1.08          PTT - ( 26 May 2023 04:01 )  PTT:37.4 sec      IMAGING/EKG/ETC  Reviewed.

## 2023-05-25 NOTE — PROGRESS NOTE ADULT - PROBLEM SELECTOR PLAN 1
Type 2 diabetes mellitus with hyperglycemia  - Please decrease lantus to  units at bedtime if NPO at MN. If not, give lantus 25 units.  - Continue lispro  units before each meal.  - Continue lispro moderate dose sliding scale before meals and at bedtime.  - Patient's fingerstick glucose goal is 100-180 mg/dL.    - For discharge, patient can ***.    - Patient can follow up at discharge with [facility] by calling [phone] to make an appointment.      Case discussed with Dr. Kennedy. Primary team updated. Type 2 diabetes mellitus with hyperglycemia  - Please give lantus 24  units at bedtime.  - Continue lispro 3 units before each meal.  - Continue lispro moderate dose sliding scale before meals and at bedtime.  - Patient's fingerstick glucose goal is 100-180 mg/dL.    - For discharge, patient can ***.    - Patient can follow up at discharge with [facility] by calling [phone] to make an appointment.      Case discussed with Dr. Kennedy. Primary team updated.

## 2023-05-25 NOTE — PROGRESS NOTE ADULT - ASSESSMENT
65  M former smoker w/PMHx of HTN, T2 DM, CVA (2106) no residual deficits and Alzheimers presented to  with CP at rest r/i NSTEMI and underwent cardiac cath revealing mv CAD. He was started on a heparin gtt and transferred to [facility] for pre-op CABG workup.   S/p  OPCABx 3 (LIMA-LAD SVG-OM-PDA) EF 55%   5/24  extubated in short course  A/p  Postop Afib RVR s/p 3 amio boluses with transition to PO load, BB titrated up, lytes optimized   CXR wet with positive FB starting standing lasix for Neg 1-2 L FB and weaning supp O2   Blakes with minimal output overnight-- can deescalate in am  Uncontrolled T2DM A1c> 10%--insulin gtt transitioned to basal bolus, premeal and SSC still hyperglycemic -- endo recs appreciated   Continue DAPt/STATin and ICU ppx   OOB and mobilizing as tolerated   Fariha and earline teague in am     ATTENDING: I have personally and independently provided 30 min of critical care services

## 2023-05-25 NOTE — PROGRESS NOTE ADULT - NS ATTEND AMEND GEN_ALL_CORE FT
Pt seen on rounds this afternoon.  Underwent CABG yesterday.  Required insulin drip post-op, max dose 5 U/hr.  Drip was D/C'd at 10 AM, and he was given a bridge dose of 14 units NPH at approximately noon, when his FS was 119 mg%  Was also started on 3 units lispro premeal  PO intake has been very limited.  Lungs difficult to evaluated due to poor inspiratory effort  Extremities with only trace edema  --With 5 PM fingerstick higher at 179 despite poor PO intake at lunch, will restart the Lantus at 24 units tonight  --Continue the lispro at only 3 units premeal given his poor PO intake
10

## 2023-05-25 NOTE — PROGRESS NOTE ADULT - SUBJECTIVE AND OBJECTIVE BOX
CTICU  CRITICAL  CARE  attending     Hand off received 					   Pertinent clinical, laboratory, radiographic, hemodynamic, echocardiographic, respiratory data, microbiologic data and chart were reviewed and analyzed frequently throughout the course of the day and night  Patient seen and examined with CTS/ SH attending at bedside  Pt is a 72y , Male, HEALTH ISSUES - PROBLEM Dx:  Diabetes    CAD (coronary artery disease)        , FAMILY HISTORY:  PAST MEDICAL & SURGICAL HISTORY:  CVA (cerebrovascular accident)      HTN (hypertension)      HLD (hyperlipidemia)      Diabetes      AD (Alzheimer's disease)        Patient is a 72y old  Male who presents with a chief complaint of CAD (25 May 2023 11:26)      14 system review was unremarkable    Vital signs, hemodynamic and respiratory parameters were reviewed from the bedside nursing flowsheet.  ICU Vital Signs Last 24 Hrs  T(C): 37.2 (25 May 2023 22:49), Max: 37.2 (25 May 2023 22:49)  T(F): 98.9 (25 May 2023 22:49), Max: 98.9 (25 May 2023 22:49)  HR: 95 (25 May 2023 22:00) (48 - 111)  BP: 120/58 (25 May 2023 18:00) (120/58 - 120/58)  BP(mean): 84 (25 May 2023 18:00) (84 - 84)  ABP: 132/48 (25 May 2023 22:00) (100/43 - 159/49)  ABP(mean): 75 (25 May 2023 22:00) (62 - 86)  RR: 16 (25 May 2023 22:00) (12 - 20)  SpO2: 97% (25 May 2023 22:00) (91% - 100%)    O2 Parameters below as of 25 May 2023 22:00  Patient On (Oxygen Delivery Method): nasal cannula w/ humidification  O2 Flow (L/min): 2        Adult Advanced Hemodynamics Last 24 Hrs  CVP(mm Hg): 10 (25 May 2023 22:00) (2 - 16)  CVP(cm H2O): --  CO: --  CI: --  PA: --  PA(mean): --  PCWP: --  SVR: --  SVRI: --  PVR: --  PVRI: --, ABG - ( 25 May 2023 23:12 )  pH, Arterial: 7.41  pH, Blood: x     /  pCO2: 34    /  pO2: 72    / HCO3: 22    / Base Excess: -2.4  /  SaO2: 95.8              Mode: standby    Intake and output was reviewed and the fluid balance was calculated  Daily     Daily   I&O's Summary    24 May 2023 07:01  -  25 May 2023 07:00  --------------------------------------------------------  IN: 2276.4 mL / OUT: 3315 mL / NET: -1038.6 mL    25 May 2023 07:01  -  25 May 2023 23:49  --------------------------------------------------------  IN: 1645.4 mL / OUT: 1180 mL / NET: 465.4 mL        All lines and drain sites were assessed  [facility]      POCT Blood Glucose.: 261 mg/dL (25 May 2023 21:31)    No acute change in mental status  Auscultation of the chest reveals equal bs  Abdomen is soft  Extremities are warm and well perfused  Wounds appear clean and unremarkable  Antibiotics are periop    labs  CBC Full  -  ( 25 May 2023 23:21 )  WBC Count : 9.56 K/uL  RBC Count : 3.03 M/uL  Hemoglobin : 8.8 g/dL  Hematocrit : 27.1 %  Platelet Count - Automated : 145 K/uL  Mean Cell Volume : 89.4 fl  Mean Cell Hemoglobin : 29.0 pg  Mean Cell Hemoglobin Concentration : 32.5 gm/dL  Auto Neutrophil # : 7.22 K/uL  Auto Lymphocyte # : 1.45 K/uL  Auto Monocyte # : 0.78 K/uL  Auto Eosinophil # : 0.03 K/uL  Auto Basophil # : 0.05 K/uL  Auto Neutrophil % : 75.5 %  Auto Lymphocyte % : 15.2 %  Auto Monocyte % : 8.2 %  Auto Eosinophil % : 0.3 %  Auto Basophil % : 0.5 %    05-25    140  |  107  |  14  ----------------------------<  x   4.0   |  23  |  1.30    Ca    8.5      25 May 2023 23:21  Phos  2.6     05-25  Mg     2.0     05-25    TPro  6.1  /  Alb  4.6  /  TBili  0.4  /  DBili  x   /  AST  19  /  ALT  15  /  AlkPhos  36<L>  05-25    PT/INR - ( 25 May 2023 23:21 )   PT: 13.6 sec;   INR: 1.14          PTT - ( 25 May 2023 23:21 )  PTT:38.6 sec  The current medications were reviewed   MEDICATIONS  (STANDING):  albumin human  5% IVPB 750 milliLiter(s) IV Intermittent once  aMIOdarone IVPB 150 milliGRAM(s) IV Intermittent once  aspirin enteric coated 81 milliGRAM(s) Oral daily  atorvastatin 40 milliGRAM(s) Oral at bedtime  ceFAZolin   IVPB 2000 milliGRAM(s) IV Intermittent every 8 hours  chlorhexidine 2% Cloths 1 Application(s) Topical daily  clopidogrel Tablet 75 milliGRAM(s) Oral daily  dextrose 5%. 1000 milliLiter(s) (50 mL/Hr) IV Continuous <Continuous>  dextrose 5%. 1000 milliLiter(s) (100 mL/Hr) IV Continuous <Continuous>  dextrose 50% Injectable 50 milliLiter(s) IV Push every 15 minutes  dextrose 50% Injectable 25 milliLiter(s) IV Push every 15 minutes  glucagon  Injectable 1 milliGRAM(s) IntraMuscular once  heparin   Injectable 5000 Unit(s) SubCutaneous every 8 hours  insulin glargine Injectable (LANTUS) 24 Unit(s) SubCutaneous at bedtime  insulin lispro (ADMELOG) corrective regimen sliding scale   SubCutaneous Before meals and at bedtime  insulin lispro Injectable (ADMELOG) 3 Unit(s) SubCutaneous three times a day before meals  metoprolol tartrate 12.5 milliGRAM(s) Oral every 6 hours  pantoprazole    Tablet 40 milliGRAM(s) Oral daily  polyethylene glycol 3350 17 Gram(s) Oral daily  potassium chloride    Tablet ER 40 milliEquivalent(s) Oral once  senna 2 Tablet(s) Oral at bedtime  sodium chloride 0.9%. 1000 milliLiter(s) (10 mL/Hr) IV Continuous <Continuous>    MEDICATIONS  (PRN):  acetaminophen     Tablet .. 650 milliGRAM(s) Oral every 6 hours PRN Mild Pain (1 - 3)  dextrose Oral Gel 15 Gram(s) Oral once PRN Blood Glucose LESS THAN 70 milliGRAM(s)/deciliter  oxyCODONE    IR 10 milliGRAM(s) Oral every 6 hours PRN Severe Pain (7 - 10)  oxyCODONE    IR 5 milliGRAM(s) Oral every 6 hours PRN Moderate Pain (4 - 6)       PROBLEM LIST/ ASSESSMENT:  HEALTH ISSUES - PROBLEM Dx:  Diabetes    CAD (coronary artery disease)        ,   Patient is a 72y old  Male who presents with a chief complaint of CAD (25 May 2023 11:26)     s/p cardiac surgery    Hypovolemic shock - > 20% intravascular depletion will replete volume    Acidosis evidenced by anion gap and negative base excess          My plan includes :    volume volume     wean off jose alberto   close hemodynamic, ventilatory and drain monitoring and management per post op routine    Monitor for arrhythmias and monitor parameters for organ perfusion  beta blockade not administered due to hemodynamic instability and bradycardia  monitor neurologic status  Head of the bed should remain elevated to 45 deg .   chest PT and IS will be encouraged  monitor adequacy of oxygenation and ventilation and attempt to wean oxygen  antibiotic regimen will be tailored to the clinical, laboratory and microbiologic data  Nutritional goals will be met using po eventually , ensure adequate caloric intake and montior the same  Stress ulcer and VTE prophylaxis will be achieved    Glycemic control is satisfactory  Electrolytes have been repleted as necessary and wound care has been carried out. Pain control has been achieved.   agressive physical therapy and early mobility and ambulation goals will be met   The family was updated about the course and plan  CRITICAL CARE TIME Upon my evaluation, this patient had a high probability of imminent or life-threatening deterioration due to the above problems which required my direct attention, intervention, and personal management.  I have personally provided 110 minutes of critical care time exclusive of time spent on separately billable procedures. Time included review of laboratory data, radiology results, discussion with consultants, and monitoring for potential decompensation. Interventions were performed as documented abovepersonally provided by me  in evaluation and management, reassessments, review and interpretation of labs and x-rays, ventilator and hemodynamic management, formulating a plan and coordinating care: ___110___ MIN.  Time does not include procedural time.    CTICU ATTENDING     					    Tom Cleaning MD

## 2023-05-26 LAB
ALBUMIN SERPL ELPH-MCNC: 4.5 G/DL — SIGNIFICANT CHANGE UP (ref 3.3–5)
ALP SERPL-CCNC: 43 U/L — SIGNIFICANT CHANGE UP (ref 40–120)
ALT FLD-CCNC: 11 U/L — SIGNIFICANT CHANGE UP (ref 10–45)
ANION GAP SERPL CALC-SCNC: 9 MMOL/L — SIGNIFICANT CHANGE UP (ref 5–17)
ANION GAP SERPL CALC-SCNC: 9 MMOL/L — SIGNIFICANT CHANGE UP (ref 5–17)
APTT BLD: 37.4 SEC — HIGH (ref 27.5–35.5)
AST SERPL-CCNC: 20 U/L — SIGNIFICANT CHANGE UP (ref 10–40)
BASOPHILS # BLD AUTO: 0.05 K/UL — SIGNIFICANT CHANGE UP (ref 0–0.2)
BASOPHILS NFR BLD AUTO: 0.5 % — SIGNIFICANT CHANGE UP (ref 0–2)
BILIRUB SERPL-MCNC: 0.3 MG/DL — SIGNIFICANT CHANGE UP (ref 0.2–1.2)
BUN SERPL-MCNC: 16 MG/DL — SIGNIFICANT CHANGE UP (ref 7–23)
BUN SERPL-MCNC: 17 MG/DL — SIGNIFICANT CHANGE UP (ref 7–23)
CALCIUM SERPL-MCNC: 8.4 MG/DL — SIGNIFICANT CHANGE UP (ref 8.4–10.5)
CALCIUM SERPL-MCNC: 8.5 MG/DL — SIGNIFICANT CHANGE UP (ref 8.4–10.5)
CHLORIDE SERPL-SCNC: 106 MMOL/L — SIGNIFICANT CHANGE UP (ref 96–108)
CHLORIDE SERPL-SCNC: 108 MMOL/L — SIGNIFICANT CHANGE UP (ref 96–108)
CO2 SERPL-SCNC: 23 MMOL/L — SIGNIFICANT CHANGE UP (ref 22–31)
CO2 SERPL-SCNC: 23 MMOL/L — SIGNIFICANT CHANGE UP (ref 22–31)
CREAT SERPL-MCNC: 1.25 MG/DL — SIGNIFICANT CHANGE UP (ref 0.5–1.3)
CREAT SERPL-MCNC: 1.29 MG/DL — SIGNIFICANT CHANGE UP (ref 0.5–1.3)
EGFR: 59 ML/MIN/1.73M2 — LOW
EGFR: 61 ML/MIN/1.73M2 — SIGNIFICANT CHANGE UP
EOSINOPHIL # BLD AUTO: 0.04 K/UL — SIGNIFICANT CHANGE UP (ref 0–0.5)
EOSINOPHIL NFR BLD AUTO: 0.4 % — SIGNIFICANT CHANGE UP (ref 0–6)
GAS PNL BLDA: SIGNIFICANT CHANGE UP
GLUCOSE BLDC GLUCOMTR-MCNC: 202 MG/DL — HIGH (ref 70–99)
GLUCOSE BLDC GLUCOMTR-MCNC: 220 MG/DL — HIGH (ref 70–99)
GLUCOSE BLDC GLUCOMTR-MCNC: 225 MG/DL — HIGH (ref 70–99)
GLUCOSE BLDC GLUCOMTR-MCNC: 319 MG/DL — HIGH (ref 70–99)
GLUCOSE SERPL-MCNC: 287 MG/DL — HIGH (ref 70–99)
GLUCOSE SERPL-MCNC: 291 MG/DL — HIGH (ref 70–99)
HCT VFR BLD CALC: 27.8 % — LOW (ref 39–50)
HGB BLD-MCNC: 9.1 G/DL — LOW (ref 13–17)
IMM GRANULOCYTES NFR BLD AUTO: 0.3 % — SIGNIFICANT CHANGE UP (ref 0–0.9)
INR BLD: 1.08 — SIGNIFICANT CHANGE UP (ref 0.88–1.16)
LYMPHOCYTES # BLD AUTO: 1.39 K/UL — SIGNIFICANT CHANGE UP (ref 1–3.3)
LYMPHOCYTES # BLD AUTO: 14 % — SIGNIFICANT CHANGE UP (ref 13–44)
MAGNESIUM SERPL-MCNC: 2.4 MG/DL — SIGNIFICANT CHANGE UP (ref 1.6–2.6)
MAGNESIUM SERPL-MCNC: 2.6 MG/DL — SIGNIFICANT CHANGE UP (ref 1.6–2.6)
MCHC RBC-ENTMCNC: 29.4 PG — SIGNIFICANT CHANGE UP (ref 27–34)
MCHC RBC-ENTMCNC: 32.7 GM/DL — SIGNIFICANT CHANGE UP (ref 32–36)
MCV RBC AUTO: 89.7 FL — SIGNIFICANT CHANGE UP (ref 80–100)
MONOCYTES # BLD AUTO: 0.84 K/UL — SIGNIFICANT CHANGE UP (ref 0–0.9)
MONOCYTES NFR BLD AUTO: 8.4 % — SIGNIFICANT CHANGE UP (ref 2–14)
NEUTROPHILS # BLD AUTO: 7.61 K/UL — HIGH (ref 1.8–7.4)
NEUTROPHILS NFR BLD AUTO: 76.4 % — SIGNIFICANT CHANGE UP (ref 43–77)
NRBC # BLD: 0 /100 WBCS — SIGNIFICANT CHANGE UP (ref 0–0)
PHOSPHATE SERPL-MCNC: 2.2 MG/DL — LOW (ref 2.5–4.5)
PLATELET # BLD AUTO: 146 K/UL — LOW (ref 150–400)
POTASSIUM SERPL-MCNC: 4 MMOL/L — SIGNIFICANT CHANGE UP (ref 3.5–5.3)
POTASSIUM SERPL-MCNC: 4.1 MMOL/L — SIGNIFICANT CHANGE UP (ref 3.5–5.3)
POTASSIUM SERPL-SCNC: 4 MMOL/L — SIGNIFICANT CHANGE UP (ref 3.5–5.3)
POTASSIUM SERPL-SCNC: 4.1 MMOL/L — SIGNIFICANT CHANGE UP (ref 3.5–5.3)
PROT SERPL-MCNC: 6.2 G/DL — SIGNIFICANT CHANGE UP (ref 6–8.3)
PROTHROM AB SERPL-ACNC: 12.9 SEC — SIGNIFICANT CHANGE UP (ref 10.5–13.4)
RBC # BLD: 3.1 M/UL — LOW (ref 4.2–5.8)
RBC # FLD: 14.2 % — SIGNIFICANT CHANGE UP (ref 10.3–14.5)
SODIUM SERPL-SCNC: 138 MMOL/L — SIGNIFICANT CHANGE UP (ref 135–145)
SODIUM SERPL-SCNC: 140 MMOL/L — SIGNIFICANT CHANGE UP (ref 135–145)
WBC # BLD: 9.96 K/UL — SIGNIFICANT CHANGE UP (ref 3.8–10.5)
WBC # FLD AUTO: 9.96 K/UL — SIGNIFICANT CHANGE UP (ref 3.8–10.5)

## 2023-05-26 PROCEDURE — 71045 X-RAY EXAM CHEST 1 VIEW: CPT | Mod: 26

## 2023-05-26 PROCEDURE — 99232 SBSQ HOSP IP/OBS MODERATE 35: CPT | Mod: GC

## 2023-05-26 RX ORDER — AMIODARONE HYDROCHLORIDE 400 MG/1
TABLET ORAL
Refills: 0 | Status: DISCONTINUED | OUTPATIENT
Start: 2023-05-26 | End: 2023-06-02

## 2023-05-26 RX ORDER — POTASSIUM CHLORIDE 20 MEQ
20 PACKET (EA) ORAL ONCE
Refills: 0 | Status: COMPLETED | OUTPATIENT
Start: 2023-05-26 | End: 2023-05-26

## 2023-05-26 RX ORDER — METOPROLOL TARTRATE 50 MG
2.5 TABLET ORAL ONCE
Refills: 0 | Status: COMPLETED | OUTPATIENT
Start: 2023-05-26 | End: 2023-05-26

## 2023-05-26 RX ORDER — INSULIN LISPRO 100/ML
7 VIAL (ML) SUBCUTANEOUS
Refills: 0 | Status: DISCONTINUED | OUTPATIENT
Start: 2023-05-26 | End: 2023-05-30

## 2023-05-26 RX ORDER — AMIODARONE HYDROCHLORIDE 400 MG/1
400 TABLET ORAL EVERY 8 HOURS
Refills: 0 | Status: COMPLETED | OUTPATIENT
Start: 2023-05-26 | End: 2023-05-29

## 2023-05-26 RX ORDER — AMIODARONE HYDROCHLORIDE 400 MG/1
150 TABLET ORAL ONCE
Refills: 0 | Status: COMPLETED | OUTPATIENT
Start: 2023-05-26 | End: 2023-05-26

## 2023-05-26 RX ORDER — INSULIN GLARGINE 100 [IU]/ML
38 INJECTION, SOLUTION SUBCUTANEOUS AT BEDTIME
Refills: 0 | Status: DISCONTINUED | OUTPATIENT
Start: 2023-05-26 | End: 2023-06-01

## 2023-05-26 RX ORDER — INSULIN GLARGINE 100 [IU]/ML
28 INJECTION, SOLUTION SUBCUTANEOUS AT BEDTIME
Refills: 0 | Status: DISCONTINUED | OUTPATIENT
Start: 2023-05-26 | End: 2023-05-26

## 2023-05-26 RX ORDER — FUROSEMIDE 40 MG
20 TABLET ORAL ONCE
Refills: 0 | Status: COMPLETED | OUTPATIENT
Start: 2023-05-26 | End: 2023-05-26

## 2023-05-26 RX ORDER — HUMAN INSULIN 100 [IU]/ML
12 INJECTION, SUSPENSION SUBCUTANEOUS ONCE
Refills: 0 | Status: COMPLETED | OUTPATIENT
Start: 2023-05-26 | End: 2023-05-26

## 2023-05-26 RX ORDER — SODIUM CHLORIDE 9 MG/ML
3 INJECTION INTRAMUSCULAR; INTRAVENOUS; SUBCUTANEOUS EVERY 8 HOURS
Refills: 0 | Status: DISCONTINUED | OUTPATIENT
Start: 2023-05-26 | End: 2023-06-02

## 2023-05-26 RX ORDER — MAGNESIUM SULFATE 500 MG/ML
2 VIAL (ML) INJECTION ONCE
Refills: 0 | Status: COMPLETED | OUTPATIENT
Start: 2023-05-26 | End: 2023-05-26

## 2023-05-26 RX ORDER — INSULIN LISPRO 100/ML
5 VIAL (ML) SUBCUTANEOUS
Refills: 0 | Status: DISCONTINUED | OUTPATIENT
Start: 2023-05-26 | End: 2023-05-26

## 2023-05-26 RX ORDER — ALBUMIN HUMAN 25 %
250 VIAL (ML) INTRAVENOUS ONCE
Refills: 0 | Status: COMPLETED | OUTPATIENT
Start: 2023-05-26 | End: 2023-05-26

## 2023-05-26 RX ORDER — AMIODARONE HYDROCHLORIDE 400 MG/1
200 TABLET ORAL DAILY
Refills: 0 | Status: DISCONTINUED | OUTPATIENT
Start: 2023-05-29 | End: 2023-06-02

## 2023-05-26 RX ADMIN — HEPARIN SODIUM 5000 UNIT(S): 5000 INJECTION INTRAVENOUS; SUBCUTANEOUS at 06:16

## 2023-05-26 RX ADMIN — HEPARIN SODIUM 5000 UNIT(S): 5000 INJECTION INTRAVENOUS; SUBCUTANEOUS at 21:59

## 2023-05-26 RX ADMIN — AMIODARONE HYDROCHLORIDE 600 MILLIGRAM(S): 400 TABLET ORAL at 08:07

## 2023-05-26 RX ADMIN — HEPARIN SODIUM 5000 UNIT(S): 5000 INJECTION INTRAVENOUS; SUBCUTANEOUS at 13:49

## 2023-05-26 RX ADMIN — CHLORHEXIDINE GLUCONATE 1 APPLICATION(S): 213 SOLUTION TOPICAL at 16:46

## 2023-05-26 RX ADMIN — OXYCODONE HYDROCHLORIDE 10 MILLIGRAM(S): 5 TABLET ORAL at 11:30

## 2023-05-26 RX ADMIN — SENNA PLUS 2 TABLET(S): 8.6 TABLET ORAL at 21:59

## 2023-05-26 RX ADMIN — Medication 12.5 MILLIGRAM(S): at 17:39

## 2023-05-26 RX ADMIN — Medication 2.5 MILLIGRAM(S): at 09:03

## 2023-05-26 RX ADMIN — Medication 20 MILLIGRAM(S): at 06:17

## 2023-05-26 RX ADMIN — SODIUM CHLORIDE 3 MILLILITER(S): 9 INJECTION INTRAMUSCULAR; INTRAVENOUS; SUBCUTANEOUS at 14:30

## 2023-05-26 RX ADMIN — Medication 81 MILLIGRAM(S): at 11:10

## 2023-05-26 RX ADMIN — AMIODARONE HYDROCHLORIDE 400 MILLIGRAM(S): 400 TABLET ORAL at 04:14

## 2023-05-26 RX ADMIN — AMIODARONE HYDROCHLORIDE 400 MILLIGRAM(S): 400 TABLET ORAL at 13:49

## 2023-05-26 RX ADMIN — Medication 100 MILLIGRAM(S): at 06:17

## 2023-05-26 RX ADMIN — OXYCODONE HYDROCHLORIDE 10 MILLIGRAM(S): 5 TABLET ORAL at 04:45

## 2023-05-26 RX ADMIN — Medication 4: at 21:59

## 2023-05-26 RX ADMIN — POLYETHYLENE GLYCOL 3350 17 GRAM(S): 17 POWDER, FOR SOLUTION ORAL at 11:11

## 2023-05-26 RX ADMIN — PANTOPRAZOLE SODIUM 40 MILLIGRAM(S): 20 TABLET, DELAYED RELEASE ORAL at 11:10

## 2023-05-26 RX ADMIN — Medication 4: at 16:44

## 2023-05-26 RX ADMIN — Medication 20 MILLIEQUIVALENT(S): at 10:21

## 2023-05-26 RX ADMIN — Medication 125 MILLILITER(S): at 10:21

## 2023-05-26 RX ADMIN — Medication 8: at 07:54

## 2023-05-26 RX ADMIN — OXYCODONE HYDROCHLORIDE 10 MILLIGRAM(S): 5 TABLET ORAL at 03:43

## 2023-05-26 RX ADMIN — AMIODARONE HYDROCHLORIDE 400 MILLIGRAM(S): 400 TABLET ORAL at 21:59

## 2023-05-26 RX ADMIN — CLOPIDOGREL BISULFATE 75 MILLIGRAM(S): 75 TABLET, FILM COATED ORAL at 11:10

## 2023-05-26 RX ADMIN — Medication 4: at 11:19

## 2023-05-26 RX ADMIN — HUMAN INSULIN 12 UNIT(S): 100 INJECTION, SUSPENSION SUBCUTANEOUS at 11:17

## 2023-05-26 RX ADMIN — OXYCODONE HYDROCHLORIDE 5 MILLIGRAM(S): 5 TABLET ORAL at 19:49

## 2023-05-26 RX ADMIN — ATORVASTATIN CALCIUM 40 MILLIGRAM(S): 80 TABLET, FILM COATED ORAL at 21:59

## 2023-05-26 RX ADMIN — Medication 5 UNIT(S): at 16:45

## 2023-05-26 RX ADMIN — OXYCODONE HYDROCHLORIDE 5 MILLIGRAM(S): 5 TABLET ORAL at 21:00

## 2023-05-26 RX ADMIN — AMIODARONE HYDROCHLORIDE 133.33 MILLIGRAM(S): 400 TABLET ORAL at 03:43

## 2023-05-26 RX ADMIN — Medication 12.5 MILLIGRAM(S): at 06:17

## 2023-05-26 RX ADMIN — Medication 25 GRAM(S): at 00:37

## 2023-05-26 RX ADMIN — Medication 3 UNIT(S): at 07:54

## 2023-05-26 RX ADMIN — Medication 5 UNIT(S): at 11:44

## 2023-05-26 RX ADMIN — AMIODARONE HYDROCHLORIDE 400 MILLIGRAM(S): 400 TABLET ORAL at 06:17

## 2023-05-26 RX ADMIN — OXYCODONE HYDROCHLORIDE 10 MILLIGRAM(S): 5 TABLET ORAL at 10:05

## 2023-05-26 RX ADMIN — Medication 12.5 MILLIGRAM(S): at 11:10

## 2023-05-26 RX ADMIN — SODIUM CHLORIDE 3 MILLILITER(S): 9 INJECTION INTRAMUSCULAR; INTRAVENOUS; SUBCUTANEOUS at 21:42

## 2023-05-26 RX ADMIN — INSULIN GLARGINE 38 UNIT(S): 100 INJECTION, SOLUTION SUBCUTANEOUS at 22:03

## 2023-05-26 NOTE — DIETITIAN INITIAL EVALUATION ADULT - OTHER CALCULATIONS
Actual body weight used to calculate energy needs due to pt's current body weight within % ideal body weight. Adjust for hospital course, age, BMI. Fluids per team

## 2023-05-26 NOTE — DIETITIAN INITIAL EVALUATION ADULT - ADD RECOMMEND
1. Continue current diet order (conscho)  2. Add recommended ONS regimen   3. Monitor PO intake closely; honor pt food preferences as able   4. Monitor lytes, replete prn   5. Continue insulin regimen per team; FSBG q4-6hrs   6. Continue bowel/pain regimen   7. RD to remain available for recs adjustment prn or will follow up pt per organizational policy

## 2023-05-26 NOTE — DIETITIAN INITIAL EVALUATION ADULT - PERTINENT MEDS FT
MEDICATIONS  (STANDING):  albumin human  5% IVPB 750 milliLiter(s) IV Intermittent once  aMIOdarone    Tablet 400 milliGRAM(s) Oral every 8 hours  aMIOdarone    Tablet   Oral   aspirin enteric coated 81 milliGRAM(s) Oral daily  atorvastatin 40 milliGRAM(s) Oral at bedtime  chlorhexidine 2% Cloths 1 Application(s) Topical daily  clopidogrel Tablet 75 milliGRAM(s) Oral daily  dextrose 5%. 1000 milliLiter(s) (50 mL/Hr) IV Continuous <Continuous>  dextrose 5%. 1000 milliLiter(s) (100 mL/Hr) IV Continuous <Continuous>  dextrose 50% Injectable 25 milliLiter(s) IV Push every 15 minutes  dextrose 50% Injectable 50 milliLiter(s) IV Push every 15 minutes  glucagon  Injectable 1 milliGRAM(s) IntraMuscular once  heparin   Injectable 5000 Unit(s) SubCutaneous every 8 hours  insulin glargine Injectable (LANTUS) 28 Unit(s) SubCutaneous at bedtime  insulin lispro (ADMELOG) corrective regimen sliding scale   SubCutaneous Before meals and at bedtime  insulin lispro Injectable (ADMELOG) 5 Unit(s) SubCutaneous three times a day before meals  metoprolol tartrate 12.5 milliGRAM(s) Oral every 6 hours  pantoprazole    Tablet 40 milliGRAM(s) Oral daily  polyethylene glycol 3350 17 Gram(s) Oral daily  senna 2 Tablet(s) Oral at bedtime  sodium chloride 0.9% lock flush 3 milliLiter(s) IV Push every 8 hours  sodium chloride 0.9%. 1000 milliLiter(s) (10 mL/Hr) IV Continuous <Continuous>    MEDICATIONS  (PRN):  acetaminophen     Tablet .. 650 milliGRAM(s) Oral every 6 hours PRN Mild Pain (1 - 3)  dextrose Oral Gel 15 Gram(s) Oral once PRN Blood Glucose LESS THAN 70 milliGRAM(s)/deciliter  oxyCODONE    IR 10 milliGRAM(s) Oral every 6 hours PRN Severe Pain (7 - 10)  oxyCODONE    IR 5 milliGRAM(s) Oral every 6 hours PRN Moderate Pain (4 - 6)

## 2023-05-26 NOTE — PROGRESS NOTE ADULT - SUBJECTIVE AND OBJECTIVE BOX
INTERVAL HPI/OVERNIGHT EVENTS:    Patient is a 72y old  Male who presents with a chief complaint of CAD (26 May 2023 14:53)      Pt reports the following symptoms:    CONSTITUTIONAL:  Negative fever or chills, feels well, good appetite  EYES:  Negative  blurry vision or double vision  CARDIOVASCULAR:  Negative for chest pain or palpitations  RESPIRATORY:  Negative for cough, wheezing, or SOB   GASTROINTESTINAL:  Negative for nausea, vomiting, diarrhea, constipation, or abdominal pain  GENITOURINARY:  Negative frequency, urgency or dysuria  NEUROLOGIC:  No headache, confusion, dizziness, lightheadedness    MEDICATIONS  (STANDING):  albumin human  5% IVPB 750 milliLiter(s) IV Intermittent once  aMIOdarone    Tablet 400 milliGRAM(s) Oral every 8 hours  aMIOdarone    Tablet   Oral   aspirin enteric coated 81 milliGRAM(s) Oral daily  atorvastatin 40 milliGRAM(s) Oral at bedtime  chlorhexidine 2% Cloths 1 Application(s) Topical daily  clopidogrel Tablet 75 milliGRAM(s) Oral daily  dextrose 5%. 1000 milliLiter(s) (50 mL/Hr) IV Continuous <Continuous>  dextrose 5%. 1000 milliLiter(s) (100 mL/Hr) IV Continuous <Continuous>  dextrose 50% Injectable 50 milliLiter(s) IV Push every 15 minutes  dextrose 50% Injectable 25 milliLiter(s) IV Push every 15 minutes  glucagon  Injectable 1 milliGRAM(s) IntraMuscular once  heparin   Injectable 5000 Unit(s) SubCutaneous every 8 hours  insulin glargine Injectable (LANTUS) 38 Unit(s) SubCutaneous at bedtime  insulin lispro (ADMELOG) corrective regimen sliding scale   SubCutaneous Before meals and at bedtime  insulin lispro Injectable (ADMELOG) 7 Unit(s) SubCutaneous three times a day before meals  metoprolol tartrate 12.5 milliGRAM(s) Oral every 6 hours  pantoprazole    Tablet 40 milliGRAM(s) Oral daily  polyethylene glycol 3350 17 Gram(s) Oral daily  senna 2 Tablet(s) Oral at bedtime  sodium chloride 0.9% lock flush 3 milliLiter(s) IV Push every 8 hours  sodium chloride 0.9%. 1000 milliLiter(s) (10 mL/Hr) IV Continuous <Continuous>    MEDICATIONS  (PRN):  acetaminophen     Tablet .. 650 milliGRAM(s) Oral every 6 hours PRN Mild Pain (1 - 3)  dextrose Oral Gel 15 Gram(s) Oral once PRN Blood Glucose LESS THAN 70 milliGRAM(s)/deciliter  oxyCODONE    IR 10 milliGRAM(s) Oral every 6 hours PRN Severe Pain (7 - 10)  oxyCODONE    IR 5 milliGRAM(s) Oral every 6 hours PRN Moderate Pain (4 - 6)      PHYSICAL EXAM  Vital Signs Last 24 Hrs  T(C): 36.4 (26 May 2023 21:43), Max: 36.8 (26 May 2023 01:43)  T(F): 97.5 (26 May 2023 21:43), Max: 98.2 (26 May 2023 01:43)  HR: 68 (26 May 2023 21:00) (56 - 133)  BP: 131/60 (26 May 2023 21:00) (95/50 - 131/60)  BP(mean): 86 (26 May 2023 21:00) (68 - 86)  RR: 20 (26 May 2023 21:00) (16 - 20)  SpO2: 95% (26 May 2023 21:00) (92% - 100%)    Parameters below as of 26 May 2023 21:00  Patient On (Oxygen Delivery Method): nasal cannula  O2 Flow (L/min): 2      Constitutional: wn/wd in NAD.   HEENT: NCAT, MMM, OP clear, EOMI, no proptosis or lid retraction  Neck: no thyromegaly or palpable thyroid nodules   Respiratory: lungs CTAB.  Cardiovascular: regular rhythm, normal S1 and S2, no audible murmurs, no peripheral edema  GI: soft, NT/ND, no masses/HSM appreciated.  Neurology: no tremors, DTR 2+  Skin: no visible rashes/lesions  Psychiatric: AAO x 3, normal affect/mood.    LABS:                        9.1    9.96  )-----------( 146      ( 26 May 2023 04:01 )             27.8     05-26    138  |  106  |  17  ----------------------------<  287<H>  4.0   |  23  |  1.29    Ca    8.4      26 May 2023 09:23  Phos  2.2     05-26  Mg     2.4     05-26    TPro  6.2  /  Alb  4.5  /  TBili  0.3  /  DBili  x   /  AST  20  /  ALT  11  /  AlkPhos  43  05-26    PT/INR - ( 26 May 2023 04:01 )   PT: 12.9 sec;   INR: 1.08          PTT - ( 26 May 2023 04:01 )  PTT:37.4 sec    Thyroid Stimulating Hormone, Serum: 1.200 uIU/mL (05-20 @ 14:21)      HbA1C:   CAPILLARY BLOOD GLUCOSE      Insulin Sliding Scale requirements X 24 Hours:    RADIOLOGY & ADDITIONAL TESTS:    A/P: 72y Male with history of DM type II presenting for       1.  DM -     Please continue           units lantus at bedtime  / in the morning and        units lispro with meals and lispro moderate / low dose sliding scale 4 times daily with meals and at bedtime.  Please continue consistent carbohydrate diet.      Goal FSG is   Will continue to monitor   For discharge, pt can continue    Pt can follow up at discharge with [facility] by calling  to make an appointment.   Will discuss case with     and update primary team

## 2023-05-26 NOTE — DIETITIAN INITIAL EVALUATION ADULT - NSFNSGIIOFT_GEN_A_CORE
05-25-23 @ 07:01  -  05-26-23 @ 07:00  --------------------------------------------------------  OUT:    Chest Tube (mL): 195 mL  Total OUT: 195 mL    Total NET: -195 mL      05-26-23 @ 07:01  -  05-26-23 @ 14:09  --------------------------------------------------------  OUT:    Chest Tube (mL): 10 mL  Total OUT: 10 mL    Total NET: -10 mL

## 2023-05-26 NOTE — PROGRESS NOTE ADULT - SUBJECTIVE AND OBJECTIVE BOX
SUBJECTIVE / INTERVAL HPI: Patient was seen and examined this morning.     CAPILLARY BLOOD GLUCOSE & INSULIN RECEIVED  119 mg/dL (05-25 @ 12:23)  179 mg/dL (05-25 @ 16:21)  261 mg/dL (05-25 @ 21:31)  319 mg/dL (05-26 @ 07:34)  220 mg/dL (05-26 @ 11:04)  225 mg/dL (05-26 @ 16:16)  202 mg/dL (05-26 @ 21:15)      REVIEW OF SYSTEMS  Constitutional:  Negative fever, chills or loss of appetite.  Eyes:  Negative blurry vision or double vision.  Cardiovascular:  Negative for chest pain or palpitations.  Respiratory:  Negative for cough, wheezing, or shortness of breath.    Gastrointestinal:  Negative for nausea, vomiting, diarrhea, constipation, or abdominal pain.  Genitourinary:  Negative frequency, urgency or dysuria.  Neurologic:  No headache, confusion, dizziness, lightheadedness.    PHYSICAL EXAM  Vital Signs Last 24 Hrs  T(C): 36.4 (26 May 2023 21:43), Max: 36.8 (26 May 2023 01:43)  T(F): 97.5 (26 May 2023 21:43), Max: 98.2 (26 May 2023 01:43)  HR: 68 (26 May 2023 21:00) (56 - 128)  BP: 131/60 (26 May 2023 21:00) (95/50 - 131/60)  BP(mean): 86 (26 May 2023 21:00) (68 - 86)  RR: 20 (26 May 2023 21:00) (16 - 20)  SpO2: 95% (26 May 2023 21:00) (92% - 100%)    Parameters below as of 26 May 2023 21:00  Patient On (Oxygen Delivery Method): nasal cannula  O2 Flow (L/min): 2      Constitutional: Awake, alert, in no acute distress.   HEENT: Normocephalic, atraumatic, MARSHALL.  Respiratory: Lungs clear to ausculation bilaterally.   Cardiovascular: regular rhythm, normal S1 and S2, no audible murmurs.   GI: soft, non-tender, non-distended, bowel sounds present.  Extremities: No lower extremity edema.  Psychiatric: AAO x 3. Normal affect/mood.     LABS  CBC - WBC/HGB/HTC/PLT: 9.96/9.1/27.8/146 (05-26-23)  BMP - Na/K/Cl/Bicarb/BUN/Cr/Gluc/AG/eGFR: 138/4.0/106/23/17/1.29/287/9/59 (05-26-23)  Ca - 8.4 (05-26-23)  Phos - -- (05-26-23)  Mg - 2.4 (05-26-23)  LFT - Alb/Tprot/Tbili/Dbili/AlkPhos/ALT/AST: 4.5/--/0.3/--/43/11/20 (05-26-23)  PT/aPTT/INR: 12.9/37.4/1.08 (05-26-23)   Thyroid Stimulating Hormone, Serum: 1.200 (05-20-23)      MEDICATIONS  MEDICATIONS  (STANDING):  albumin human  5% IVPB 750 milliLiter(s) IV Intermittent once  aMIOdarone    Tablet 400 milliGRAM(s) Oral every 8 hours  aMIOdarone    Tablet   Oral   aspirin enteric coated 81 milliGRAM(s) Oral daily  atorvastatin 40 milliGRAM(s) Oral at bedtime  chlorhexidine 2% Cloths 1 Application(s) Topical daily  clopidogrel Tablet 75 milliGRAM(s) Oral daily  dextrose 5%. 1000 milliLiter(s) (50 mL/Hr) IV Continuous <Continuous>  dextrose 5%. 1000 milliLiter(s) (100 mL/Hr) IV Continuous <Continuous>  dextrose 50% Injectable 50 milliLiter(s) IV Push every 15 minutes  dextrose 50% Injectable 25 milliLiter(s) IV Push every 15 minutes  glucagon  Injectable 1 milliGRAM(s) IntraMuscular once  heparin   Injectable 5000 Unit(s) SubCutaneous every 8 hours  insulin glargine Injectable (LANTUS) 38 Unit(s) SubCutaneous at bedtime  insulin lispro (ADMELOG) corrective regimen sliding scale   SubCutaneous Before meals and at bedtime  insulin lispro Injectable (ADMELOG) 7 Unit(s) SubCutaneous three times a day before meals  metoprolol tartrate 12.5 milliGRAM(s) Oral every 6 hours  pantoprazole    Tablet 40 milliGRAM(s) Oral daily  polyethylene glycol 3350 17 Gram(s) Oral daily  senna 2 Tablet(s) Oral at bedtime  sodium chloride 0.9% lock flush 3 milliLiter(s) IV Push every 8 hours  sodium chloride 0.9%. 1000 milliLiter(s) (10 mL/Hr) IV Continuous <Continuous>    MEDICATIONS  (PRN):  acetaminophen     Tablet .. 650 milliGRAM(s) Oral every 6 hours PRN Mild Pain (1 - 3)  dextrose Oral Gel 15 Gram(s) Oral once PRN Blood Glucose LESS THAN 70 milliGRAM(s)/deciliter  oxyCODONE    IR 10 milliGRAM(s) Oral every 6 hours PRN Severe Pain (7 - 10)  oxyCODONE    IR 5 milliGRAM(s) Oral every 6 hours PRN Moderate Pain (4 - 6)    ASSESSMENT / RECOMMENDATIONS    A1C: 10.2 %  BUN: 17  Creatinine: 1.29  GFR: 59  Weight: 69.4  BMI: 26.2  EF:     # Type 2 diabetes mellitus with hyperglycemia  - Please continue lantus *** units at bedtime.   - Continue lispro *** units before each meal.  - Continue lispro moderate / low dose sliding scale before meals and at bedtime.  - Patient's fingerstick glucose goal is 100-180 mg/dL.    - Discharge recommendations to be discussed.   - Patient can follow up at discharge with [facility] by calling [phone] to make an appointment.      Case discussed with Dr. Kennedy. Primary team updated.       Josue Ibarra    Endocrinology Fellow    Service Pager: [phone]    SUBJECTIVE / INTERVAL HPI: Patient was seen and examined this morning. His wife was at bedside. She says that his appetite continues to be decreased. He was transitioned off the insulin infusion yesterday at noon. Blood glucose levels increased overnight and he was given another dose of NPH at noon today to manage hyperglycemia.     DIET (he has been eating ~40% of his meals)  > Dinner: Chicken, mashed potatoes, soup.  > Breakfast: ?  > Lunch: Chicken, mashed potatoes, soup, fruit cup.     CAPILLARY BLOOD GLUCOSE & INSULIN RECEIVED  102 mg/dL (05-25 @ 08:01) ? Insulin infusion at 3 units/hr.   94 mg/dL (05-25 @ 09:05) ? Insulin infusion at 3 units/hr.   78 mg/dL (05-25 @ 09:57) ? Insulin infusion at 3 units/hr.   80 mg/dL (05-25 @ 10:56) ? Insulin infusion stopped.   119 mg/dL (05-25 @ 12:23) ? 14 units of NPH + 3 units of lispro.  179 mg/dL (05-25 @ 16:21) ? 3 units of lispro + 2 units of lispro sliding scale.   261 mg/dL (05-25 @ 21:31) ? 24 units of lantus + 6 units of lispro sliding scale.   319 mg/dL (05-26 @ 07:34) ? 3 units of lispro + 8 units of lispro sliding scale.   220 mg/dL (05-26 @ 11:04) ? 12 units of NPH + 5 units of lispro + 4 units of lispro sliding scale.  225 mg/dL (05-26 @ 16:16) ? 5 units of lispro + 4 units of lispro sliding scale.    REVIEW OF SYSTEMS  Constitutional:  (+) Loss of appetite. Negative fever, chills.   Cardiovascular: (+) Chest pain. Negative for palpitations.  Respiratory:  Negative for cough, wheezing, or shortness of breath.    Gastrointestinal:  Negative for nausea, vomiting, diarrhea, constipation, or abdominal pain.  Neurologic:  (+) Forgetfulness. No headache, dizziness, lightheadedness.    PHYSICAL EXAM  Vital Signs Last 24 Hrs  T(C): 36.4 (26 May 2023 21:43), Max: 36.8 (26 May 2023 01:43)  T(F): 97.5 (26 May 2023 21:43), Max: 98.2 (26 May 2023 01:43)  HR: 68 (26 May 2023 21:00) (56 - 128)  BP: 131/60 (26 May 2023 21:00) (95/50 - 131/60)  BP(mean): 86 (26 May 2023 21:00) (68 - 86)  RR: 20 (26 May 2023 21:00) (16 - 20)  SpO2: 95% (26 May 2023 21:00) (92% - 100%)    Parameters below as of 26 May 2023 21:00  Patient On (Oxygen Delivery Method): nasal cannula  O2 Flow (L/min): 2    Constitutional: Awake, alert, elderly male, in no acute distress.   HEENT: Normocephalic, atraumatic, MARSHALL.  Respiratory: Lungs clear to ausculation bilaterally.   Cardiovascular: regular rhythm, normal S1 and S2, no audible murmurs. (+) Sternotomy wound.  GI: soft, non-tender, non-distended, bowel sounds present.  Extremities: No lower extremity edema.    LABS  CBC - WBC/HGB/HTC/PLT: 9.96/9.1/27.8/146 (05-26-23)  BMP - Na/K/Cl/Bicarb/BUN/Cr/Gluc/AG/eGFR: 138/4.0/106/23/17/1.29/287/9/59 (05-26-23)  Ca - 8.4 (05-26-23)  Phos - -- (05-26-23)  Mg - 2.4 (05-26-23)  LFT - Alb/Tprot/Tbili/Dbili/AlkPhos/ALT/AST: 4.5/--/0.3/--/43/11/20 (05-26-23)  PT/aPTT/INR: 12.9/37.4/1.08 (05-26-23)   Thyroid Stimulating Hormone, Serum: 1.200 (05-20-23)    MEDICATIONS  MEDICATIONS  (STANDING):  albumin human  5% IVPB 750 milliLiter(s) IV Intermittent once  aMIOdarone    Tablet 400 milliGRAM(s) Oral every 8 hours  aMIOdarone    Tablet   Oral   aspirin enteric coated 81 milliGRAM(s) Oral daily  atorvastatin 40 milliGRAM(s) Oral at bedtime  chlorhexidine 2% Cloths 1 Application(s) Topical daily  clopidogrel Tablet 75 milliGRAM(s) Oral daily  dextrose 5%. 1000 milliLiter(s) (50 mL/Hr) IV Continuous <Continuous>  dextrose 5%. 1000 milliLiter(s) (100 mL/Hr) IV Continuous <Continuous>  dextrose 50% Injectable 50 milliLiter(s) IV Push every 15 minutes  dextrose 50% Injectable 25 milliLiter(s) IV Push every 15 minutes  glucagon  Injectable 1 milliGRAM(s) IntraMuscular once  heparin   Injectable 5000 Unit(s) SubCutaneous every 8 hours  insulin glargine Injectable (LANTUS) 38 Unit(s) SubCutaneous at bedtime  insulin lispro (ADMELOG) corrective regimen sliding scale   SubCutaneous Before meals and at bedtime  insulin lispro Injectable (ADMELOG) 7 Unit(s) SubCutaneous three times a day before meals  metoprolol tartrate 12.5 milliGRAM(s) Oral every 6 hours  pantoprazole    Tablet 40 milliGRAM(s) Oral daily  polyethylene glycol 3350 17 Gram(s) Oral daily  senna 2 Tablet(s) Oral at bedtime  sodium chloride 0.9% lock flush 3 milliLiter(s) IV Push every 8 hours  sodium chloride 0.9%. 1000 milliLiter(s) (10 mL/Hr) IV Continuous <Continuous>    MEDICATIONS  (PRN):  acetaminophen     Tablet .. 650 milliGRAM(s) Oral every 6 hours PRN Mild Pain (1 - 3)  dextrose Oral Gel 15 Gram(s) Oral once PRN Blood Glucose LESS THAN 70 milliGRAM(s)/deciliter  oxyCODONE    IR 10 milliGRAM(s) Oral every 6 hours PRN Severe Pain (7 - 10)  oxyCODONE    IR 5 milliGRAM(s) Oral every 6 hours PRN Moderate Pain (4 - 6)    ASSESSMENT / RECOMMENDATIONS  Mr. Fox is a 65-year-old male with a past medical history of type 2 diabetes mellitus, hypertension, hyperlipidemia, CVA (2016, no residual deficits) and Alzheimer's dementia who presented to [facility] emergency department complaining of chest pain. He was found to have NSTEMI and underwent cardiac cath revealing coronary artery disease. He was then transferred to [facility] for further management, now s/p CABG (5/24/2023). Endocrinology following for recommendations regarding diabetes management.     A1C: 10.2 %  BUN: 17  Creatinine: 1.29  GFR: 59  Weight: 69.4  BMI: 26.2    # Type 2 diabetes mellitus with hyperglycemia  - Blood glucose have been above target. His glucose increased overnight from 261 mg/dL to 319 mg/dL, reflecting he needs more basal insulin. In addition, his glucose was also elevated post-prandially despite receiving 3 units premeal and coverage. Suspect that he might also need more premeal insulin.   - Please INCREASE lantus to 38 units at bedtime.   - INCREASE lispro to 7 units before each meal.  - Continue lispro moderate dose sliding scale before meals and at bedtime.  - Patient's fingerstick glucose goal is 100-180 mg/dL.    - Discharge recommendations to be discussed.   - Patient can follow up at discharge with [facility] by calling [phone] to make an appointment.      Case discussed with Dr. Kennedy. Primary team updated.       Josue Ibarra    Endocrinology Fellow    Service Pager: [phone]

## 2023-05-26 NOTE — DIETITIAN INITIAL EVALUATION ADULT - OTHER INFO
66yo M former smoker  with PMHx of HTN, HLD, DM, CVA (2106) no residual deficits, Alzheimers, who presented to  with CP at rest. Pt's wife states he ambulated minimally around the house and develops SOB after walking 1 block. At [facility] he was r/i NSTEMI and underwent cardiac cath revealing mv CAD. He was started on a heparin gtt and transferred to [facility] under Dr Martinez for pre-op CABG workup. 5/24: OR. 5/26 stepdown to [facility]     Visited pt at bedside this am on 9[facility]. RN in the room. On assessment pt appears confused. Unable to obtain diet/wt hx given AMS. RD to re-attempt at f/u prn. RN reports appetite is improving, now tolerates ~40% meal tray. Recommend trialing ONS regimen to better meet pt estimated nutritional needs. No overt fat/muscle wasting noted. Nutrition related labs reviewed; high FSBG 220, 319 - insulin regimen ordered; low phosph (2.2). No PUs noted; with surgical incision to L leg; no edema noted; kasey scale 20. No nvdc noted per chart; LBM unknown; receives bowel regimen. Pain scale 1-8. View full recs below. Clinical nutrition services will continue to follow up pt per organizational policy. Please place new consult for any acute nutrition-related issues that may arise prior to follow up

## 2023-05-26 NOTE — DIETITIAN INITIAL EVALUATION ADULT - PERTINENT LABORATORY DATA
05-26    138  |  106  |  17  ----------------------------<  287<H>  4.0   |  23  |  1.29    Ca    8.4      26 May 2023 09:23  Phos  2.2     05-26  Mg     2.4     05-26    TPro  6.2  /  Alb  4.5  /  TBili  0.3  /  DBili  x   /  AST  20  /  ALT  11  /  AlkPhos  43  05-26  POCT Blood Glucose.: 220 mg/dL (05-26-23 @ 11:04)  A1C with Estimated Average Glucose Result: 10.2 % (05-20-23 @ 14:21)

## 2023-05-26 NOTE — PROGRESS NOTE ADULT - ASSESSMENT
64 y/o male with a PMHx of former smoker HTN, HLD, DM, CVA (2106) no residual deficits, Alzheimers, who p/w NSTEMI to  and tx to [facility] for CABG. On 5/24/23 pt underwent a OPCAB x 3 LIMA-LAD, SVG- OM-PDA  EF 55% with Dr. Calloway. Uneventful intra op. Extubated early AM POD 1. On low dose jose alberto, weaned off with volume. Overnight POD 1-2 Afib RVR s/p amio boluses, PO load, and uptitrated beta blocker. POD 2 pleural chest tube removed and transferred to [facility] for further care     Neurovascular:   #CVA in 2016   -no residual deficits   #Hx of Alzheimer's   -No delirium. Pain well controlled with current regimen.  -acetaminophen     Tablet .. 650 milliGRAM(s) every 6 hours PRN  -oxyCODONE    IR 10 milliGRAM(s) every 6 hours PRN  -oxyCODONE    IR 5 milliGRAM(s) every 6 hours PRN    Cardiovascular:   Hemodynamically stable. HR controlled  #S/p OPCAB  -continue aspirin and Plavix for acute graft patency,  -continue statin for long term graft patency  -continue metoprolol 12.5 Q6H   -Of note pt has a thick LV, do not over diuresis   #post op afib  -continue amio load  -PACEs team aware, pending recs   -continues to be in NSR   HR: 60 (56 - 155)  BP: 124/58 (95/50 - 128/60)    Respiratory:   02 Sat = 98% on RA.  RR: 18 (16 - 20)  SpO2: 95% (91% - 100%)  -Wean to RA from for O2 Sat > 93%.  -Encourage Cough, deep breathing and Use of IS 10x / hr while awake.  -Chest PT 4xdaily    GI:   Stable  Continue pantoprazole    Tablet 40 milliGRAM(s) daily  polyethylene glycol 3350 17 Gram(s) daily  senna 2 Tablet(s) at bedtime  -PO DASH diet    Renal / :   BUN/Cr Stable 17/1.29  -Continue to monitor I/O's.    Endocrine:    Blood sugar stable   #DM  -Continue Lantus 38 and 7 pre meal   A1C: 10.2  TSH: 1.200    Hematologic:  H/H stable 9.1/27.8  -DVT prophylaxis with Heparin sq    ID:  -Afebrile  -Continue to observe for SIRS/Sepsis Syndrome.  T(C): 36.5, Max: 37.2 (05-25-23 @ 22:49)    Disposition:  DC When medically ready

## 2023-05-26 NOTE — PROGRESS NOTE ADULT - SUBJECTIVE AND OBJECTIVE BOX
Patient discussed on morning rounds with Dr. Calloway     Operation / Date: 5/24/23: OPCAB x 3 LIMA-LAD, SVG- OM-PDA  EF 55%    SUBJECTIVE ASSESSMENT:  72y Male seen and examined at bedside with no complaints. Pt denies dizziness, vision changes, chest pain, palpitations, shortness of breath, cough, n/v/d, extremity swelling, calf tenderness.     Vital Signs Last 24 Hrs  T(C): 36.3 (26 May 2023 13:59), Max: 37.2 (25 May 2023 22:49)  T(F): 97.4 (26 May 2023 13:59), Max: 98.9 (25 May 2023 22:49)  HR: 58 (26 May 2023 13:20) (56 - 155)  BP: 119/54 (26 May 2023 13:00) (95/50 - 128/60)  BP(mean): 78 (26 May 2023 13:00) (68 - 86)  RR: 20 (26 May 2023 13:00) (16 - 20)  SpO2: 93% (26 May 2023 13:20) (91% - 100%)    Parameters below as of 26 May 2023 13:00  Patient On (Oxygen Delivery Method): room air      I&O's Detail    25 May 2023 07:01  -  26 May 2023 07:00  --------------------------------------------------------  IN:    Albumin 5%  - 250 mL: 1250 mL    Insulin: 9 mL    IV PiggyBack: 700 mL    Phenylephrine: 36.4 mL    sodium chloride 0.9%: 230 mL  Total IN: 2225.4 mL    OUT:    Bulb (mL): 225 mL    Chest Tube (mL): 195 mL    Indwelling Catheter - Urethral (mL): 1375 mL    Oral Fluid: 0 mL  Total OUT: 1795 mL    Total NET: 430.4 mL      26 May 2023 07:01  -  26 May 2023 14:54  --------------------------------------------------------  IN:    Albumin 5%  - 250 mL: 250 mL    IV PiggyBack: 150 mL    Oral Fluid: 120 mL    sodium chloride 0.9%: 40 mL  Total IN: 560 mL    OUT:    Bulb (mL): 75 mL    Chest Tube (mL): 10 mL    Indwelling Catheter - Urethral (mL): 110 mL  Total OUT: 195 mL    Total NET: 365 mL    CHEST TUBE:  none   LYNETTE DRAIN:  yes two   EPICARDIAL WIRES: yes   TIE DOWNS: yes  AVILEZ: none    PHYSICAL EXAM:  GEN: NAD, looks comfortable  Psych: Mood appropriate  Neuro: A&Ox3.  No focal deficits.  Moving all extremities.   HEENT: No obvious abnormalities  CV: S1S2, regular, no murmurs appreciated.  No carotid bruits.  No JVD  Lungs: Clear B/L.  No wheezing, rales or rhonchi  ABD: Soft, non-tender, non-distended.   EXT: Warm and well perfused.  No peripheral edema noted  Musculoskeletal: Moving all extremities with normal ROM, no joint swelling  PV: Pedal pulses palpable  Incisions: MSI clean dry and intact without drainage or bleeding. Drain sites are clean dry and intact. vein site is clean dry and intact without drainage     LABS:                        9.1    9.96  )-----------( 146      ( 26 May 2023 04:01 )             27.8       PT/INR - ( 26 May 2023 04:01 )   PT: 12.9 sec;   INR: 1.08          PTT - ( 26 May 2023 04:01 )  PTT:37.4 sec    05-26    138  |  106  |  17  ----------------------------<  287<H>  4.0   |  23  |  1.29    Ca    8.4      26 May 2023 09:23  Phos  2.2     05-26  Mg     2.4     05-26    TPro  6.2  /  Alb  4.5  /  TBili  0.3  /  DBili  x   /  AST  20  /  ALT  11  /  AlkPhos  43  05-26    MEDICATIONS  (STANDING):  albumin human  5% IVPB 750 milliLiter(s) IV Intermittent once  aMIOdarone    Tablet 400 milliGRAM(s) Oral every 8 hours  aMIOdarone    Tablet   Oral   aspirin enteric coated 81 milliGRAM(s) Oral daily  atorvastatin 40 milliGRAM(s) Oral at bedtime  chlorhexidine 2% Cloths 1 Application(s) Topical daily  clopidogrel Tablet 75 milliGRAM(s) Oral daily  dextrose 5%. 1000 milliLiter(s) (50 mL/Hr) IV Continuous <Continuous>  dextrose 5%. 1000 milliLiter(s) (100 mL/Hr) IV Continuous <Continuous>  dextrose 50% Injectable 25 milliLiter(s) IV Push every 15 minutes  dextrose 50% Injectable 50 milliLiter(s) IV Push every 15 minutes  glucagon  Injectable 1 milliGRAM(s) IntraMuscular once  heparin   Injectable 5000 Unit(s) SubCutaneous every 8 hours  insulin glargine Injectable (LANTUS) 28 Unit(s) SubCutaneous at bedtime  insulin lispro (ADMELOG) corrective regimen sliding scale   SubCutaneous Before meals and at bedtime  insulin lispro Injectable (ADMELOG) 5 Unit(s) SubCutaneous three times a day before meals  metoprolol tartrate 12.5 milliGRAM(s) Oral every 6 hours  pantoprazole    Tablet 40 milliGRAM(s) Oral daily  polyethylene glycol 3350 17 Gram(s) Oral daily  senna 2 Tablet(s) Oral at bedtime  sodium chloride 0.9% lock flush 3 milliLiter(s) IV Push every 8 hours  sodium chloride 0.9%. 1000 milliLiter(s) (10 mL/Hr) IV Continuous <Continuous>    MEDICATIONS  (PRN):  acetaminophen     Tablet .. 650 milliGRAM(s) Oral every 6 hours PRN Mild Pain (1 - 3)  dextrose Oral Gel 15 Gram(s) Oral once PRN Blood Glucose LESS THAN 70 milliGRAM(s)/deciliter  oxyCODONE    IR 10 milliGRAM(s) Oral every 6 hours PRN Severe Pain (7 - 10)  oxyCODONE    IR 5 milliGRAM(s) Oral every 6 hours PRN Moderate Pain (4 - 6)    RADIOLOGY & ADDITIONAL TESTS:  < from: Xray Chest 1 View-PORTABLE IMMEDIATE (Xray Chest 1 View-PORTABLE IMMEDIATE .) (05.26.23 @ 10:56) >      IMPRESSION:   Post left pleural chest tube removal since the previous study. No   pneumothorax.  No other significant interval change.    --- End of Report ---    < end of copied text >     Patient discussed on morning rounds with Dr. Calloway     Operation / Date: 5/24/23: OPCAB x 3 LIMA-LAD, SVG- OM-PDA  EF 55%    SUBJECTIVE ASSESSMENT:  72y Male seen and examined at bedside with no complaints. Pt denies dizziness, vision changes, chest pain, palpitations, shortness of breath, cough, n/v/d, extremity swelling, calf tenderness.     Vital Signs Last 24 Hrs  T(C): 36.3 (26 May 2023 13:59), Max: 37.2 (25 May 2023 22:49)  T(F): 97.4 (26 May 2023 13:59), Max: 98.9 (25 May 2023 22:49)  HR: 58 (26 May 2023 13:20) (56 - 155)  BP: 119/54 (26 May 2023 13:00) (95/50 - 128/60)  BP(mean): 78 (26 May 2023 13:00) (68 - 86)  RR: 20 (26 May 2023 13:00) (16 - 20)  SpO2: 93% (26 May 2023 13:20) (91% - 100%)    Parameters below as of 26 May 2023 13:00  Patient On (Oxygen Delivery Method): room air      I&O's Detail    25 May 2023 07:01  -  26 May 2023 07:00  --------------------------------------------------------  IN:    Albumin 5%  - 250 mL: 1250 mL    Insulin: 9 mL    IV PiggyBack: 700 mL    Phenylephrine: 36.4 mL    sodium chloride 0.9%: 230 mL  Total IN: 2225.4 mL    OUT:    Bulb (mL): 225 mL    Chest Tube (mL): 195 mL    Indwelling Catheter - Urethral (mL): 1375 mL    Oral Fluid: 0 mL  Total OUT: 1795 mL    Total NET: 430.4 mL      26 May 2023 07:01  -  26 May 2023 14:54  --------------------------------------------------------  IN:    Albumin 5%  - 250 mL: 250 mL    IV PiggyBack: 150 mL    Oral Fluid: 120 mL    sodium chloride 0.9%: 40 mL  Total IN: 560 mL    OUT:    Bulb (mL): 75 mL    Chest Tube (mL): 10 mL    Indwelling Catheter - Urethral (mL): 110 mL  Total OUT: 195 mL    Total NET: 365 mL    CHEST TUBE:  none   LYNETTE DRAIN:  yes two   EPICARDIAL WIRES: yes   TIE DOWNS: yes  AVILEZ: none    PHYSICAL EXAM:  GEN: NAD, looks comfortable  Psych: Mood appropriate  Neuro: A&Ox2.  No focal deficits.  Moving all extremities.   HEENT: No obvious abnormalities  CV: S1S2, regular, no murmurs appreciated.  No carotid bruits.  No JVD  Lungs: Clear B/L.  No wheezing, rales or rhonchi  ABD: Soft, non-tender, non-distended.   EXT: Warm and well perfused.  No peripheral edema noted  Musculoskeletal: Moving all extremities with normal ROM, no joint swelling  PV: Pedal pulses palpable  Incisions: MSI clean dry and intact without drainage or bleeding. Drain sites are clean dry and intact. vein site is clean dry and intact without drainage     LABS:                        9.1    9.96  )-----------( 146      ( 26 May 2023 04:01 )             27.8       PT/INR - ( 26 May 2023 04:01 )   PT: 12.9 sec;   INR: 1.08          PTT - ( 26 May 2023 04:01 )  PTT:37.4 sec    05-26    138  |  106  |  17  ----------------------------<  287<H>  4.0   |  23  |  1.29    Ca    8.4      26 May 2023 09:23  Phos  2.2     05-26  Mg     2.4     05-26    TPro  6.2  /  Alb  4.5  /  TBili  0.3  /  DBili  x   /  AST  20  /  ALT  11  /  AlkPhos  43  05-26    MEDICATIONS  (STANDING):  albumin human  5% IVPB 750 milliLiter(s) IV Intermittent once  aMIOdarone    Tablet 400 milliGRAM(s) Oral every 8 hours  aMIOdarone    Tablet   Oral   aspirin enteric coated 81 milliGRAM(s) Oral daily  atorvastatin 40 milliGRAM(s) Oral at bedtime  chlorhexidine 2% Cloths 1 Application(s) Topical daily  clopidogrel Tablet 75 milliGRAM(s) Oral daily  dextrose 5%. 1000 milliLiter(s) (50 mL/Hr) IV Continuous <Continuous>  dextrose 5%. 1000 milliLiter(s) (100 mL/Hr) IV Continuous <Continuous>  dextrose 50% Injectable 25 milliLiter(s) IV Push every 15 minutes  dextrose 50% Injectable 50 milliLiter(s) IV Push every 15 minutes  glucagon  Injectable 1 milliGRAM(s) IntraMuscular once  heparin   Injectable 5000 Unit(s) SubCutaneous every 8 hours  insulin glargine Injectable (LANTUS) 28 Unit(s) SubCutaneous at bedtime  insulin lispro (ADMELOG) corrective regimen sliding scale   SubCutaneous Before meals and at bedtime  insulin lispro Injectable (ADMELOG) 5 Unit(s) SubCutaneous three times a day before meals  metoprolol tartrate 12.5 milliGRAM(s) Oral every 6 hours  pantoprazole    Tablet 40 milliGRAM(s) Oral daily  polyethylene glycol 3350 17 Gram(s) Oral daily  senna 2 Tablet(s) Oral at bedtime  sodium chloride 0.9% lock flush 3 milliLiter(s) IV Push every 8 hours  sodium chloride 0.9%. 1000 milliLiter(s) (10 mL/Hr) IV Continuous <Continuous>    MEDICATIONS  (PRN):  acetaminophen     Tablet .. 650 milliGRAM(s) Oral every 6 hours PRN Mild Pain (1 - 3)  dextrose Oral Gel 15 Gram(s) Oral once PRN Blood Glucose LESS THAN 70 milliGRAM(s)/deciliter  oxyCODONE    IR 10 milliGRAM(s) Oral every 6 hours PRN Severe Pain (7 - 10)  oxyCODONE    IR 5 milliGRAM(s) Oral every 6 hours PRN Moderate Pain (4 - 6)    RADIOLOGY & ADDITIONAL TESTS:  < from: Xray Chest 1 View-PORTABLE IMMEDIATE (Xray Chest 1 View-PORTABLE IMMEDIATE .) (05.26.23 @ 10:56) >      IMPRESSION:   Post left pleural chest tube removal since the previous study. No   pneumothorax.  No other significant interval change.    --- End of Report ---    < end of copied text >

## 2023-05-27 LAB
ANION GAP SERPL CALC-SCNC: 12 MMOL/L — SIGNIFICANT CHANGE UP (ref 5–17)
ANION GAP SERPL CALC-SCNC: 9 MMOL/L — SIGNIFICANT CHANGE UP (ref 5–17)
BUN SERPL-MCNC: 24 MG/DL — HIGH (ref 7–23)
BUN SERPL-MCNC: 28 MG/DL — HIGH (ref 7–23)
CALCIUM SERPL-MCNC: 7.2 MG/DL — LOW (ref 8.4–10.5)
CALCIUM SERPL-MCNC: 8 MG/DL — LOW (ref 8.4–10.5)
CHLORIDE SERPL-SCNC: 105 MMOL/L — SIGNIFICANT CHANGE UP (ref 96–108)
CHLORIDE SERPL-SCNC: 105 MMOL/L — SIGNIFICANT CHANGE UP (ref 96–108)
CO2 SERPL-SCNC: 20 MMOL/L — LOW (ref 22–31)
CO2 SERPL-SCNC: 24 MMOL/L — SIGNIFICANT CHANGE UP (ref 22–31)
CREAT SERPL-MCNC: 1.46 MG/DL — HIGH (ref 0.5–1.3)
CREAT SERPL-MCNC: 1.54 MG/DL — HIGH (ref 0.5–1.3)
EGFR: 47 ML/MIN/1.73M2 — LOW
EGFR: 50 ML/MIN/1.73M2 — LOW
GLUCOSE BLDC GLUCOMTR-MCNC: 105 MG/DL — HIGH (ref 70–99)
GLUCOSE BLDC GLUCOMTR-MCNC: 127 MG/DL — HIGH (ref 70–99)
GLUCOSE BLDC GLUCOMTR-MCNC: 137 MG/DL — HIGH (ref 70–99)
GLUCOSE BLDC GLUCOMTR-MCNC: 144 MG/DL — HIGH (ref 70–99)
GLUCOSE BLDC GLUCOMTR-MCNC: 172 MG/DL — HIGH (ref 70–99)
GLUCOSE SERPL-MCNC: 141 MG/DL — HIGH (ref 70–99)
GLUCOSE SERPL-MCNC: 160 MG/DL — HIGH (ref 70–99)
HCT VFR BLD CALC: 27.8 % — LOW (ref 39–50)
HGB BLD-MCNC: 8.7 G/DL — LOW (ref 13–17)
MAGNESIUM SERPL-MCNC: 2.3 MG/DL — SIGNIFICANT CHANGE UP (ref 1.6–2.6)
MCHC RBC-ENTMCNC: 28.9 PG — SIGNIFICANT CHANGE UP (ref 27–34)
MCHC RBC-ENTMCNC: 31.3 GM/DL — LOW (ref 32–36)
MCV RBC AUTO: 92.4 FL — SIGNIFICANT CHANGE UP (ref 80–100)
NRBC # BLD: 0 /100 WBCS — SIGNIFICANT CHANGE UP (ref 0–0)
PLATELET # BLD AUTO: 155 K/UL — SIGNIFICANT CHANGE UP (ref 150–400)
POTASSIUM SERPL-MCNC: 4.2 MMOL/L — SIGNIFICANT CHANGE UP (ref 3.5–5.3)
POTASSIUM SERPL-MCNC: 4.7 MMOL/L — SIGNIFICANT CHANGE UP (ref 3.5–5.3)
POTASSIUM SERPL-SCNC: 4.2 MMOL/L — SIGNIFICANT CHANGE UP (ref 3.5–5.3)
POTASSIUM SERPL-SCNC: 4.7 MMOL/L — SIGNIFICANT CHANGE UP (ref 3.5–5.3)
RBC # BLD: 3.01 M/UL — LOW (ref 4.2–5.8)
RBC # FLD: 14.5 % — SIGNIFICANT CHANGE UP (ref 10.3–14.5)
SODIUM SERPL-SCNC: 137 MMOL/L — SIGNIFICANT CHANGE UP (ref 135–145)
SODIUM SERPL-SCNC: 138 MMOL/L — SIGNIFICANT CHANGE UP (ref 135–145)
WBC # BLD: 11.55 K/UL — HIGH (ref 3.8–10.5)
WBC # FLD AUTO: 11.55 K/UL — HIGH (ref 3.8–10.5)

## 2023-05-27 PROCEDURE — 71045 X-RAY EXAM CHEST 1 VIEW: CPT | Mod: 26,76

## 2023-05-27 RX ORDER — SODIUM CHLORIDE 9 MG/ML
1000 INJECTION, SOLUTION INTRAVENOUS
Refills: 0 | Status: DISCONTINUED | OUTPATIENT
Start: 2023-05-27 | End: 2023-05-28

## 2023-05-27 RX ADMIN — Medication 12.5 MILLIGRAM(S): at 00:33

## 2023-05-27 RX ADMIN — OXYCODONE HYDROCHLORIDE 10 MILLIGRAM(S): 5 TABLET ORAL at 07:00

## 2023-05-27 RX ADMIN — SODIUM CHLORIDE 3 MILLILITER(S): 9 INJECTION INTRAMUSCULAR; INTRAVENOUS; SUBCUTANEOUS at 23:10

## 2023-05-27 RX ADMIN — HEPARIN SODIUM 5000 UNIT(S): 5000 INJECTION INTRAVENOUS; SUBCUTANEOUS at 05:48

## 2023-05-27 RX ADMIN — OXYCODONE HYDROCHLORIDE 10 MILLIGRAM(S): 5 TABLET ORAL at 21:00

## 2023-05-27 RX ADMIN — Medication 7 UNIT(S): at 17:11

## 2023-05-27 RX ADMIN — Medication 650 MILLIGRAM(S): at 12:41

## 2023-05-27 RX ADMIN — POLYETHYLENE GLYCOL 3350 17 GRAM(S): 17 POWDER, FOR SOLUTION ORAL at 12:41

## 2023-05-27 RX ADMIN — Medication 7 UNIT(S): at 12:46

## 2023-05-27 RX ADMIN — Medication 81 MILLIGRAM(S): at 12:42

## 2023-05-27 RX ADMIN — OXYCODONE HYDROCHLORIDE 10 MILLIGRAM(S): 5 TABLET ORAL at 05:48

## 2023-05-27 RX ADMIN — Medication 7 UNIT(S): at 07:40

## 2023-05-27 RX ADMIN — ATORVASTATIN CALCIUM 40 MILLIGRAM(S): 80 TABLET, FILM COATED ORAL at 21:58

## 2023-05-27 RX ADMIN — INSULIN GLARGINE 38 UNIT(S): 100 INJECTION, SOLUTION SUBCUTANEOUS at 21:59

## 2023-05-27 RX ADMIN — Medication 650 MILLIGRAM(S): at 00:34

## 2023-05-27 RX ADMIN — AMIODARONE HYDROCHLORIDE 400 MILLIGRAM(S): 400 TABLET ORAL at 21:58

## 2023-05-27 RX ADMIN — Medication 12.5 MILLIGRAM(S): at 17:12

## 2023-05-27 RX ADMIN — Medication 12.5 MILLIGRAM(S): at 12:41

## 2023-05-27 RX ADMIN — PANTOPRAZOLE SODIUM 40 MILLIGRAM(S): 20 TABLET, DELAYED RELEASE ORAL at 12:42

## 2023-05-27 RX ADMIN — AMIODARONE HYDROCHLORIDE 400 MILLIGRAM(S): 400 TABLET ORAL at 05:48

## 2023-05-27 RX ADMIN — OXYCODONE HYDROCHLORIDE 10 MILLIGRAM(S): 5 TABLET ORAL at 19:42

## 2023-05-27 RX ADMIN — AMIODARONE HYDROCHLORIDE 400 MILLIGRAM(S): 400 TABLET ORAL at 13:50

## 2023-05-27 RX ADMIN — Medication 12.5 MILLIGRAM(S): at 05:48

## 2023-05-27 RX ADMIN — HEPARIN SODIUM 5000 UNIT(S): 5000 INJECTION INTRAVENOUS; SUBCUTANEOUS at 13:51

## 2023-05-27 RX ADMIN — CLOPIDOGREL BISULFATE 75 MILLIGRAM(S): 75 TABLET, FILM COATED ORAL at 12:42

## 2023-05-27 RX ADMIN — SODIUM CHLORIDE 3 MILLILITER(S): 9 INJECTION INTRAMUSCULAR; INTRAVENOUS; SUBCUTANEOUS at 13:22

## 2023-05-27 RX ADMIN — HEPARIN SODIUM 5000 UNIT(S): 5000 INJECTION INTRAVENOUS; SUBCUTANEOUS at 21:58

## 2023-05-27 RX ADMIN — SODIUM CHLORIDE 3 MILLILITER(S): 9 INJECTION INTRAMUSCULAR; INTRAVENOUS; SUBCUTANEOUS at 05:32

## 2023-05-27 RX ADMIN — Medication 650 MILLIGRAM(S): at 13:11

## 2023-05-27 RX ADMIN — SENNA PLUS 2 TABLET(S): 8.6 TABLET ORAL at 21:57

## 2023-05-27 RX ADMIN — Medication 650 MILLIGRAM(S): at 01:35

## 2023-05-27 RX ADMIN — SODIUM CHLORIDE 75 MILLILITER(S): 9 INJECTION, SOLUTION INTRAVENOUS at 11:33

## 2023-05-27 NOTE — PHYSICAL THERAPY INITIAL EVALUATION ADULT - PERTINENT HX OF CURRENT PROBLEM, REHAB EVAL
72 y.o M former smoker HTN, HLD, DM, CVA (2016) no residual deficits, Alzheimers, who p/w NSTEMI to  and tx to [facility]. Now s/p OP CABG x3

## 2023-05-27 NOTE — PROGRESS NOTE ADULT - SUBJECTIVE AND OBJECTIVE BOX
Patient discussed on morning rounds with Dr. Calloway     OPERATION & DATE: 5/24/23 - OPCAB (LIMA-LAD, SVG-OM-PDA) EF 55%    SUBJECTIVE ASSESSMENT: Pt is feeling well no complaints other than pain with coughing. Agrees to ambulate more in the hallways. Denies any chest pain, palpitations, orthopnea, dyspnea on exertion, shortness of breath, wheezing, abd pain, nausea, vomiting, constipation, lightheadedness, headaches, fevers, or chills.    VITAL SIGNS:  Vital Signs Last 24 Hrs  T(C): 36.2 (27 May 2023 01:01), Max: 36.5 (26 May 2023 17:28)  T(F): 97.1 (27 May 2023 01:01), Max: 97.7 (26 May 2023 17:28)  HR: 66 (27 May 2023 11:00) (56 - 104)  BP: 98/53 (27 May 2023 09:45) (98/53 - 131/60)  BP(mean): 67 (27 May 2023 09:45) (67 - 86)  RR: 18 (27 May 2023 11:00) (18 - 20)  SpO2: 96% (27 May 2023 11:00) (93% - 100%)    Parameters below as of 27 May 2023 11:00  Patient On (Oxygen Delivery Method): nasal cannula  O2 Flow (L/min): 2    I&O's Detail    26 May 2023 07:01  -  27 May 2023 07:00  --------------------------------------------------------  IN:    Albumin 5%  - 250 mL: 250 mL    IV PiggyBack: 150 mL    Oral Fluid: 820 mL    sodium chloride 0.9%: 40 mL  Total IN: 1260 mL    OUT:    Bulb (mL): 350 mL    Chest Tube (mL): 10 mL    Indwelling Catheter - Urethral (mL): 110 mL    Voided (mL): 275 mL  Total OUT: 745 mL    Total NET: 515 mL      27 May 2023 07:01  -  27 May 2023 12:48  --------------------------------------------------------  IN:    Oral Fluid: 250 mL  Total IN: 250 mL    OUT:    Bulb (mL): 0 mL    Voided (mL): 100 mL  Total OUT: 100 mL    Total NET: 150 mL    CHEST TUBE:  no  LYNETTE DRAIN:  yes x1  EPICARDIAL WIRES:  no  STITCHES: yes  STAPLES: no  AVILEZ:  no  CENTRAL LINE: no  MIDLINE/PICC: no  WOUND VAC: no    PHYSICAL EXAM:  General: well appearing sitting in chair in bed in NAD   Neurological: AOx3. Motor skills grossly intact  Cardiovascular: Normal S1/S2. Regular rate/rhythm. No murmurs  Respiratory: Lungs CTA bilaterally. No wheezing or rales  Gastrointestinal: +BS in all 4 quadrants. Non-distended. Soft. Non-tender  Extremities: Strength 5/5 b/l upper/lower extremities. Sensation grossly intact upper/lower extremities. No edema. No calf tenderness.  Vascular: Radial 2+bilaterally, DP 2+ b/l  Incision Sites: sternotomy healing well no erythema, purulence or ecchymosis.     LABS:                        8.7    11.55 )-----------( 155      ( 27 May 2023 07:09 )             27.8     PT/INR - ( 26 May 2023 04:01 )   PT: 12.9 sec;   INR: 1.08     PTT - ( 26 May 2023 04:01 )  PTT:37.4 sec    05-27    138  |  105  |  24<H>  ----------------------------<  141<H>  4.2   |  24  |  1.46<H>    Ca    8.0<L>      27 May 2023 07:09  Phos  2.2     05-26  Mg     2.3     05-27    TPro  6.2  /  Alb  4.5  /  TBili  0.3  /  DBili  x   /  AST  20  /  ALT  11  /  AlkPhos  43  05-26    MEDICATIONS  (STANDING):  aMIOdarone    Tablet   Oral   aMIOdarone    Tablet 400 milliGRAM(s) Oral every 8 hours  aspirin enteric coated 81 milliGRAM(s) Oral daily  atorvastatin 40 milliGRAM(s) Oral at bedtime  chlorhexidine 2% Cloths 1 Application(s) Topical daily  clopidogrel Tablet 75 milliGRAM(s) Oral daily  glucagon  Injectable 1 milliGRAM(s) IntraMuscular once  heparin   Injectable 5000 Unit(s) SubCutaneous every 8 hours  insulin glargine Injectable (LANTUS) 38 Unit(s) SubCutaneous at bedtime  insulin lispro (ADMELOG) corrective regimen sliding scale   SubCutaneous Before meals and at bedtime  insulin lispro Injectable (ADMELOG) 7 Unit(s) SubCutaneous three times a day before meals  lactated ringers. 1000 milliLiter(s) (75 mL/Hr) IV Continuous <Continuous>  metoprolol tartrate 12.5 milliGRAM(s) Oral every 6 hours  pantoprazole    Tablet 40 milliGRAM(s) Oral daily  polyethylene glycol 3350 17 Gram(s) Oral daily  senna 2 Tablet(s) Oral at bedtime  sodium chloride 0.9% lock flush 3 milliLiter(s) IV Push every 8 hours  sodium chloride 0.9%. 1000 milliLiter(s) (10 mL/Hr) IV Continuous <Continuous>    MEDICATIONS  (PRN):  acetaminophen     Tablet .. 650 milliGRAM(s) Oral every 6 hours PRN Mild Pain (1 - 3)  dextrose Oral Gel 15 Gram(s) Oral once PRN Blood Glucose LESS THAN 70 milliGRAM(s)/deciliter  oxyCODONE    IR 10 milliGRAM(s) Oral every 6 hours PRN Severe Pain (7 - 10)  oxyCODONE    IR 5 milliGRAM(s) Oral every 6 hours PRN Moderate Pain (4 - 6)    RADIOLOGY & ADDITIONAL TESTS:  < from: Xray Chest 1 View- PORTABLE-Routine (Xray Chest 1 View- PORTABLE-Routine in AM.) (05.27.23 @ 06:15) >  IMPRESSION: Discoid changes left lung field. Improvement discoid change   and/or infiltrate right lung base in comparison to prior examination of   the chest 5/26/2023  < end of copied text >

## 2023-05-27 NOTE — CHART NOTE - NSCHARTNOTEFT_GEN_A_CORE
V Wire removal:     Pt seen and examined at the bedside. Pt not requiring pacing from V wires and in own intrinsic rhythm, hemodynamically stable. Area cleaned with chlorohexidine x3. Gentle traction was applied to wire, and wire was then were pulled without incident. Pt tolerated the procedure well and remained hemodynamically stable.    CT Removal:    Pt seen and examined at bedside.  Case discussed with Dr. Calloway and is recommending removal of CT.  Minimal output from CT.  No air leak appreciated.  CT removed without incident.  Occlusive dressing placed. Follow up CXR with no obvious PTX noted.  Pt tolerated procedure well and remained hemodynamically stable.

## 2023-05-27 NOTE — PROGRESS NOTE ADULT - ASSESSMENT
71 y/o M with PMHx of former smoker, HTN, HLD, CVA (2016, no residual deficits), Alzheimers who presented with NSTEMI to [facility] and transferred to [facility] for CABG. On 5/24/23 pt underwent a OPCAB x 3 LIMA-LAD, SVG- OM-PDA  EF 55% with Dr. Calloway. Uneventful intraoperative course. Extubated early AM POD 1. On low dose jose alberto, weaned off with volume. Overnight POD 1-2 Afib RVR s/p amio boluses, PO load, and uptitrated beta blocker. POD2 pleural chest tube removed and transferred to [facility] for further care. POD3 pt is feeling well. PW removed and 1 yamilka remains. Pushing ambulation and IS use. Small increase in Cr, hydrating and following up 4pm.    Plan:    Neurovascular:   -Pain well controlled with current regimen. PRN's: acetaminophen, oxycodone 5-10  -hx CVA 2016, no residual deficits   -hx of Alzheimers    Cardiovascular:   -POD3 OPCABx3 (LIMA-LAD, SVG-OM-PDA), EF 55%     -continue with asa, plavix, statin    -postoperative AF       -continue with Amio, metoprolol tartrate 12.5mg Q6 hours  -Hemodynamically stable.   -Monitor: BP, HR, tele    Respiratory:   -Oxygenating well on room air  -Encourage continued use of IS 10x/hr and frequent ambulation  -CXR: no acute pathology no PTX     GI:  -GI PPX: senna, miralax   -PO Diet: consistent carbs/no snacks   -Bowel Regimen: senna, miralax    Renal / :  -Continue to monitor renal function: BUN/Cr 24/1.45    -hydrating and checking PM BMP  -Monitor I/O's daily     Endocrine:    -Hx of DM     -A1c: 10.2     -continue with lantus 38, lispro 7, RISS   -no hx of thyroid dx     -TSH: 1.2     Hematologic:  -CBC: H/H- 8.7/27  -Coagulation Panel: WNL     ID:  -Temperature: afebrile   -CBC: WBC- 11.5   -Continue to observe for SIRS/Sepsis Syndrome.    Prophylaxis:  -DVT prophylaxis with 5000 SubQ Heparin q8h.  -Continue with SCD's b/l while patient is at rest     Disposition:  -Discharge home once patient is medically ready

## 2023-05-27 NOTE — PHYSICAL THERAPY INITIAL EVALUATION ADULT - GENERAL OBSERVATIONS, REHAB EVAL
Patient received semi-gonzalez in bed  in NAD on RA, +SCDs, +PIV, +Abimael x1 to suction, +SDU Telemetry. Cleared by ZACK Powers. Agreeable to PT.

## 2023-05-28 LAB
ANION GAP SERPL CALC-SCNC: 10 MMOL/L — SIGNIFICANT CHANGE UP (ref 5–17)
BUN SERPL-MCNC: 26 MG/DL — HIGH (ref 7–23)
CALCIUM SERPL-MCNC: 8.3 MG/DL — LOW (ref 8.4–10.5)
CHLORIDE SERPL-SCNC: 106 MMOL/L — SIGNIFICANT CHANGE UP (ref 96–108)
CO2 SERPL-SCNC: 21 MMOL/L — LOW (ref 22–31)
CREAT SERPL-MCNC: 1.07 MG/DL — SIGNIFICANT CHANGE UP (ref 0.5–1.3)
EGFR: 73 ML/MIN/1.73M2 — SIGNIFICANT CHANGE UP
GLUCOSE BLDC GLUCOMTR-MCNC: 103 MG/DL — HIGH (ref 70–99)
GLUCOSE BLDC GLUCOMTR-MCNC: 126 MG/DL — HIGH (ref 70–99)
GLUCOSE BLDC GLUCOMTR-MCNC: 135 MG/DL — HIGH (ref 70–99)
GLUCOSE BLDC GLUCOMTR-MCNC: 198 MG/DL — HIGH (ref 70–99)
GLUCOSE SERPL-MCNC: 213 MG/DL — HIGH (ref 70–99)
HCT VFR BLD CALC: 27.6 % — LOW (ref 39–50)
HGB BLD-MCNC: 8.6 G/DL — LOW (ref 13–17)
MAGNESIUM SERPL-MCNC: 2.2 MG/DL — SIGNIFICANT CHANGE UP (ref 1.6–2.6)
MCHC RBC-ENTMCNC: 29.3 PG — SIGNIFICANT CHANGE UP (ref 27–34)
MCHC RBC-ENTMCNC: 31.2 GM/DL — LOW (ref 32–36)
MCV RBC AUTO: 93.9 FL — SIGNIFICANT CHANGE UP (ref 80–100)
NRBC # BLD: 0 /100 WBCS — SIGNIFICANT CHANGE UP (ref 0–0)
PLATELET # BLD AUTO: 171 K/UL — SIGNIFICANT CHANGE UP (ref 150–400)
POTASSIUM SERPL-MCNC: 4.8 MMOL/L — SIGNIFICANT CHANGE UP (ref 3.5–5.3)
POTASSIUM SERPL-SCNC: 4.8 MMOL/L — SIGNIFICANT CHANGE UP (ref 3.5–5.3)
RBC # BLD: 2.94 M/UL — LOW (ref 4.2–5.8)
RBC # FLD: 14.4 % — SIGNIFICANT CHANGE UP (ref 10.3–14.5)
SODIUM SERPL-SCNC: 137 MMOL/L — SIGNIFICANT CHANGE UP (ref 135–145)
WBC # BLD: 10.72 K/UL — HIGH (ref 3.8–10.5)
WBC # FLD AUTO: 10.72 K/UL — HIGH (ref 3.8–10.5)

## 2023-05-28 PROCEDURE — 71045 X-RAY EXAM CHEST 1 VIEW: CPT | Mod: 26

## 2023-05-28 RX ORDER — AMIODARONE HYDROCHLORIDE 400 MG/1
150 TABLET ORAL ONCE
Refills: 0 | Status: COMPLETED | OUTPATIENT
Start: 2023-05-28 | End: 2023-05-28

## 2023-05-28 RX ORDER — APIXABAN 2.5 MG/1
5 TABLET, FILM COATED ORAL EVERY 12 HOURS
Refills: 0 | Status: DISCONTINUED | OUTPATIENT
Start: 2023-05-28 | End: 2023-06-02

## 2023-05-28 RX ORDER — METOPROLOL TARTRATE 50 MG
50 TABLET ORAL EVERY 12 HOURS
Refills: 0 | Status: DISCONTINUED | OUTPATIENT
Start: 2023-05-28 | End: 2023-06-02

## 2023-05-28 RX ORDER — METOPROLOL TARTRATE 50 MG
12.5 TABLET ORAL ONCE
Refills: 0 | Status: COMPLETED | OUTPATIENT
Start: 2023-05-28 | End: 2023-05-28

## 2023-05-28 RX ORDER — FUROSEMIDE 40 MG
20 TABLET ORAL DAILY
Refills: 0 | Status: DISCONTINUED | OUTPATIENT
Start: 2023-05-29 | End: 2023-06-02

## 2023-05-28 RX ORDER — FUROSEMIDE 40 MG
20 TABLET ORAL ONCE
Refills: 0 | Status: COMPLETED | OUTPATIENT
Start: 2023-05-28 | End: 2023-05-28

## 2023-05-28 RX ORDER — METOPROLOL TARTRATE 50 MG
37.5 TABLET ORAL EVERY 12 HOURS
Refills: 0 | Status: DISCONTINUED | OUTPATIENT
Start: 2023-05-28 | End: 2023-05-28

## 2023-05-28 RX ORDER — ACETYLCYSTEINE 200 MG/ML
4 VIAL (ML) MISCELLANEOUS ONCE
Refills: 0 | Status: COMPLETED | OUTPATIENT
Start: 2023-05-28 | End: 2023-05-28

## 2023-05-28 RX ORDER — POTASSIUM CHLORIDE 20 MEQ
10 PACKET (EA) ORAL DAILY
Refills: 0 | Status: DISCONTINUED | OUTPATIENT
Start: 2023-05-29 | End: 2023-06-02

## 2023-05-28 RX ADMIN — AMIODARONE HYDROCHLORIDE 100 MILLIGRAM(S): 400 TABLET ORAL at 14:05

## 2023-05-28 RX ADMIN — Medication 650 MILLIGRAM(S): at 16:14

## 2023-05-28 RX ADMIN — AMIODARONE HYDROCHLORIDE 400 MILLIGRAM(S): 400 TABLET ORAL at 06:16

## 2023-05-28 RX ADMIN — PANTOPRAZOLE SODIUM 40 MILLIGRAM(S): 20 TABLET, DELAYED RELEASE ORAL at 11:36

## 2023-05-28 RX ADMIN — OXYCODONE HYDROCHLORIDE 10 MILLIGRAM(S): 5 TABLET ORAL at 11:35

## 2023-05-28 RX ADMIN — AMIODARONE HYDROCHLORIDE 400 MILLIGRAM(S): 400 TABLET ORAL at 14:05

## 2023-05-28 RX ADMIN — Medication 4 MILLILITER(S): at 21:23

## 2023-05-28 RX ADMIN — AMIODARONE HYDROCHLORIDE 400 MILLIGRAM(S): 400 TABLET ORAL at 21:23

## 2023-05-28 RX ADMIN — Medication 650 MILLIGRAM(S): at 03:53

## 2023-05-28 RX ADMIN — Medication 12.5 MILLIGRAM(S): at 06:15

## 2023-05-28 RX ADMIN — HEPARIN SODIUM 5000 UNIT(S): 5000 INJECTION INTRAVENOUS; SUBCUTANEOUS at 06:18

## 2023-05-28 RX ADMIN — CHLORHEXIDINE GLUCONATE 1 APPLICATION(S): 213 SOLUTION TOPICAL at 06:17

## 2023-05-28 RX ADMIN — OXYCODONE HYDROCHLORIDE 10 MILLIGRAM(S): 5 TABLET ORAL at 02:45

## 2023-05-28 RX ADMIN — Medication 81 MILLIGRAM(S): at 11:36

## 2023-05-28 RX ADMIN — OXYCODONE HYDROCHLORIDE 5 MILLIGRAM(S): 5 TABLET ORAL at 04:53

## 2023-05-28 RX ADMIN — Medication 12.5 MILLIGRAM(S): at 00:41

## 2023-05-28 RX ADMIN — Medication 50 MILLIGRAM(S): at 17:02

## 2023-05-28 RX ADMIN — SODIUM CHLORIDE 3 MILLILITER(S): 9 INJECTION INTRAMUSCULAR; INTRAVENOUS; SUBCUTANEOUS at 06:28

## 2023-05-28 RX ADMIN — Medication 650 MILLIGRAM(S): at 14:19

## 2023-05-28 RX ADMIN — POLYETHYLENE GLYCOL 3350 17 GRAM(S): 17 POWDER, FOR SOLUTION ORAL at 11:36

## 2023-05-28 RX ADMIN — Medication 7 UNIT(S): at 16:25

## 2023-05-28 RX ADMIN — OXYCODONE HYDROCHLORIDE 5 MILLIGRAM(S): 5 TABLET ORAL at 03:53

## 2023-05-28 RX ADMIN — CLOPIDOGREL BISULFATE 75 MILLIGRAM(S): 75 TABLET, FILM COATED ORAL at 11:36

## 2023-05-28 RX ADMIN — ATORVASTATIN CALCIUM 40 MILLIGRAM(S): 80 TABLET, FILM COATED ORAL at 21:03

## 2023-05-28 RX ADMIN — CHLORHEXIDINE GLUCONATE 1 APPLICATION(S): 213 SOLUTION TOPICAL at 11:36

## 2023-05-28 RX ADMIN — APIXABAN 5 MILLIGRAM(S): 2.5 TABLET, FILM COATED ORAL at 17:02

## 2023-05-28 RX ADMIN — SENNA PLUS 2 TABLET(S): 8.6 TABLET ORAL at 21:03

## 2023-05-28 RX ADMIN — Medication 650 MILLIGRAM(S): at 04:53

## 2023-05-28 RX ADMIN — Medication 20 MILLIGRAM(S): at 16:25

## 2023-05-28 RX ADMIN — Medication 12.5 MILLIGRAM(S): at 09:29

## 2023-05-28 RX ADMIN — INSULIN GLARGINE 38 UNIT(S): 100 INJECTION, SOLUTION SUBCUTANEOUS at 22:03

## 2023-05-28 RX ADMIN — SODIUM CHLORIDE 3 MILLILITER(S): 9 INJECTION INTRAMUSCULAR; INTRAVENOUS; SUBCUTANEOUS at 14:27

## 2023-05-28 RX ADMIN — OXYCODONE HYDROCHLORIDE 10 MILLIGRAM(S): 5 TABLET ORAL at 03:45

## 2023-05-28 RX ADMIN — Medication 650 MILLIGRAM(S): at 21:03

## 2023-05-28 RX ADMIN — OXYCODONE HYDROCHLORIDE 10 MILLIGRAM(S): 5 TABLET ORAL at 17:39

## 2023-05-28 RX ADMIN — Medication 650 MILLIGRAM(S): at 21:30

## 2023-05-28 RX ADMIN — Medication 2: at 07:33

## 2023-05-28 RX ADMIN — Medication 7 UNIT(S): at 11:37

## 2023-05-28 RX ADMIN — Medication 7 UNIT(S): at 07:34

## 2023-05-28 RX ADMIN — SODIUM CHLORIDE 3 MILLILITER(S): 9 INJECTION INTRAMUSCULAR; INTRAVENOUS; SUBCUTANEOUS at 20:59

## 2023-05-28 RX ADMIN — OXYCODONE HYDROCHLORIDE 10 MILLIGRAM(S): 5 TABLET ORAL at 12:38

## 2023-05-28 RX ADMIN — AMIODARONE HYDROCHLORIDE 100 MILLIGRAM(S): 400 TABLET ORAL at 17:16

## 2023-05-28 NOTE — PROVIDER CONTACT NOTE (OTHER) - SITUATION
pt converted to afib @ 10:06. denies any symptoms. HR 's.
pt was given amio bolus at approximately 1400. pt continued to be on uncontrolled afib at a rate of 120-130's pt remains asymptomatic.

## 2023-05-28 NOTE — PROGRESS NOTE ADULT - SUBJECTIVE AND OBJECTIVE BOX
Patient discussed on morning rounds with Dr. Calloway    OPERATION & DATE:    SUBJECTIVE ASSESSMENT:    VITAL SIGNS:  Vital Signs Last 24 Hrs  T(C): 36.4 (28 May 2023 10:14), Max: 36.8 (28 May 2023 01:23)  T(F): 97.5 (28 May 2023 10:14), Max: 98.2 (28 May 2023 01:23)  HR: 106 (28 May 2023 11:28) (64 - 106)  BP: 157/67 (28 May 2023 11:28) (122/57 - 162/67)  BP(mean): 97 (28 May 2023 11:28) (82 - 97)  RR: 18 (28 May 2023 11:28) (16 - 18)  SpO2: 95% (28 May 2023 11:28) (91% - 97%)    Parameters below as of 28 May 2023 11:28  Patient On (Oxygen Delivery Method): room air      I&O's Detail    27 May 2023 07:01  -  28 May 2023 07:00  --------------------------------------------------------  IN:    Lactated Ringers: 675 mL    Oral Fluid: 450 mL  Total IN: 1125 mL    OUT:    Bulb (mL): 10 mL    Voided (mL): 1085 mL  Total OUT: 1095 mL    Total NET: 30 mL      28 May 2023 07:01  -  28 May 2023 13:04  --------------------------------------------------------  IN:    Oral Fluid: 180 mL  Total IN: 180 mL    OUT:    Voided (mL): 150 mL  Total OUT: 150 mL    Total NET: 30 mL        CHEST TUBE:    LYNETTE DRAIN:    EPICARDIAL WIRES:   STITCHES:  STAPLES:  AVILEZ:   CENTRAL LINE:  MIDLINE/PICC:  WOUND VAC:    PHYSICAL EXAM:  General:  HEENT:  Cardio:  Pulm:  GI:  Extremities:  Vascular:  Incisions:    LABS:                        8.6    10.72 )-----------( 171      ( 28 May 2023 05:30 )             27.6       05-28    137  |  106  |  26<H>  ----------------------------<  213<H>  4.8   |  21<L>  |  1.07    Ca    8.3<L>      28 May 2023 05:30  Mg     2.2     05-28        MEDICATIONS  (STANDING):  aMIOdarone    Tablet 400 milliGRAM(s) Oral every 8 hours  aMIOdarone    Tablet   Oral   aMIOdarone IVPB 150 milliGRAM(s) IV Intermittent once  apixaban 5 milliGRAM(s) Oral every 12 hours  aspirin enteric coated 81 milliGRAM(s) Oral daily  atorvastatin 40 milliGRAM(s) Oral at bedtime  chlorhexidine 2% Cloths 1 Application(s) Topical daily  dextrose 5%. 1000 milliLiter(s) (50 mL/Hr) IV Continuous <Continuous>  dextrose 5%. 1000 milliLiter(s) (100 mL/Hr) IV Continuous <Continuous>  dextrose 50% Injectable 25 milliLiter(s) IV Push every 15 minutes  dextrose 50% Injectable 50 milliLiter(s) IV Push every 15 minutes  glucagon  Injectable 1 milliGRAM(s) IntraMuscular once  insulin glargine Injectable (LANTUS) 38 Unit(s) SubCutaneous at bedtime  insulin lispro (ADMELOG) corrective regimen sliding scale   SubCutaneous Before meals and at bedtime  insulin lispro Injectable (ADMELOG) 7 Unit(s) SubCutaneous three times a day before meals  lactated ringers. 1000 milliLiter(s) (75 mL/Hr) IV Continuous <Continuous>  metoprolol tartrate 37.5 milliGRAM(s) Oral every 12 hours  pantoprazole    Tablet 40 milliGRAM(s) Oral daily  polyethylene glycol 3350 17 Gram(s) Oral daily  senna 2 Tablet(s) Oral at bedtime  sodium chloride 0.9% lock flush 3 milliLiter(s) IV Push every 8 hours  sodium chloride 0.9%. 1000 milliLiter(s) (10 mL/Hr) IV Continuous <Continuous>    MEDICATIONS  (PRN):  acetaminophen     Tablet .. 650 milliGRAM(s) Oral every 6 hours PRN Mild Pain (1 - 3)  dextrose Oral Gel 15 Gram(s) Oral once PRN Blood Glucose LESS THAN 70 milliGRAM(s)/deciliter  oxyCODONE    IR 10 milliGRAM(s) Oral every 6 hours PRN Severe Pain (7 - 10)  oxyCODONE    IR 5 milliGRAM(s) Oral every 6 hours PRN Moderate Pain (4 - 6)    RADIOLOGY & ADDITIONAL TESTS:     Patient discussed on morning rounds with Dr. Calloway    OPERATION & DATE: 5/24/23 -- OPCABx3 (LIMA-LAD, SVG-OM-PDA), EF 55%    SUBJECTIVE ASSESSMENT:  Pt is feeling well no complaints. Eating/drinking. Denies any chest pain, palpitations, orthopnea, dyspnea on exertion, shortness of breath, wheezing, abd pain, nausea, vomiting, constipation, lightheadedness, headaches, fevers, or chills.    VITAL SIGNS:  Vital Signs Last 24 Hrs  T(C): 36.4 (28 May 2023 10:14), Max: 36.8 (28 May 2023 01:23)  T(F): 97.5 (28 May 2023 10:14), Max: 98.2 (28 May 2023 01:23)  HR: 106 (28 May 2023 11:28) (64 - 106)  BP: 157/67 (28 May 2023 11:28) (122/57 - 162/67)  BP(mean): 97 (28 May 2023 11:28) (82 - 97)  RR: 18 (28 May 2023 11:28) (16 - 18)  SpO2: 95% (28 May 2023 11:28) (91% - 97%)    Parameters below as of 28 May 2023 11:28  Patient On (Oxygen Delivery Method): room air      I&O's Detail    27 May 2023 07:01  -  28 May 2023 07:00  --------------------------------------------------------  IN:    Lactated Ringers: 675 mL    Oral Fluid: 450 mL  Total IN: 1125 mL    OUT:    Bulb (mL): 10 mL    Voided (mL): 1085 mL  Total OUT: 1095 mL    Total NET: 30 mL      28 May 2023 07:01  -  28 May 2023 13:04  --------------------------------------------------------  IN:    Oral Fluid: 180 mL  Total IN: 180 mL    OUT:    Voided (mL): 150 mL  Total OUT: 150 mL    Total NET: 30 mL    CHEST TUBE:  no  LYNETTE DRAIN:   yes -- to be removed today   EPICARDIAL WIRES:  no  STITCHES: no  STAPLES: no  AVILEZ:  no  CENTRAL LINE: no  MIDLINE/PICC: no  WOUND VAC: no     PHYSICAL EXAM:  General: well appearing sitting in chair in NAD   HEENT: normocephalic, atraumatic  Cardio: normal s1/s2, no murmurs   Pulm: lungs cta b/l, no wheezing  GI: +BS 4 quadrants, soft, nontender  Extremities: no edema, no calf tenderness  Vascular: DP 2+ b/l, Radial 2+ b/l  Incisions: sternotomy healing well no erythema, purulence or ecchymosis. EVH site healing well no erythema, purulence or ecchymosis.     LABS:             8.6    10.72 )-----------( 171      ( 28 May 2023 05:30 )             27.6     05-28    137  |  106  |  26<H>  ----------------------------<  213<H>  4.8   |  21<L>  |  1.07    Ca    8.3<L>      28 May 2023 05:30  Mg     2.2     05-28    MEDICATIONS  (STANDING):  aMIOdarone    Tablet 400 milliGRAM(s) Oral every 8 hours  aMIOdarone    Tablet   Oral   aMIOdarone IVPB 150 milliGRAM(s) IV Intermittent once  apixaban 5 milliGRAM(s) Oral every 12 hours  aspirin enteric coated 81 milliGRAM(s) Oral daily  atorvastatin 40 milliGRAM(s) Oral at bedtime  chlorhexidine 2% Cloths 1 Application(s) Topical daily  glucagon  Injectable 1 milliGRAM(s) IntraMuscular once  insulin glargine Injectable (LANTUS) 38 Unit(s) SubCutaneous at bedtime  insulin lispro (ADMELOG) corrective regimen sliding scale   SubCutaneous Before meals and at bedtime  insulin lispro Injectable (ADMELOG) 7 Unit(s) SubCutaneous three times a day before meals  lactated ringers. 1000 milliLiter(s) (75 mL/Hr) IV Continuous <Continuous>  metoprolol tartrate 37.5 milliGRAM(s) Oral every 12 hours  pantoprazole    Tablet 40 milliGRAM(s) Oral daily  polyethylene glycol 3350 17 Gram(s) Oral daily  senna 2 Tablet(s) Oral at bedtime  sodium chloride 0.9% lock flush 3 milliLiter(s) IV Push every 8 hours  sodium chloride 0.9%. 1000 milliLiter(s) (10 mL/Hr) IV Continuous <Continuous>    MEDICATIONS  (PRN):  acetaminophen     Tablet .. 650 milliGRAM(s) Oral every 6 hours PRN Mild Pain (1 - 3)  dextrose Oral Gel 15 Gram(s) Oral once PRN Blood Glucose LESS THAN 70 milliGRAM(s)/deciliter  oxyCODONE    IR 10 milliGRAM(s) Oral every 6 hours PRN Severe Pain (7 - 10)  oxyCODONE    IR 5 milliGRAM(s) Oral every 6 hours PRN Moderate Pain (4 - 6)    RADIOLOGY & ADDITIONAL TESTS:  none

## 2023-05-29 LAB
ANION GAP SERPL CALC-SCNC: 10 MMOL/L — SIGNIFICANT CHANGE UP (ref 5–17)
APTT BLD: 32.6 SEC — SIGNIFICANT CHANGE UP (ref 27.5–35.5)
BUN SERPL-MCNC: 29 MG/DL — HIGH (ref 7–23)
CALCIUM SERPL-MCNC: 8.7 MG/DL — SIGNIFICANT CHANGE UP (ref 8.4–10.5)
CHLORIDE SERPL-SCNC: 104 MMOL/L — SIGNIFICANT CHANGE UP (ref 96–108)
CO2 SERPL-SCNC: 23 MMOL/L — SIGNIFICANT CHANGE UP (ref 22–31)
CREAT SERPL-MCNC: 1.16 MG/DL — SIGNIFICANT CHANGE UP (ref 0.5–1.3)
EGFR: 67 ML/MIN/1.73M2 — SIGNIFICANT CHANGE UP
GLUCOSE BLDC GLUCOMTR-MCNC: 181 MG/DL — HIGH (ref 70–99)
GLUCOSE BLDC GLUCOMTR-MCNC: 203 MG/DL — HIGH (ref 70–99)
GLUCOSE BLDC GLUCOMTR-MCNC: 226 MG/DL — HIGH (ref 70–99)
GLUCOSE BLDC GLUCOMTR-MCNC: 248 MG/DL — HIGH (ref 70–99)
GLUCOSE BLDC GLUCOMTR-MCNC: 277 MG/DL — HIGH (ref 70–99)
GLUCOSE SERPL-MCNC: 281 MG/DL — HIGH (ref 70–99)
HCT VFR BLD CALC: 29 % — LOW (ref 39–50)
HGB BLD-MCNC: 9.2 G/DL — LOW (ref 13–17)
INR BLD: 1.22 — HIGH (ref 0.88–1.16)
MAGNESIUM SERPL-MCNC: 2.1 MG/DL — SIGNIFICANT CHANGE UP (ref 1.6–2.6)
MCHC RBC-ENTMCNC: 28.9 PG — SIGNIFICANT CHANGE UP (ref 27–34)
MCHC RBC-ENTMCNC: 31.7 GM/DL — LOW (ref 32–36)
MCV RBC AUTO: 91.2 FL — SIGNIFICANT CHANGE UP (ref 80–100)
NRBC # BLD: 0 /100 WBCS — SIGNIFICANT CHANGE UP (ref 0–0)
PLATELET # BLD AUTO: 263 K/UL — SIGNIFICANT CHANGE UP (ref 150–400)
POTASSIUM SERPL-MCNC: 4.5 MMOL/L — SIGNIFICANT CHANGE UP (ref 3.5–5.3)
POTASSIUM SERPL-SCNC: 4.5 MMOL/L — SIGNIFICANT CHANGE UP (ref 3.5–5.3)
PROTHROM AB SERPL-ACNC: 14.5 SEC — HIGH (ref 10.5–13.4)
RBC # BLD: 3.18 M/UL — LOW (ref 4.2–5.8)
RBC # FLD: 14 % — SIGNIFICANT CHANGE UP (ref 10.3–14.5)
SODIUM SERPL-SCNC: 137 MMOL/L — SIGNIFICANT CHANGE UP (ref 135–145)
WBC # BLD: 10.75 K/UL — HIGH (ref 3.8–10.5)
WBC # FLD AUTO: 10.75 K/UL — HIGH (ref 3.8–10.5)

## 2023-05-29 PROCEDURE — 71045 X-RAY EXAM CHEST 1 VIEW: CPT | Mod: 26

## 2023-05-29 RX ORDER — FUROSEMIDE 40 MG
20 TABLET ORAL ONCE
Refills: 0 | Status: COMPLETED | OUTPATIENT
Start: 2023-05-29 | End: 2023-05-29

## 2023-05-29 RX ADMIN — PANTOPRAZOLE SODIUM 40 MILLIGRAM(S): 20 TABLET, DELAYED RELEASE ORAL at 11:50

## 2023-05-29 RX ADMIN — OXYCODONE HYDROCHLORIDE 10 MILLIGRAM(S): 5 TABLET ORAL at 17:05

## 2023-05-29 RX ADMIN — Medication 50 MILLIGRAM(S): at 05:11

## 2023-05-29 RX ADMIN — OXYCODONE HYDROCHLORIDE 10 MILLIGRAM(S): 5 TABLET ORAL at 07:58

## 2023-05-29 RX ADMIN — Medication 7 UNIT(S): at 07:16

## 2023-05-29 RX ADMIN — APIXABAN 5 MILLIGRAM(S): 2.5 TABLET, FILM COATED ORAL at 05:11

## 2023-05-29 RX ADMIN — Medication 4: at 11:48

## 2023-05-29 RX ADMIN — OXYCODONE HYDROCHLORIDE 10 MILLIGRAM(S): 5 TABLET ORAL at 17:35

## 2023-05-29 RX ADMIN — Medication 650 MILLIGRAM(S): at 21:12

## 2023-05-29 RX ADMIN — Medication 650 MILLIGRAM(S): at 05:14

## 2023-05-29 RX ADMIN — Medication 4: at 07:15

## 2023-05-29 RX ADMIN — Medication 7 UNIT(S): at 17:05

## 2023-05-29 RX ADMIN — Medication 10 MILLIEQUIVALENT(S): at 11:50

## 2023-05-29 RX ADMIN — SODIUM CHLORIDE 3 MILLILITER(S): 9 INJECTION INTRAMUSCULAR; INTRAVENOUS; SUBCUTANEOUS at 13:23

## 2023-05-29 RX ADMIN — AMIODARONE HYDROCHLORIDE 400 MILLIGRAM(S): 400 TABLET ORAL at 05:11

## 2023-05-29 RX ADMIN — Medication 600 MILLIGRAM(S): at 21:13

## 2023-05-29 RX ADMIN — AMIODARONE HYDROCHLORIDE 400 MILLIGRAM(S): 400 TABLET ORAL at 15:10

## 2023-05-29 RX ADMIN — SODIUM CHLORIDE 3 MILLILITER(S): 9 INJECTION INTRAMUSCULAR; INTRAVENOUS; SUBCUTANEOUS at 05:18

## 2023-05-29 RX ADMIN — Medication 20 MILLIGRAM(S): at 05:11

## 2023-05-29 RX ADMIN — Medication 650 MILLIGRAM(S): at 12:17

## 2023-05-29 RX ADMIN — Medication 10 MILLIGRAM(S): at 19:49

## 2023-05-29 RX ADMIN — Medication 81 MILLIGRAM(S): at 11:49

## 2023-05-29 RX ADMIN — Medication 650 MILLIGRAM(S): at 11:47

## 2023-05-29 RX ADMIN — Medication 4: at 17:05

## 2023-05-29 RX ADMIN — SENNA PLUS 2 TABLET(S): 8.6 TABLET ORAL at 21:12

## 2023-05-29 RX ADMIN — Medication 650 MILLIGRAM(S): at 05:45

## 2023-05-29 RX ADMIN — ATORVASTATIN CALCIUM 40 MILLIGRAM(S): 80 TABLET, FILM COATED ORAL at 21:13

## 2023-05-29 RX ADMIN — POLYETHYLENE GLYCOL 3350 17 GRAM(S): 17 POWDER, FOR SOLUTION ORAL at 11:49

## 2023-05-29 RX ADMIN — Medication 50 MILLIGRAM(S): at 17:05

## 2023-05-29 RX ADMIN — INSULIN GLARGINE 38 UNIT(S): 100 INJECTION, SOLUTION SUBCUTANEOUS at 21:12

## 2023-05-29 RX ADMIN — OXYCODONE HYDROCHLORIDE 10 MILLIGRAM(S): 5 TABLET ORAL at 08:28

## 2023-05-29 RX ADMIN — Medication 7 UNIT(S): at 11:48

## 2023-05-29 RX ADMIN — Medication 6: at 21:15

## 2023-05-29 RX ADMIN — APIXABAN 5 MILLIGRAM(S): 2.5 TABLET, FILM COATED ORAL at 17:05

## 2023-05-29 RX ADMIN — Medication 650 MILLIGRAM(S): at 21:42

## 2023-05-29 RX ADMIN — Medication 20 MILLIGRAM(S): at 19:49

## 2023-05-29 RX ADMIN — SODIUM CHLORIDE 3 MILLILITER(S): 9 INJECTION INTRAMUSCULAR; INTRAVENOUS; SUBCUTANEOUS at 21:19

## 2023-05-29 NOTE — PROGRESS NOTE ADULT - SUBJECTIVE AND OBJECTIVE BOX
Patient discussed on morning rounds with Dr. Calloway     Operation / Date: 5/24/23 -- OPCABx3 (LIMA-LAD, SVG-OM-PDA), EF 55%    SUBJECTIVE ASSESSMENT:  72y Male         Vital Signs Last 24 Hrs  T(C): 36.1 (29 May 2023 16:20), Max: 36.9 (29 May 2023 13:47)  T(F): 97 (29 May 2023 16:20), Max: 98.5 (29 May 2023 13:47)  HR: 72 (29 May 2023 17:11) (60 - 116)  BP: 148/67 (29 May 2023 17:11) (117/56 - 163/81)  BP(mean): 96 (29 May 2023 17:11) (80 - 110)  RR: 18 (29 May 2023 17:11) (16 - 18)  SpO2: 98% (29 May 2023 17:11) (92% - 99%)    Parameters below as of 29 May 2023 17:11  Patient On (Oxygen Delivery Method): room air      I&O's Detail    28 May 2023 07:01  -  29 May 2023 07:00  --------------------------------------------------------  IN:    IV PiggyBack: 200 mL    Oral Fluid: 505 mL  Total IN: 705 mL    OUT:    Voided (mL): 1550 mL  Total OUT: 1550 mL    Total NET: -845 mL      29 May 2023 07:01  -  29 May 2023 18:08  --------------------------------------------------------  IN:    Oral Fluid: 700 mL  Total IN: 700 mL    OUT:    Voided (mL): 200 mL  Total OUT: 200 mL    Total NET: 500 mL    CHEST TUBE:  Yes/No. AIR LEAKS: Yes/No. Suction / H2O SEAL.   LYNETTE DRAIN:  Yes/No.  EPICARDIAL WIRES: Yes/No.  TIE DOWNS: Yes/No.  AVILEZ: Yes/No.    PHYSICAL EXAM:    General:     Neurological:    Cardiovascular:    Respiratory:    Gastrointestinal:    Extremities:    Vascular:    Incision Sites:    LABS:                        9.2    10.75 )-----------( 263      ( 29 May 2023 05:30 )             29.0     COUMADIN: No.      PT/INR - ( 29 May 2023 05:30 )   PT: 14.5 sec;   INR: 1.22          PTT - ( 29 May 2023 05:30 )  PTT:32.6 sec    05-29    137  |  104  |  29<H>  ----------------------------<  281<H>  4.5   |  23  |  1.16    Ca    8.7      29 May 2023 05:30  Mg     2.1     05-29    MEDICATIONS  (STANDING):  aMIOdarone    Tablet 200 milliGRAM(s) Oral daily  aMIOdarone    Tablet   Oral   apixaban 5 milliGRAM(s) Oral every 12 hours  aspirin enteric coated 81 milliGRAM(s) Oral daily  atorvastatin 40 milliGRAM(s) Oral at bedtime  dextrose 5%. 1000 milliLiter(s) (50 mL/Hr) IV Continuous <Continuous>  dextrose 5%. 1000 milliLiter(s) (100 mL/Hr) IV Continuous <Continuous>  dextrose 50% Injectable 50 milliLiter(s) IV Push every 15 minutes  dextrose 50% Injectable 25 milliLiter(s) IV Push every 15 minutes  furosemide    Tablet 20 milliGRAM(s) Oral daily  furosemide   Injectable 20 milliGRAM(s) IV Push once  glucagon  Injectable 1 milliGRAM(s) IntraMuscular once  insulin glargine Injectable (LANTUS) 38 Unit(s) SubCutaneous at bedtime  insulin lispro (ADMELOG) corrective regimen sliding scale   SubCutaneous Before meals and at bedtime  insulin lispro Injectable (ADMELOG) 7 Unit(s) SubCutaneous three times a day before meals  metoprolol tartrate 50 milliGRAM(s) Oral every 12 hours  pantoprazole    Tablet 40 milliGRAM(s) Oral daily  polyethylene glycol 3350 17 Gram(s) Oral daily  potassium chloride    Tablet ER 10 milliEquivalent(s) Oral daily  senna 2 Tablet(s) Oral at bedtime  sodium chloride 0.9% lock flush 3 milliLiter(s) IV Push every 8 hours  sodium chloride 0.9%. 1000 milliLiter(s) (10 mL/Hr) IV Continuous <Continuous>    MEDICATIONS  (PRN):  acetaminophen     Tablet .. 650 milliGRAM(s) Oral every 6 hours PRN Mild Pain (1 - 3)  dextrose Oral Gel 15 Gram(s) Oral once PRN Blood Glucose LESS THAN 70 milliGRAM(s)/deciliter  oxyCODONE    IR 10 milliGRAM(s) Oral every 6 hours PRN Severe Pain (7 - 10)  oxyCODONE    IR 5 milliGRAM(s) Oral every 6 hours PRN Moderate Pain (4 - 6)        RADIOLOGY & ADDITIONAL TESTS:

## 2023-05-29 NOTE — PROGRESS NOTE ADULT - ASSESSMENT
71 y/o M with PMHx of former smoker, HTN, HLD, CVA (2016, no residual deficits), Alzheimers who presented with NSTEMI to [facility] and transferred to [facility] for CABG. On 5/24/23 pt underwent a OPCAB x 3 LIMA-LAD, SVG- OM-PDA  EF 55% with Dr. Calloway. Uneventful intraoperative course. Extubated early AM POD 1. On low dose jose alberto, weaned off with volume. Overnight POD 1-2 Afib RVR s/p amio boluses, PO load, and uptitrated beta blocker. POD2 pleural chest tube removed and transferred to [facility] for further care. POD3 pt is feeling well. PW removed and 1 yamilka remains. Pushing ambulation and IS use. Small increase in Cr, hydrating. POD5 patient with some congestion, encouraged IS and pulmonary toilet, given lasix.    Plan:  Neurovascular:   -Pain well controlled with current regimen. PRN's: acetaminophen, oxycodone 5-10mg   -hx CVA 2016, no residual deficits   -hx of Alzheimers    Cardiovascular:   -POD5 OPCABx3 (LIMA-LAD, SVG-OM-PDA), EF 55%     -continue with asa, statin (stopped Plavix today per Dr. Calloway)    -postoperative AF       -was in NSR, back in AF 5/28 to 110s, now back to NSR.      -started eliquis per Dr. Calloway       -continue with Amio, metoprolol tartrate 12.5mg Q6 hours  -Hemodynamically stable.   -Monitor: BP, HR, tele    Respiratory:   -Oxygenating well on room air  -Encourage continued use of IS 10x/hr and frequent ambulation  -CXR: no acute pathology no PTX     GI:  -GI PPX: senna, miralax   -PO Diet: consistent carbs/no snacks   -Bowel Regimen: senna, miralax    Renal / :  -Continue to monitor renal function: BUN/Cr 29/1.16   -Monitor I/O's daily     Endocrine:    -Hx of DM     -A1c: 10.2     -continue with lantus 38, lispro 7, RISS   -no hx of thyroid dx     -TSH: 1.2     Hematologic:  -CBC: H/H- 9.2/29  -Coagulation Panel: WNL     ID:  -Temperature: afebrile   -CBC: WBC- 10.75  -Continue to observe for SIRS/Sepsis Syndrome.    Prophylaxis:  -DVT prophylaxis with eliquis  -Continue with SCD's b/l while patient is at rest     Dispo:  -pending [facility]

## 2023-05-30 VITALS — BODY MASS INDEX: 26.12 KG/M2 | WEIGHT: 153 LBS | HEIGHT: 64.02 IN

## 2023-05-30 PROBLEM — Z86.39 HISTORY OF HYPERLIPIDEMIA: Status: RESOLVED | Noted: 2023-05-30 | Resolved: 2023-05-30

## 2023-05-30 PROBLEM — Z86.39 HISTORY OF DIABETES MELLITUS: Status: RESOLVED | Noted: 2023-05-30 | Resolved: 2023-05-30

## 2023-05-30 PROBLEM — Z09 POSTOP CHECK: Status: ACTIVE | Noted: 2023-05-30

## 2023-05-30 PROBLEM — Z87.891 FORMER SMOKER: Status: ACTIVE | Noted: 2023-05-30

## 2023-05-30 PROBLEM — Z86.79 HISTORY OF HYPERTENSION: Status: RESOLVED | Noted: 2023-05-30 | Resolved: 2023-05-30

## 2023-05-30 PROBLEM — Z86.73 HISTORY OF CVA (CEREBROVASCULAR ACCIDENT): Status: RESOLVED | Noted: 2023-05-30 | Resolved: 2023-05-30

## 2023-05-30 PROBLEM — G30.9 ALZHEIMER DISEASE: Status: RESOLVED | Noted: 2023-05-30 | Resolved: 2023-05-30

## 2023-05-30 LAB
ALBUMIN SERPL ELPH-MCNC: 3.6 G/DL — SIGNIFICANT CHANGE UP (ref 3.3–5)
ALP SERPL-CCNC: 82 U/L — SIGNIFICANT CHANGE UP (ref 40–120)
ALT FLD-CCNC: 17 U/L — SIGNIFICANT CHANGE UP (ref 10–45)
ANION GAP SERPL CALC-SCNC: 13 MMOL/L — SIGNIFICANT CHANGE UP (ref 5–17)
AST SERPL-CCNC: 19 U/L — SIGNIFICANT CHANGE UP (ref 10–40)
BILIRUB SERPL-MCNC: 0.7 MG/DL — SIGNIFICANT CHANGE UP (ref 0.2–1.2)
BUN SERPL-MCNC: 25 MG/DL — HIGH (ref 7–23)
CALCIUM SERPL-MCNC: 9.1 MG/DL — SIGNIFICANT CHANGE UP (ref 8.4–10.5)
CHLORIDE SERPL-SCNC: 104 MMOL/L — SIGNIFICANT CHANGE UP (ref 96–108)
CO2 SERPL-SCNC: 24 MMOL/L — SIGNIFICANT CHANGE UP (ref 22–31)
CREAT SERPL-MCNC: 1.16 MG/DL — SIGNIFICANT CHANGE UP (ref 0.5–1.3)
EGFR: 67 ML/MIN/1.73M2 — SIGNIFICANT CHANGE UP
GLUCOSE BLDC GLUCOMTR-MCNC: 136 MG/DL — HIGH (ref 70–99)
GLUCOSE BLDC GLUCOMTR-MCNC: 193 MG/DL — HIGH (ref 70–99)
GLUCOSE BLDC GLUCOMTR-MCNC: 194 MG/DL — HIGH (ref 70–99)
GLUCOSE BLDC GLUCOMTR-MCNC: 237 MG/DL — HIGH (ref 70–99)
GLUCOSE SERPL-MCNC: 145 MG/DL — HIGH (ref 70–99)
HCT VFR BLD CALC: 31.3 % — LOW (ref 39–50)
HGB BLD-MCNC: 10.2 G/DL — LOW (ref 13–17)
MAGNESIUM SERPL-MCNC: 2 MG/DL — SIGNIFICANT CHANGE UP (ref 1.6–2.6)
MCHC RBC-ENTMCNC: 29.7 PG — SIGNIFICANT CHANGE UP (ref 27–34)
MCHC RBC-ENTMCNC: 32.6 GM/DL — SIGNIFICANT CHANGE UP (ref 32–36)
MCV RBC AUTO: 91 FL — SIGNIFICANT CHANGE UP (ref 80–100)
NRBC # BLD: 0 /100 WBCS — SIGNIFICANT CHANGE UP (ref 0–0)
PLATELET # BLD AUTO: 281 K/UL — SIGNIFICANT CHANGE UP (ref 150–400)
POTASSIUM SERPL-MCNC: 4.1 MMOL/L — SIGNIFICANT CHANGE UP (ref 3.5–5.3)
POTASSIUM SERPL-SCNC: 4.1 MMOL/L — SIGNIFICANT CHANGE UP (ref 3.5–5.3)
PROT SERPL-MCNC: 6.4 G/DL — SIGNIFICANT CHANGE UP (ref 6–8.3)
RBC # BLD: 3.44 M/UL — LOW (ref 4.2–5.8)
RBC # FLD: 14.2 % — SIGNIFICANT CHANGE UP (ref 10.3–14.5)
SODIUM SERPL-SCNC: 141 MMOL/L — SIGNIFICANT CHANGE UP (ref 135–145)
WBC # BLD: 10.13 K/UL — SIGNIFICANT CHANGE UP (ref 3.8–10.5)
WBC # FLD AUTO: 10.13 K/UL — SIGNIFICANT CHANGE UP (ref 3.8–10.5)

## 2023-05-30 PROCEDURE — 99232 SBSQ HOSP IP/OBS MODERATE 35: CPT | Mod: GC

## 2023-05-30 PROCEDURE — 71045 X-RAY EXAM CHEST 1 VIEW: CPT | Mod: 26

## 2023-05-30 RX ORDER — INSULIN LISPRO 100/ML
10 VIAL (ML) SUBCUTANEOUS
Refills: 0 | Status: DISCONTINUED | OUTPATIENT
Start: 2023-05-30 | End: 2023-05-31

## 2023-05-30 RX ADMIN — Medication 10 UNIT(S): at 17:00

## 2023-05-30 RX ADMIN — OXYCODONE HYDROCHLORIDE 5 MILLIGRAM(S): 5 TABLET ORAL at 19:12

## 2023-05-30 RX ADMIN — Medication 600 MILLIGRAM(S): at 17:07

## 2023-05-30 RX ADMIN — PANTOPRAZOLE SODIUM 40 MILLIGRAM(S): 20 TABLET, DELAYED RELEASE ORAL at 12:10

## 2023-05-30 RX ADMIN — APIXABAN 5 MILLIGRAM(S): 2.5 TABLET, FILM COATED ORAL at 05:20

## 2023-05-30 RX ADMIN — OXYCODONE HYDROCHLORIDE 5 MILLIGRAM(S): 5 TABLET ORAL at 06:21

## 2023-05-30 RX ADMIN — INSULIN GLARGINE 38 UNIT(S): 100 INJECTION, SOLUTION SUBCUTANEOUS at 21:43

## 2023-05-30 RX ADMIN — Medication 650 MILLIGRAM(S): at 19:10

## 2023-05-30 RX ADMIN — SODIUM CHLORIDE 3 MILLILITER(S): 9 INJECTION INTRAMUSCULAR; INTRAVENOUS; SUBCUTANEOUS at 05:15

## 2023-05-30 RX ADMIN — Medication 650 MILLIGRAM(S): at 16:58

## 2023-05-30 RX ADMIN — ATORVASTATIN CALCIUM 40 MILLIGRAM(S): 80 TABLET, FILM COATED ORAL at 21:44

## 2023-05-30 RX ADMIN — SODIUM CHLORIDE 3 MILLILITER(S): 9 INJECTION INTRAMUSCULAR; INTRAVENOUS; SUBCUTANEOUS at 21:21

## 2023-05-30 RX ADMIN — AMIODARONE HYDROCHLORIDE 200 MILLIGRAM(S): 400 TABLET ORAL at 05:19

## 2023-05-30 RX ADMIN — POLYETHYLENE GLYCOL 3350 17 GRAM(S): 17 POWDER, FOR SOLUTION ORAL at 12:10

## 2023-05-30 RX ADMIN — Medication 650 MILLIGRAM(S): at 08:38

## 2023-05-30 RX ADMIN — SENNA PLUS 2 TABLET(S): 8.6 TABLET ORAL at 21:44

## 2023-05-30 RX ADMIN — Medication 10 MILLIEQUIVALENT(S): at 12:14

## 2023-05-30 RX ADMIN — Medication 2: at 16:59

## 2023-05-30 RX ADMIN — Medication 4: at 21:42

## 2023-05-30 RX ADMIN — Medication 7 UNIT(S): at 12:08

## 2023-05-30 RX ADMIN — Medication 2: at 12:07

## 2023-05-30 RX ADMIN — OXYCODONE HYDROCHLORIDE 10 MILLIGRAM(S): 5 TABLET ORAL at 12:10

## 2023-05-30 RX ADMIN — Medication 50 MILLIGRAM(S): at 05:20

## 2023-05-30 RX ADMIN — Medication 7 UNIT(S): at 06:51

## 2023-05-30 RX ADMIN — Medication 20 MILLIGRAM(S): at 05:19

## 2023-05-30 RX ADMIN — Medication 600 MILLIGRAM(S): at 05:19

## 2023-05-30 RX ADMIN — OXYCODONE HYDROCHLORIDE 10 MILLIGRAM(S): 5 TABLET ORAL at 13:42

## 2023-05-30 RX ADMIN — Medication 50 MILLIGRAM(S): at 17:07

## 2023-05-30 RX ADMIN — Medication 81 MILLIGRAM(S): at 12:09

## 2023-05-30 RX ADMIN — SODIUM CHLORIDE 3 MILLILITER(S): 9 INJECTION INTRAMUSCULAR; INTRAVENOUS; SUBCUTANEOUS at 15:12

## 2023-05-30 RX ADMIN — OXYCODONE HYDROCHLORIDE 5 MILLIGRAM(S): 5 TABLET ORAL at 19:42

## 2023-05-30 RX ADMIN — OXYCODONE HYDROCHLORIDE 5 MILLIGRAM(S): 5 TABLET ORAL at 05:51

## 2023-05-30 RX ADMIN — APIXABAN 5 MILLIGRAM(S): 2.5 TABLET, FILM COATED ORAL at 17:07

## 2023-05-30 RX ADMIN — Medication 650 MILLIGRAM(S): at 09:38

## 2023-05-30 NOTE — PROGRESS NOTE ADULT - ATTENDING COMMENTS
Pt seen on rounds this afternoon and events of the weekend reviewed.  Pt remains alert but cognitively impaired, unable to provide diet history which might help explain some of the glycemic fluctuations.    Glucoses were mostly in the 200 range yesterday on 38 Lantus qhs plus 7 units premeal, today decreased to 136/193.  Was breathing comfortably, though had crackles at the left base on exam.  LEs without noticeable edema  --With PO intake overall improved and post-prandial glucoses generally elevated, increase the premeal lispro to 10 units  --AM fingerstick was down to 136 today--continue Lantus at 38 units for now.  --Need to change his enteral supplement to either Ensure Max or Glucerna
Pt seen on rounds this afternoon.  Wife was again at the bedside, concerned about the pt's poor PO intake--though he did seem to be doing a somewhat better job with supper  Glucoses were quite high overnight--261 at 10 PM (given 24 Lantus plus 6 units coverage), 319 this morning.  Was given a single dose of 12 NPH late this morning to supplement basal insulin.  FS was down to 225 at supper  --Will increase the Lantus to 38 units  --Increase the premeal slightly to 7 units lispro (since he does seem to be doing a better job with supper than with his previous meals)

## 2023-05-30 NOTE — PROGRESS NOTE ADULT - ASSESSMENT
Assessment:   71yo M with past medical history significant for HTN, HLD, prior tobacco use, hx of CVA and Alzheimers disease.     He was transferred to our facility for surgical evaluation from [facility] in the setting NSTEMI to [facility] and transferred to [facility] for CABG. On 5/24/23 pt underwent a OPCAB x 3 LIMA-LAD, SVG- OM-PDA  EF 55% with Dr. Calloway. Uneventful intraoperative course. Extubated early AM POD 1. On low dose jose alberto, weaned off with volume. Overnight POD 1-2 Afib RVR s/p amio boluses, PO load, and uptitrated beta blocker. POD2 pleural chest tube removed and transferred to [facility] for further care. POD3 pt is feeling well. PW removed and 1 yamilka remains. Pushing ambulation and IS use. Small increase in Cr, hydrating. POD5 patient with some congestion, encouraged IS and pulmonary toilet, given lasix.    Plan:    Neurovascular:   -Pain well controlled with current regimen     - PRN's: acetaminophen, oxycodone 5-10mg   -hx CVA 2016    - no residual deficits   -hx of Alzheimers disease     Cardiovascular:   -POD5 OPCABx3 (LIMA-LAD, SVG-OM-PDA), EF 55%     -continue with asa, statin (Plavix discontinued yesterday)     -postoperative AF       -was in NSR, back in AF 5/28 to 110s, now back to NSR.      -started eliquis this admission        -continue with Amio, metoprolol tartrate 12.5mg Q6 hours  -Hemodynamically stable.   -Monitor: BP, HR, tele    Respiratory:   -Oxygenating well on room air  -Encourage continued use of IS 10x/hr and frequent ambulation  -CXR: stable     GI:  -GI PPX: protonix   -PO Diet  -Bowel Regimen: senna/miralax     Renal / :  -Continue to monitor renal function: BUN/Cr  -Monitor I/O's daily     Endocrine:    -DM      -A1c: 10.2     -on lantus 38, lispro 7, RISS   -TSH: 1.2    Hematologic:  - no overt s/s of active bleeding   -CBC: H/H-  -Coagulation Panel.    ID:  -Temperature:   - afebrile   -CBC: WBC-WNL  -Continue to observe for SIRS/Sepsis Syndrome.    Prophylaxis:  -DVT prophylaxis with 5000 SubQ Heparin q8h.  -Continue with SCD's b/l while patient is at rest     Disposition:  -Pending [facility] placement   Assessment:   71yo M with past medical history significant for HTN, HLD, prior tobacco use, hx of CVA and Alzheimers disease.     He was transferred to our facility for surgical evaluation from [facility] in the setting NSTEMI.   5/24/23 he underwent uncomplicated OPCAB x 3 LIMA-LAD, SVG- OM-PDA with Dr. Calloway. Uneventful intraoperative course. Extubated early AM POD 1 and weaned off pressor support. POD #2 developed AF RVR s/p amio boluses to PO load, and uptitration of BB therapy. CT removed and transferred to floor.     POD #3 pacing wire removed.   POD #5 POD5 given lasix secondary to increased congestion on CXR. Abimael removed.     Plan:    Neurovascular:   -Pain well controlled with current regimen     - PRN's: acetaminophen, oxycodone 5-10mg   -hx CVA 2016    - no residual deficits   -hx of Alzheimers disease     Cardiovascular:   -POD5 OPCABx3 (LIMA-LAD, SVG-OM-PDA), EF 55%     -continue with asa, statin (Plavix discontinued yesterday)     -postoperative AF       -was in NSR, back in AF 5/28 to 110s, now back to NSR.      -started eliquis this admission        -continue with Amio, metoprolol tartrate 12.5mg Q6 hours  -Hemodynamically stable.   -Monitor: BP, HR, tele    Respiratory:   -Oxygenating well on room air  -Encourage continued use of IS 10x/hr and frequent ambulation  -CXR: stable     GI:  -GI PPX: protonix   -PO Diet  -Bowel Regimen: senna/miralax     Renal / :  -Continue to monitor renal function: BUN/Cr  -Monitor I/O's daily     Endocrine:    -DM      -A1c: 10.2     -on lantus 38, lispro 7, RISS   -TSH: 1.2    Hematologic:  - no overt s/s of active bleeding   -CBC: H/H-  -Coagulation Panel.    ID:  -Temperature:   - afebrile   -CBC: WBC-WNL  -Continue to observe for SIRS/Sepsis Syndrome.    Prophylaxis:  -DVT prophylaxis with 5000 SubQ Heparin q8h.  -Continue with SCD's b/l while patient is at rest     Disposition:  -Pending [facility] placement   Assessment:   71yo M with past medical history significant for HTN, HLD, prior tobacco use, hx of CVA and Alzheimers disease. He was transferred to our facility for surgical evaluation from [facility] in the setting NSTEMI. 5/24/23 he underwent uncomplicated OPCAB x 3 LIMA-LAD, SVG- OM-PDA with Dr. Calloway.  Extubated POD #1 and weaned off pressor support. POD #2 developed AF RVR s/p amio boluses to PO load, and uptitration of BB therapy. Pleural CT removed and he was transferred to floor. POD #3 pacing wire removed. POD #5 given lasix secondary to increased congestion on CXR. Abimael removed. Started on eliquis. Awaiting discharge to [facility].    Plan:    Neurovascular:   -Pain well controlled with current regimen     - PRN's: acetaminophen, oxycodone 5-10mg   -hx CVA 2016    - no residual deficits   -hx of Alzheimers disease     Cardiovascular:   -POD5 OPCABx3 (LIMA-LAD, SVG-OM-PDA), EF 55%     -continue with asa, statin (Plavix discontinued yesterday)     -postoperative AF       -continue amio load and metoprolol 12.5mg Q6hr      -eliquis started this admission       -currently in NSR  -Hemodynamically stable.   -Monitor: BP, HR, tele    Respiratory:   -Oxygenating well on room air  -Encourage continued use of IS 10x/hr and frequent ambulation  -CXR: stable     GI:  -GI PPX: protonix   -PO Diet  -Bowel Regimen: senna/miralax     Renal / :  -Continue to monitor renal function: BUN/Cr: 25/1.16  -Monitor I/O's daily     Endocrine:    -DM      -A1c: 10.2     -on lantus 38, lispro 7, RISS   -TSH: 1.2    Hematologic:  - no overt s/s of active bleeding   -CBC: H/H- 10.2/31.3  -Coagulation Panel.    ID:  -Temperature:   - afebrile   -CBC: WBC-WNL  -Continue to observe for SIRS/Sepsis Syndrome.    Prophylaxis:  -DVT prophylaxis with 5000 SubQ Heparin q8h.  -Continue with SCD's b/l while patient is at rest     Disposition:  -Pending [facility] placement   Assessment:   73yo M with past medical history significant for HTN, HLD, prior tobacco use, hx of CVA and Alzheimers disease. He was transferred to our facility from [facility] for surgical evaluation in the setting of NSTEMI. 5/24/23 he underwent uncomplicated OPCAB x 3 LIMA-LAD, SVG- OM-PDA with Dr. Calloway.  Extubated POD #1 and weaned off pressor support. POD #2 developed AF RVR s/p amio boluses to PO load, and uptitration of BB therapy. Pleural CT removed and he was transferred to floor. POD #3 pacing wire removed. POD #5 given lasix secondary to increased congestion on CXR. Abimael removed. Started on eliquis. Awaiting discharge to [facility].    Plan:    Neurovascular:   -Pain well controlled with current regimen     - PRN's: tylenol, oxy5  -hx CVA 2016    - no residual deficits   -hx of Alzheimers disease     Cardiovascular:   -POD#6 OPCABx3 (LIMA-LAD, SVG-OM-PDA)    -continue with asa, statin (Plavix discontinued yesterday)     -postoperative AF       -continue amio load and metoprolol 12.5mg Q6hr      -eliquis started yesterday       -currently in NSR  -Hemodynamically stable.   -Monitor: BP, HR, tele    Respiratory:   -Oxygenating well on room air  -Encourage continued use of IS 10x/hr and frequent ambulation  -CXR: stable     GI:  -GI PPX: protonix   -PO Diet  -Bowel Regimen: senna/miralax     Renal / :  -Continue to monitor renal function: BUN/Cr: 25/1.16  -Monitor I/O's daily     Endocrine:    -DM      -A1c: 10.2     -on lantus 38, lispro 7, RISS   -TSH: 1.2    Hematologic:  - no overt s/s of active bleeding   -CBC: H/H- 10.2/31.3  -Coagulation Panel.    ID:  -Temperature:   - afebrile   -CBC: WBC-WNL  -Continue to observe for SIRS/Sepsis Syndrome.    Prophylaxis:  -DVT prophylaxis with 5000 SubQ Heparin q8h.  -Continue with SCD's b/l while patient is at rest     Disposition:  -Pending [facility] placement

## 2023-05-30 NOTE — PROGRESS NOTE ADULT - SUBJECTIVE AND OBJECTIVE BOX
SUBJECTIVE / INTERVAL HPI: Patient was seen and examined this morning.     CAPILLARY BLOOD GLUCOSE & INSULIN RECEIVED  135 mg/dL (05-28 @ 21:18) ? 38 units of lantus.   248 mg/dL (05-29 @ 06:34) ? 7 units of lispro + 4 units of lispro sliding scale.   226 mg/dL (05-29 @ 11:18) ? 7 units of lispro + 4 units of lispro sliding scale.   203 mg/dL (05-29 @ 16:08) ? 7 units of lispro + 4 units of lispro sliding scale.   277 mg/dL (05-29 @ 21:11) ? 38 units of lantus + 6 units of lispro sliding scale.   181 mg/dL (05-29 @ 21:33) ? Ø  136 mg/dL (05-30 @ 06:43) ? 7 units of lispro.   193 mg/dL (05-30 @ 11:56) ? 7 units of lispro + 2 units of lispro sliding scale.     REVIEW OF SYSTEMS  Constitutional:  Negative fever, chills or loss of appetite.  Eyes:  Negative blurry vision or double vision.  Cardiovascular:  Negative for chest pain or palpitations.  Respiratory:  Negative for cough, wheezing, or shortness of breath.    Gastrointestinal:  Negative for nausea, vomiting, diarrhea, constipation, or abdominal pain.  Genitourinary:  Negative frequency, urgency or dysuria.  Neurologic:  No headache, confusion, dizziness, lightheadedness.    PHYSICAL EXAM  Vital Signs Last 24 Hrs  T(C): 36.6 (30 May 2023 17:33), Max: 36.6 (30 May 2023 17:33)  T(F): 97.9 (30 May 2023 17:33), Max: 97.9 (30 May 2023 17:33)  HR: 76 (30 May 2023 17:00) (62 - 80)  BP: 142/76 (30 May 2023 17:00) (116/55 - 154/69)  BP(mean): 100 (30 May 2023 17:00) (79 - 100)  RR: 18 (30 May 2023 17:00) (16 - 18)  SpO2: 97% (30 May 2023 17:00) (93% - 99%)    Parameters below as of 30 May 2023 17:00  Patient On (Oxygen Delivery Method): room air    Constitutional: Awake, alert, in no acute distress.   HEENT: Normocephalic, atraumatic, MARSHALL.  Respiratory: Lungs clear to ausculation bilaterally.   Cardiovascular: regular rhythm, normal S1 and S2, no audible murmurs.   GI: soft, non-tender, non-distended, bowel sounds present.  Extremities: No lower extremity edema.  Psychiatric: AAO x 3. Normal affect/mood.     LABS  CBC - WBC/HGB/HTC/PLT: 10.13/10.2/31.3/281 (05-30-23)  BMP - Na/K/Cl/Bicarb/BUN/Cr/Gluc/AG/eGFR: 141/4.1/104/24/25/1.16/145/13/67 (05-30-23)  Ca - 9.1 (05-30-23)  Phos - -- (05-30-23)  Mg - 2.0 (05-30-23)  LFT - Alb/Tprot/Tbili/Dbili/AlkPhos/ALT/AST: 3.6/--/0.7/--/82/17/19 (05-30-23)  PT/aPTT/INR: 14.5/32.6/1.22 (05-29-23)   Thyroid Stimulating Hormone, Serum: 1.200 (05-20-23)    MEDICATIONS  MEDICATIONS  (STANDING):  aMIOdarone    Tablet 200 milliGRAM(s) Oral daily  aMIOdarone    Tablet   Oral   apixaban 5 milliGRAM(s) Oral every 12 hours  aspirin enteric coated 81 milliGRAM(s) Oral daily  atorvastatin 40 milliGRAM(s) Oral at bedtime  dextrose 5%. 1000 milliLiter(s) (50 mL/Hr) IV Continuous <Continuous>  dextrose 5%. 1000 milliLiter(s) (100 mL/Hr) IV Continuous <Continuous>  dextrose 50% Injectable 50 milliLiter(s) IV Push every 15 minutes  dextrose 50% Injectable 25 milliLiter(s) IV Push every 15 minutes  furosemide    Tablet 20 milliGRAM(s) Oral daily  glucagon  Injectable 1 milliGRAM(s) IntraMuscular once  guaiFENesin  milliGRAM(s) Oral every 12 hours  insulin glargine Injectable (LANTUS) 38 Unit(s) SubCutaneous at bedtime  insulin lispro (ADMELOG) corrective regimen sliding scale   SubCutaneous Before meals and at bedtime  insulin lispro Injectable (ADMELOG) 10 Unit(s) SubCutaneous three times a day before meals  metoprolol tartrate 50 milliGRAM(s) Oral every 12 hours  pantoprazole    Tablet 40 milliGRAM(s) Oral daily  polyethylene glycol 3350 17 Gram(s) Oral daily  potassium chloride    Tablet ER 10 milliEquivalent(s) Oral daily  senna 2 Tablet(s) Oral at bedtime  sodium chloride 0.9% lock flush 3 milliLiter(s) IV Push every 8 hours  sodium chloride 0.9%. 1000 milliLiter(s) (10 mL/Hr) IV Continuous <Continuous>    MEDICATIONS  (PRN):  acetaminophen     Tablet .. 650 milliGRAM(s) Oral every 6 hours PRN Mild Pain (1 - 3)  dextrose Oral Gel 15 Gram(s) Oral once PRN Blood Glucose LESS THAN 70 milliGRAM(s)/deciliter  oxyCODONE    IR 10 milliGRAM(s) Oral every 6 hours PRN Severe Pain (7 - 10)  oxyCODONE    IR 5 milliGRAM(s) Oral every 6 hours PRN Moderate Pain (4 - 6)    ASSESSMENT / RECOMMENDATIONS  Mr. Fox is a 65-year-old male with a past medical history of type 2 diabetes mellitus, hypertension, hyperlipidemia, CVA (2016, no residual deficits) and Alzheimer's dementia who presented to [facility] emergency department complaining of chest pain. He was found to have NSTEMI and underwent cardiac cath revealing coronary artery disease. He was then transferred to [facility] for further management, now s/p CABG (5/24/2023). Endocrinology following for recommendations regarding diabetes management.     A1C: 10.2 %  BUN: 25  Creatinine: 1.16  GFR: 67  Weight: 69.4  BMI: 26.2  EF:     # Type 2 diabetes mellitus with hyperglycemia  - Please continue lantus *** units at bedtime.   - Continue lispro *** units before each meal.  - Continue lispro moderate / low dose sliding scale before meals and at bedtime.  - Patient's fingerstick glucose goal is 100-180 mg/dL.    - Discharge recommendations to be discussed.   - Patient can follow up at discharge with [facility] by calling [phone] to make an appointment.      Case discussed with Dr. Kennedy. Primary team updated.       Josue Ibarra    Endocrinology Fellow    Service Pager: [phone]    SUBJECTIVE / INTERVAL HPI: Patient was seen and examined this morning. He was unable to provide diet history. Patient's wife was not at bedside to provide collateral information. Blood glucose levels have improved compared to yesterday, but he continues to experience post-prandial hyperglycemia. Also, found that patient had regular ensure at bedside which might be contributing to hyperglycemia seen yesterday.     CAPILLARY BLOOD GLUCOSE & INSULIN RECEIVED  135 mg/dL (05-28 @ 21:18) ? 38 units of lantus.   248 mg/dL (05-29 @ 06:34) ? 7 units of lispro + 4 units of lispro sliding scale.   226 mg/dL (05-29 @ 11:18) ? 7 units of lispro + 4 units of lispro sliding scale.   203 mg/dL (05-29 @ 16:08) ? 7 units of lispro + 4 units of lispro sliding scale.   277 mg/dL (05-29 @ 21:11) ? 38 units of lantus + 6 units of lispro sliding scale.   181 mg/dL (05-29 @ 21:33) ? Ø  136 mg/dL (05-30 @ 06:43) ? 7 units of lispro.   193 mg/dL (05-30 @ 11:56) ? 7 units of lispro + 2 units of lispro sliding scale.     REVIEW OF SYSTEMS  Constitutional:  (+) Decreased appetite. Negative fever, chills.   Cardiovascular:  Negative for chest pain or palpitations.  Respiratory:  Negative for cough, wheezing, or shortness of breath.    Gastrointestinal:  Negative for nausea, vomiting, diarrhea, constipation, or abdominal pain.  Neurologic:  (+) Impaired memory. No headache, dizziness, lightheadedness.    PHYSICAL EXAM  Vital Signs Last 24 Hrs  T(C): 36.6 (30 May 2023 17:33), Max: 36.6 (30 May 2023 17:33)  T(F): 97.9 (30 May 2023 17:33), Max: 97.9 (30 May 2023 17:33)  HR: 76 (30 May 2023 17:00) (62 - 80)  BP: 142/76 (30 May 2023 17:00) (116/55 - 154/69)  BP(mean): 100 (30 May 2023 17:00) (79 - 100)  RR: 18 (30 May 2023 17:00) (16 - 18)  SpO2: 97% (30 May 2023 17:00) (93% - 99%)    Parameters below as of 30 May 2023 17:00  Patient On (Oxygen Delivery Method): room air    Constitutional: Awake, alert, in no acute distress.   HEENT: Normocephalic, atraumatic, MARSHALL.  Respiratory: Lungs clear to ausculation bilaterally.   Cardiovascular: regular rhythm, normal S1 and S2, no audible murmurs. (+) sternotomy wound.   GI: soft, non-tender, non-distended, bowel sounds present.  Extremities: No lower extremity edema.  Psychiatric: AAO x 3. Normal affect/mood.     LABS  CBC - WBC/HGB/HTC/PLT: 10.13/10.2/31.3/281 (05-30-23)  BMP - Na/K/Cl/Bicarb/BUN/Cr/Gluc/AG/eGFR: 141/4.1/104/24/25/1.16/145/13/67 (05-30-23)  Ca - 9.1 (05-30-23)  Phos - -- (05-30-23)  Mg - 2.0 (05-30-23)  LFT - Alb/Tprot/Tbili/Dbili/AlkPhos/ALT/AST: 3.6/--/0.7/--/82/17/19 (05-30-23)  PT/aPTT/INR: 14.5/32.6/1.22 (05-29-23)   Thyroid Stimulating Hormone, Serum: 1.200 (05-20-23)    MEDICATIONS  MEDICATIONS  (STANDING):  aMIOdarone    Tablet 200 milliGRAM(s) Oral daily  aMIOdarone    Tablet   Oral   apixaban 5 milliGRAM(s) Oral every 12 hours  aspirin enteric coated 81 milliGRAM(s) Oral daily  atorvastatin 40 milliGRAM(s) Oral at bedtime  dextrose 5%. 1000 milliLiter(s) (50 mL/Hr) IV Continuous <Continuous>  dextrose 5%. 1000 milliLiter(s) (100 mL/Hr) IV Continuous <Continuous>  dextrose 50% Injectable 50 milliLiter(s) IV Push every 15 minutes  dextrose 50% Injectable 25 milliLiter(s) IV Push every 15 minutes  furosemide    Tablet 20 milliGRAM(s) Oral daily  glucagon  Injectable 1 milliGRAM(s) IntraMuscular once  guaiFENesin  milliGRAM(s) Oral every 12 hours  insulin glargine Injectable (LANTUS) 38 Unit(s) SubCutaneous at bedtime  insulin lispro (ADMELOG) corrective regimen sliding scale   SubCutaneous Before meals and at bedtime  insulin lispro Injectable (ADMELOG) 10 Unit(s) SubCutaneous three times a day before meals  metoprolol tartrate 50 milliGRAM(s) Oral every 12 hours  pantoprazole    Tablet 40 milliGRAM(s) Oral daily  polyethylene glycol 3350 17 Gram(s) Oral daily  potassium chloride    Tablet ER 10 milliEquivalent(s) Oral daily  senna 2 Tablet(s) Oral at bedtime  sodium chloride 0.9% lock flush 3 milliLiter(s) IV Push every 8 hours  sodium chloride 0.9%. 1000 milliLiter(s) (10 mL/Hr) IV Continuous <Continuous>    MEDICATIONS  (PRN):  acetaminophen     Tablet .. 650 milliGRAM(s) Oral every 6 hours PRN Mild Pain (1 - 3)  dextrose Oral Gel 15 Gram(s) Oral once PRN Blood Glucose LESS THAN 70 milliGRAM(s)/deciliter  oxyCODONE    IR 10 milliGRAM(s) Oral every 6 hours PRN Severe Pain (7 - 10)  oxyCODONE    IR 5 milliGRAM(s) Oral every 6 hours PRN Moderate Pain (4 - 6)    ASSESSMENT / RECOMMENDATIONS  Mr. Fox is a 65-year-old male with a past medical history of type 2 diabetes mellitus, hypertension, hyperlipidemia, CVA (2016, no residual deficits) and Alzheimer's dementia who presented to [facility] emergency department complaining of chest pain. He was found to have NSTEMI and underwent cardiac cath revealing coronary artery disease. He was then transferred to [facility] for further management, now s/p CABG (5/24/2023). Endocrinology following for recommendations regarding diabetes management.     A1C: 10.2 %  BUN: 25  Creatinine: 1.16  GFR: 67  Weight: 69.4  BMI: 26.2    # Type 2 diabetes mellitus with hyperglycemia  - Blood glucose levels have improved compared to yesterday, but he continues to experience post-prandial hyperglycemia. Also, found that patient had regular ensure at bedside which might be contributing to hyperglycemia seen yesterday.   - Please continue lantus 38 units at bedtime.   - INCREASE lispro to 10 units before each meal.  - Continue lispro moderate dose sliding scale before meals and at bedtime.  - CHANGE Ensure-plus supplement to Glucerna.   - Patient's fingerstick glucose goal is 100-180 mg/dL.    - Discharge recommendations to be discussed.   - Patient can follow up at discharge with [facility] by calling [phone] to make an appointment.      Case discussed with Dr. Kennedy. Primary team updated.       Josue Ibarra    Endocrinology Fellow    Service Pager: [phone]

## 2023-05-30 NOTE — PROGRESS NOTE ADULT - SUBJECTIVE AND OBJECTIVE BOX
Patient discussed on morning rounds with Dr. Hu.     OPERATION & DATE: 5/24/23 -- OPCABx3 (LIMA-LAD, SVG-OM-PDA), EF 55%    SUBJECTIVE ASSESSMENT: Seen this morning resting in bed in NAD. Denies active chest pain, SOB, dizziness, N/V/D.     VITAL SIGNS:  Vital Signs Last 24 Hrs  T(C): 36.1 (30 May 2023 10:52), Max: 36.9 (29 May 2023 13:47)  T(F): 97 (30 May 2023 10:52), Max: 98.5 (29 May 2023 13:47)  HR: 62 (30 May 2023 08:30) (62 - 72)  BP: 124/57 (30 May 2023 08:30) (120/60 - 148/67)  BP(mean): 82 (30 May 2023 08:30) (82 - 98)  RR: 18 (30 May 2023 08:30) (16 - 18)  SpO2: 99% (30 May 2023 08:30) (95% - 99%)    Parameters below as of 30 May 2023 08:30  Patient On (Oxygen Delivery Method): room air      I&O's Detail    29 May 2023 07:01  -  30 May 2023 07:00  --------------------------------------------------------  IN:    Oral Fluid: 820 mL  Total IN: 820 mL    OUT:    Voided (mL): 1450 mL  Total OUT: 1450 mL    Total NET: -630 mL    CHEST TUBE: n  LYNETTE DRAIN:  n  EPICARDIAL WIRES: n  STITCHES:n  STAPLES:n  AVILEZ: n  CENTRAL LINE:n  MIDLINE/PICC:n  WOUND VAC:n    PHYSICAL EXAM:  General: NAD, sitting comfortably in chair, conversing appropriately  Neurological: alert and oriented, UE and LE strength equal b/l, facial symmetry present  Cardiovascular: RRR, Clear S1 and S2, no murmurs appreciated  Respiratory: chest expansion symmetrical, CTA b/l, no wheezing noted  Gastrointestinal: +BS, soft, NT, ND  Extremities: moving spontaneously, no calf tenderness or edema.  Vascular: warm, well perfused. DP/PT pulses palpable b/l.  Incisions:     LABS:                        10.2   10.13 )-----------( 281      ( 30 May 2023 06:31 )             31.3     PT/INR - ( 29 May 2023 05:30 )   PT: 14.5 sec;   INR: 1.22          PTT - ( 29 May 2023 05:30 )  PTT:32.6 sec  05-30    141  |  104  |  25<H>  ----------------------------<  145<H>  4.1   |  24  |  1.16    Ca    9.1      30 May 2023 06:31  Mg     2.0     05-30    TPro  6.4  /  Alb  3.6  /  TBili  0.7  /  DBili  x   /  AST  19  /  ALT  17  /  AlkPhos  82  05-30      MEDICATIONS  (STANDING):  aMIOdarone    Tablet 200 milliGRAM(s) Oral daily  aMIOdarone    Tablet   Oral   apixaban 5 milliGRAM(s) Oral every 12 hours  aspirin enteric coated 81 milliGRAM(s) Oral daily  atorvastatin 40 milliGRAM(s) Oral at bedtime  dextrose 5%. 1000 milliLiter(s) (50 mL/Hr) IV Continuous <Continuous>  dextrose 5%. 1000 milliLiter(s) (100 mL/Hr) IV Continuous <Continuous>  dextrose 50% Injectable 50 milliLiter(s) IV Push every 15 minutes  dextrose 50% Injectable 25 milliLiter(s) IV Push every 15 minutes  furosemide    Tablet 20 milliGRAM(s) Oral daily  glucagon  Injectable 1 milliGRAM(s) IntraMuscular once  guaiFENesin  milliGRAM(s) Oral every 12 hours  insulin glargine Injectable (LANTUS) 38 Unit(s) SubCutaneous at bedtime  insulin lispro (ADMELOG) corrective regimen sliding scale   SubCutaneous Before meals and at bedtime  insulin lispro Injectable (ADMELOG) 7 Unit(s) SubCutaneous three times a day before meals  metoprolol tartrate 50 milliGRAM(s) Oral every 12 hours  pantoprazole    Tablet 40 milliGRAM(s) Oral daily  polyethylene glycol 3350 17 Gram(s) Oral daily  potassium chloride    Tablet ER 10 milliEquivalent(s) Oral daily  senna 2 Tablet(s) Oral at bedtime  sodium chloride 0.9% lock flush 3 milliLiter(s) IV Push every 8 hours  sodium chloride 0.9%. 1000 milliLiter(s) (10 mL/Hr) IV Continuous <Continuous>    MEDICATIONS  (PRN):  acetaminophen     Tablet .. 650 milliGRAM(s) Oral every 6 hours PRN Mild Pain (1 - 3)  dextrose Oral Gel 15 Gram(s) Oral once PRN Blood Glucose LESS THAN 70 milliGRAM(s)/deciliter  oxyCODONE    IR 10 milliGRAM(s) Oral every 6 hours PRN Severe Pain (7 - 10)  oxyCODONE    IR 5 milliGRAM(s) Oral every 6 hours PRN Moderate Pain (4 - 6)    RADIOLOGY & ADDITIONAL TESTS:     Patient discussed on morning rounds with Dr. Hu.     OPERATION & DATE: 5/24/23 -- OPCABx3 (LIMA-LAD, SVG-OM-PDA), EF 55%    SUBJECTIVE ASSESSMENT: Seen this morning resting in bed in NAD. Denies active chest pain, SOB, dizziness, N/V/D.     VITAL SIGNS:  Vital Signs Last 24 Hrs  T(C): 36.1 (30 May 2023 10:52), Max: 36.9 (29 May 2023 13:47)  T(F): 97 (30 May 2023 10:52), Max: 98.5 (29 May 2023 13:47)  HR: 62 (30 May 2023 08:30) (62 - 72)  BP: 124/57 (30 May 2023 08:30) (120/60 - 148/67)  BP(mean): 82 (30 May 2023 08:30) (82 - 98)  RR: 18 (30 May 2023 08:30) (16 - 18)  SpO2: 99% (30 May 2023 08:30) (95% - 99%)    Parameters below as of 30 May 2023 08:30  Patient On (Oxygen Delivery Method): room air      I&O's Detail    29 May 2023 07:01  -  30 May 2023 07:00  --------------------------------------------------------  IN:    Oral Fluid: 820 mL  Total IN: 820 mL    OUT:    Voided (mL): 1450 mL  Total OUT: 1450 mL    Total NET: -630 mL    CHEST TUBE: n  LYNETTE DRAIN:  n  EPICARDIAL WIRES: n  STITCHES:n  STAPLES:n  AVILEZ: n  CENTRAL LINE:n  MIDLINE/PICC:n  WOUND VAC:n    PHYSICAL EXAM:  General: NAD, sitting comfortably in chair, conversing appropriately  Neurological: alert and oriented, UE and LE strength equal b/l, facial symmetry present  Cardiovascular: RRR, Clear S1 and S2, no murmurs appreciated  Respiratory: chest expansion symmetrical, CTA b/l, no wheezing noted  Gastrointestinal: +BS, soft, NT, ND  Extremities: moving spontaneously, no calf tenderness or edema.  Vascular: warm, well perfused. DP/PT pulses palpable b/l.  Incisions: sternotomy healing well no erythema, purulence or ecchymosis. EVH site healing well no erythema, purulence or ecchymosis.     LABS:                        10.2   10.13 )-----------( 281      ( 30 May 2023 06:31 )             31.3     PT/INR - ( 29 May 2023 05:30 )   PT: 14.5 sec;   INR: 1.22          PTT - ( 29 May 2023 05:30 )  PTT:32.6 sec  05-30    141  |  104  |  25<H>  ----------------------------<  145<H>  4.1   |  24  |  1.16    Ca    9.1      30 May 2023 06:31  Mg     2.0     05-30    TPro  6.4  /  Alb  3.6  /  TBili  0.7  /  DBili  x   /  AST  19  /  ALT  17  /  AlkPhos  82  05-30      MEDICATIONS  (STANDING):  aMIOdarone    Tablet 200 milliGRAM(s) Oral daily  aMIOdarone    Tablet   Oral   apixaban 5 milliGRAM(s) Oral every 12 hours  aspirin enteric coated 81 milliGRAM(s) Oral daily  atorvastatin 40 milliGRAM(s) Oral at bedtime  dextrose 5%. 1000 milliLiter(s) (50 mL/Hr) IV Continuous <Continuous>  dextrose 5%. 1000 milliLiter(s) (100 mL/Hr) IV Continuous <Continuous>  dextrose 50% Injectable 50 milliLiter(s) IV Push every 15 minutes  dextrose 50% Injectable 25 milliLiter(s) IV Push every 15 minutes  furosemide    Tablet 20 milliGRAM(s) Oral daily  glucagon  Injectable 1 milliGRAM(s) IntraMuscular once  guaiFENesin  milliGRAM(s) Oral every 12 hours  insulin glargine Injectable (LANTUS) 38 Unit(s) SubCutaneous at bedtime  insulin lispro (ADMELOG) corrective regimen sliding scale   SubCutaneous Before meals and at bedtime  insulin lispro Injectable (ADMELOG) 7 Unit(s) SubCutaneous three times a day before meals  metoprolol tartrate 50 milliGRAM(s) Oral every 12 hours  pantoprazole    Tablet 40 milliGRAM(s) Oral daily  polyethylene glycol 3350 17 Gram(s) Oral daily  potassium chloride    Tablet ER 10 milliEquivalent(s) Oral daily  senna 2 Tablet(s) Oral at bedtime  sodium chloride 0.9% lock flush 3 milliLiter(s) IV Push every 8 hours  sodium chloride 0.9%. 1000 milliLiter(s) (10 mL/Hr) IV Continuous <Continuous>    MEDICATIONS  (PRN):  acetaminophen     Tablet .. 650 milliGRAM(s) Oral every 6 hours PRN Mild Pain (1 - 3)  dextrose Oral Gel 15 Gram(s) Oral once PRN Blood Glucose LESS THAN 70 milliGRAM(s)/deciliter  oxyCODONE    IR 10 milliGRAM(s) Oral every 6 hours PRN Severe Pain (7 - 10)  oxyCODONE    IR 5 milliGRAM(s) Oral every 6 hours PRN Moderate Pain (4 - 6)    RADIOLOGY & ADDITIONAL TESTS:     Patient discussed on morning rounds with Dr. Hu.     OPERATION & DATE: 5/24/23 -- OPCABx3 (LIMA-LAD, SVG-OM-PDA), EF 55%    SUBJECTIVE ASSESSMENT: Seen this morning resting in bed in NAD. Denies active chest pain, SOB, dizziness, N/V/D.     VITAL SIGNS:  Vital Signs Last 24 Hrs  T(C): 36.1 (30 May 2023 10:52), Max: 36.9 (29 May 2023 13:47)  T(F): 97 (30 May 2023 10:52), Max: 98.5 (29 May 2023 13:47)  HR: 62 (30 May 2023 08:30) (62 - 72)  BP: 124/57 (30 May 2023 08:30) (120/60 - 148/67)  BP(mean): 82 (30 May 2023 08:30) (82 - 98)  RR: 18 (30 May 2023 08:30) (16 - 18)  SpO2: 99% (30 May 2023 08:30) (95% - 99%)    Parameters below as of 30 May 2023 08:30  Patient On (Oxygen Delivery Method): room air      I&O's Detail    29 May 2023 07:01  -  30 May 2023 07:00  --------------------------------------------------------  IN:    Oral Fluid: 820 mL  Total IN: 820 mL    OUT:    Voided (mL): 1450 mL  Total OUT: 1450 mL    Total NET: -630 mL    CHEST TUBE: n  LYNETTE DRAIN:  n  EPICARDIAL WIRES: n  STITCHES:n  STAPLES:n  AVILEZ: n  CENTRAL LINE:n  MIDLINE/PICC:n  WOUND VAC:n    PHYSICAL EXAM:  General: NAD, sitting comfortably in chair, conversing appropriately  Neurological: alert and oriented, UE and LE strength equal b/l, facial symmetry present  Cardiovascular: RRR, Clear S1 and S2, no murmurs appreciated  Respiratory: chest expansion symmetrical, CTA b/l, no wheezing noted  Gastrointestinal: +BS, soft, NT, ND  Extremities: moving spontaneously, no calf tenderness or edema.  Vascular: warm, well perfused. DP/PT pulses palpable b/l.  Incisions: sternotomy healing well no erythema, purulence or ecchymosis. EVH site healing well no erythema, purulence or ecchymosis, 2 tie downs in place     LABS:                        10.2   10.13 )-----------( 281      ( 30 May 2023 06:31 )             31.3     PT/INR - ( 29 May 2023 05:30 )   PT: 14.5 sec;   INR: 1.22          PTT - ( 29 May 2023 05:30 )  PTT:32.6 sec  05-30    141  |  104  |  25<H>  ----------------------------<  145<H>  4.1   |  24  |  1.16    Ca    9.1      30 May 2023 06:31  Mg     2.0     05-30    TPro  6.4  /  Alb  3.6  /  TBili  0.7  /  DBili  x   /  AST  19  /  ALT  17  /  AlkPhos  82  05-30      MEDICATIONS  (STANDING):  aMIOdarone    Tablet 200 milliGRAM(s) Oral daily  aMIOdarone    Tablet   Oral   apixaban 5 milliGRAM(s) Oral every 12 hours  aspirin enteric coated 81 milliGRAM(s) Oral daily  atorvastatin 40 milliGRAM(s) Oral at bedtime  dextrose 5%. 1000 milliLiter(s) (50 mL/Hr) IV Continuous <Continuous>  dextrose 5%. 1000 milliLiter(s) (100 mL/Hr) IV Continuous <Continuous>  dextrose 50% Injectable 50 milliLiter(s) IV Push every 15 minutes  dextrose 50% Injectable 25 milliLiter(s) IV Push every 15 minutes  furosemide    Tablet 20 milliGRAM(s) Oral daily  glucagon  Injectable 1 milliGRAM(s) IntraMuscular once  guaiFENesin  milliGRAM(s) Oral every 12 hours  insulin glargine Injectable (LANTUS) 38 Unit(s) SubCutaneous at bedtime  insulin lispro (ADMELOG) corrective regimen sliding scale   SubCutaneous Before meals and at bedtime  insulin lispro Injectable (ADMELOG) 7 Unit(s) SubCutaneous three times a day before meals  metoprolol tartrate 50 milliGRAM(s) Oral every 12 hours  pantoprazole    Tablet 40 milliGRAM(s) Oral daily  polyethylene glycol 3350 17 Gram(s) Oral daily  potassium chloride    Tablet ER 10 milliEquivalent(s) Oral daily  senna 2 Tablet(s) Oral at bedtime  sodium chloride 0.9% lock flush 3 milliLiter(s) IV Push every 8 hours  sodium chloride 0.9%. 1000 milliLiter(s) (10 mL/Hr) IV Continuous <Continuous>    MEDICATIONS  (PRN):  acetaminophen     Tablet .. 650 milliGRAM(s) Oral every 6 hours PRN Mild Pain (1 - 3)  dextrose Oral Gel 15 Gram(s) Oral once PRN Blood Glucose LESS THAN 70 milliGRAM(s)/deciliter  oxyCODONE    IR 10 milliGRAM(s) Oral every 6 hours PRN Severe Pain (7 - 10)  oxyCODONE    IR 5 milliGRAM(s) Oral every 6 hours PRN Moderate Pain (4 - 6)    RADIOLOGY & ADDITIONAL TESTS:     Patient discussed on morning rounds with Dr. Hu.     OPERATION & DATE: 5/24/23 -- OPCABx3 (LIMA-LAD, SVG-OM-PDA), EF 55%    SUBJECTIVE ASSESSMENT: Seen this morning resting in bed in NAD. Denies active chest pain, SOB, dizziness, N/V/D. Mild incisional site pain.     VITAL SIGNS:  Vital Signs Last 24 Hrs  T(C): 36.1 (30 May 2023 10:52), Max: 36.9 (29 May 2023 13:47)  T(F): 97 (30 May 2023 10:52), Max: 98.5 (29 May 2023 13:47)  HR: 62 (30 May 2023 08:30) (62 - 72)  BP: 124/57 (30 May 2023 08:30) (120/60 - 148/67)  BP(mean): 82 (30 May 2023 08:30) (82 - 98)  RR: 18 (30 May 2023 08:30) (16 - 18)  SpO2: 99% (30 May 2023 08:30) (95% - 99%)    Parameters below as of 30 May 2023 08:30  Patient On (Oxygen Delivery Method): room air    I&O's Detail    29 May 2023 07:01  -  30 May 2023 07:00  --------------------------------------------------------  IN:    Oral Fluid: 820 mL  Total IN: 820 mL    OUT:    Voided (mL): 1450 mL  Total OUT: 1450 mL    Total NET: -630 mL    CHEST TUBE: n  LYNETTE DRAIN:  n  EPICARDIAL WIRES: n  STITCHES:n  STAPLES:n  AVILEZ: n  CENTRAL LINE:n  MIDLINE/PICC:n  WOUND VAC:n    PHYSICAL EXAM:  General: NAD, sitting comfortably in chair, conversing appropriately  Neurological: alert and oriented, UE and LE strength equal b/l, facial symmetry present  Cardiovascular: RRR, Clear S1 and S2, no murmurs appreciated  Respiratory: chest expansion symmetrical, CTA b/l, no wheezing noted  Gastrointestinal: +BS, soft, NT, ND  Extremities: moving spontaneously, no calf tenderness or edema.  Vascular: warm, well perfused. DP/PT pulses palpable b/l.  Incisions: sternotomy site well healing with no ecchymosis or drainage,  EVH site healing well no erythema, purulence or ecchymosis, 2 tie downs in place     LABS:                        10.2   10.13 )-----------( 281      ( 30 May 2023 06:31 )             31.3     PT/INR - ( 29 May 2023 05:30 )   PT: 14.5 sec;   INR: 1.22          PTT - ( 29 May 2023 05:30 )  PTT:32.6 sec  05-30    141  |  104  |  25<H>  ----------------------------<  145<H>  4.1   |  24  |  1.16    Ca    9.1      30 May 2023 06:31  Mg     2.0     05-30    TPro  6.4  /  Alb  3.6  /  TBili  0.7  /  DBili  x   /  AST  19  /  ALT  17  /  AlkPhos  82  05-30      MEDICATIONS  (STANDING):  aMIOdarone    Tablet 200 milliGRAM(s) Oral daily  aMIOdarone    Tablet   Oral   apixaban 5 milliGRAM(s) Oral every 12 hours  aspirin enteric coated 81 milliGRAM(s) Oral daily  atorvastatin 40 milliGRAM(s) Oral at bedtime  dextrose 5%. 1000 milliLiter(s) (50 mL/Hr) IV Continuous <Continuous>  dextrose 5%. 1000 milliLiter(s) (100 mL/Hr) IV Continuous <Continuous>  dextrose 50% Injectable 50 milliLiter(s) IV Push every 15 minutes  dextrose 50% Injectable 25 milliLiter(s) IV Push every 15 minutes  furosemide    Tablet 20 milliGRAM(s) Oral daily  glucagon  Injectable 1 milliGRAM(s) IntraMuscular once  guaiFENesin  milliGRAM(s) Oral every 12 hours  insulin glargine Injectable (LANTUS) 38 Unit(s) SubCutaneous at bedtime  insulin lispro (ADMELOG) corrective regimen sliding scale   SubCutaneous Before meals and at bedtime  insulin lispro Injectable (ADMELOG) 7 Unit(s) SubCutaneous three times a day before meals  metoprolol tartrate 50 milliGRAM(s) Oral every 12 hours  pantoprazole    Tablet 40 milliGRAM(s) Oral daily  polyethylene glycol 3350 17 Gram(s) Oral daily  potassium chloride    Tablet ER 10 milliEquivalent(s) Oral daily  senna 2 Tablet(s) Oral at bedtime  sodium chloride 0.9% lock flush 3 milliLiter(s) IV Push every 8 hours  sodium chloride 0.9%. 1000 milliLiter(s) (10 mL/Hr) IV Continuous <Continuous>    MEDICATIONS  (PRN):  acetaminophen     Tablet .. 650 milliGRAM(s) Oral every 6 hours PRN Mild Pain (1 - 3)  dextrose Oral Gel 15 Gram(s) Oral once PRN Blood Glucose LESS THAN 70 milliGRAM(s)/deciliter  oxyCODONE    IR 10 milliGRAM(s) Oral every 6 hours PRN Severe Pain (7 - 10)  oxyCODONE    IR 5 milliGRAM(s) Oral every 6 hours PRN Moderate Pain (4 - 6)    RADIOLOGY & ADDITIONAL TESTS:

## 2023-05-31 LAB
ANION GAP SERPL CALC-SCNC: 9 MMOL/L — SIGNIFICANT CHANGE UP (ref 5–17)
BUN SERPL-MCNC: 25 MG/DL — HIGH (ref 7–23)
CALCIUM SERPL-MCNC: 8.8 MG/DL — SIGNIFICANT CHANGE UP (ref 8.4–10.5)
CHLORIDE SERPL-SCNC: 105 MMOL/L — SIGNIFICANT CHANGE UP (ref 96–108)
CO2 SERPL-SCNC: 26 MMOL/L — SIGNIFICANT CHANGE UP (ref 22–31)
CREAT SERPL-MCNC: 1.05 MG/DL — SIGNIFICANT CHANGE UP (ref 0.5–1.3)
EGFR: 75 ML/MIN/1.73M2 — SIGNIFICANT CHANGE UP
GLUCOSE BLDC GLUCOMTR-MCNC: 117 MG/DL — HIGH (ref 70–99)
GLUCOSE BLDC GLUCOMTR-MCNC: 148 MG/DL — HIGH (ref 70–99)
GLUCOSE BLDC GLUCOMTR-MCNC: 209 MG/DL — HIGH (ref 70–99)
GLUCOSE BLDC GLUCOMTR-MCNC: 211 MG/DL — HIGH (ref 70–99)
GLUCOSE BLDC GLUCOMTR-MCNC: 86 MG/DL — SIGNIFICANT CHANGE UP (ref 70–99)
GLUCOSE SERPL-MCNC: 188 MG/DL — HIGH (ref 70–99)
HCT VFR BLD CALC: 29.2 % — LOW (ref 39–50)
HGB BLD-MCNC: 9.2 G/DL — LOW (ref 13–17)
MAGNESIUM SERPL-MCNC: 2 MG/DL — SIGNIFICANT CHANGE UP (ref 1.6–2.6)
MCHC RBC-ENTMCNC: 28.6 PG — SIGNIFICANT CHANGE UP (ref 27–34)
MCHC RBC-ENTMCNC: 31.5 GM/DL — LOW (ref 32–36)
MCV RBC AUTO: 90.7 FL — SIGNIFICANT CHANGE UP (ref 80–100)
NRBC # BLD: 0 /100 WBCS — SIGNIFICANT CHANGE UP (ref 0–0)
PLATELET # BLD AUTO: 302 K/UL — SIGNIFICANT CHANGE UP (ref 150–400)
POTASSIUM SERPL-MCNC: 4.1 MMOL/L — SIGNIFICANT CHANGE UP (ref 3.5–5.3)
POTASSIUM SERPL-SCNC: 4.1 MMOL/L — SIGNIFICANT CHANGE UP (ref 3.5–5.3)
RBC # BLD: 3.22 M/UL — LOW (ref 4.2–5.8)
RBC # FLD: 14.1 % — SIGNIFICANT CHANGE UP (ref 10.3–14.5)
SODIUM SERPL-SCNC: 140 MMOL/L — SIGNIFICANT CHANGE UP (ref 135–145)
WBC # BLD: 10.13 K/UL — SIGNIFICANT CHANGE UP (ref 3.8–10.5)
WBC # FLD AUTO: 10.13 K/UL — SIGNIFICANT CHANGE UP (ref 3.8–10.5)

## 2023-05-31 PROCEDURE — 99231 SBSQ HOSP IP/OBS SF/LOW 25: CPT | Mod: GC

## 2023-05-31 PROCEDURE — 71046 X-RAY EXAM CHEST 2 VIEWS: CPT | Mod: 26

## 2023-05-31 RX ORDER — INSULIN LISPRO 100/ML
12 VIAL (ML) SUBCUTANEOUS
Refills: 0 | Status: DISCONTINUED | OUTPATIENT
Start: 2023-05-31 | End: 2023-06-01

## 2023-05-31 RX ORDER — ACETAMINOPHEN 500 MG
1000 TABLET ORAL ONCE
Refills: 0 | Status: COMPLETED | OUTPATIENT
Start: 2023-05-31 | End: 2023-05-31

## 2023-05-31 RX ADMIN — Medication 50 MILLIGRAM(S): at 06:17

## 2023-05-31 RX ADMIN — ATORVASTATIN CALCIUM 40 MILLIGRAM(S): 80 TABLET, FILM COATED ORAL at 22:16

## 2023-05-31 RX ADMIN — APIXABAN 5 MILLIGRAM(S): 2.5 TABLET, FILM COATED ORAL at 06:17

## 2023-05-31 RX ADMIN — Medication 600 MILLIGRAM(S): at 06:17

## 2023-05-31 RX ADMIN — Medication 10 MILLIEQUIVALENT(S): at 12:22

## 2023-05-31 RX ADMIN — Medication 12 UNIT(S): at 16:54

## 2023-05-31 RX ADMIN — POLYETHYLENE GLYCOL 3350 17 GRAM(S): 17 POWDER, FOR SOLUTION ORAL at 12:22

## 2023-05-31 RX ADMIN — AMIODARONE HYDROCHLORIDE 200 MILLIGRAM(S): 400 TABLET ORAL at 06:17

## 2023-05-31 RX ADMIN — Medication 20 MILLIGRAM(S): at 06:17

## 2023-05-31 RX ADMIN — SENNA PLUS 2 TABLET(S): 8.6 TABLET ORAL at 22:16

## 2023-05-31 RX ADMIN — Medication 650 MILLIGRAM(S): at 18:31

## 2023-05-31 RX ADMIN — Medication 4: at 12:23

## 2023-05-31 RX ADMIN — SODIUM CHLORIDE 3 MILLILITER(S): 9 INJECTION INTRAMUSCULAR; INTRAVENOUS; SUBCUTANEOUS at 14:08

## 2023-05-31 RX ADMIN — SODIUM CHLORIDE 3 MILLILITER(S): 9 INJECTION INTRAMUSCULAR; INTRAVENOUS; SUBCUTANEOUS at 06:17

## 2023-05-31 RX ADMIN — Medication 12 UNIT(S): at 12:23

## 2023-05-31 RX ADMIN — Medication 650 MILLIGRAM(S): at 12:27

## 2023-05-31 RX ADMIN — Medication 50 MILLIGRAM(S): at 17:00

## 2023-05-31 RX ADMIN — Medication 650 MILLIGRAM(S): at 19:30

## 2023-05-31 RX ADMIN — Medication 600 MILLIGRAM(S): at 17:00

## 2023-05-31 RX ADMIN — Medication 81 MILLIGRAM(S): at 12:20

## 2023-05-31 RX ADMIN — Medication 650 MILLIGRAM(S): at 13:15

## 2023-05-31 RX ADMIN — Medication 12 UNIT(S): at 06:57

## 2023-05-31 RX ADMIN — SODIUM CHLORIDE 3 MILLILITER(S): 9 INJECTION INTRAMUSCULAR; INTRAVENOUS; SUBCUTANEOUS at 21:55

## 2023-05-31 RX ADMIN — APIXABAN 5 MILLIGRAM(S): 2.5 TABLET, FILM COATED ORAL at 17:00

## 2023-05-31 RX ADMIN — PANTOPRAZOLE SODIUM 40 MILLIGRAM(S): 20 TABLET, DELAYED RELEASE ORAL at 12:21

## 2023-05-31 RX ADMIN — Medication 4: at 06:55

## 2023-05-31 NOTE — PROGRESS NOTE ADULT - SUBJECTIVE AND OBJECTIVE BOX
SUBJECTIVE / INTERVAL HPI: Patient was seen and examined this morning.     CAPILLARY BLOOD GLUCOSE & INSULIN RECEIVED  193 mg/dL (05-30 @ 11:56)  194 mg/dL (05-30 @ 16:55)  237 mg/dL (05-30 @ 21:37)  211 mg/dL (05-31 @ 06:51)  209 mg/dL (05-31 @ 11:57)  117 mg/dL (05-31 @ 16:49)    REVIEW OF SYSTEMS  Constitutional:  Negative fever, chills or loss of appetite.  Eyes:  Negative blurry vision or double vision.  Cardiovascular:  Negative for chest pain or palpitations.  Respiratory:  Negative for cough, wheezing, or shortness of breath.    Gastrointestinal:  Negative for nausea, vomiting, diarrhea, constipation, or abdominal pain.  Genitourinary:  Negative frequency, urgency or dysuria.  Neurologic:  No headache, confusion, dizziness, lightheadedness.    PHYSICAL EXAM  Vital Signs Last 24 Hrs  T(C): 36.1 (31 May 2023 17:01), Max: 36.1 (30 May 2023 21:51)  T(F): 97 (31 May 2023 17:01), Max: 97 (30 May 2023 21:51)  HR: 66 (31 May 2023 16:45) (62 - 78)  BP: 134/60 (31 May 2023 16:45) (122/55 - 141/65)  BP(mean): 87 (31 May 2023 16:45) (79 - 94)  RR: 15 (31 May 2023 16:45) (15 - 18)  SpO2: 98% (31 May 2023 16:45) (96% - 100%)    Parameters below as of 31 May 2023 16:45  Patient On (Oxygen Delivery Method): room air    Constitutional: Awake, alert, in no acute distress.   HEENT: Normocephalic, atraumatic, MARSHALL.  Respiratory: Lungs clear to ausculation bilaterally.   Cardiovascular: regular rhythm, normal S1 and S2, no audible murmurs.   GI: soft, non-tender, non-distended, bowel sounds present.  Extremities: No lower extremity edema.  Psychiatric: AAO x 3. Normal affect/mood.     LABS  CBC - WBC/HGB/HTC/PLT: 10.13/9.2/29.2/302 (05-31-23)  BMP - Na/K/Cl/Bicarb/BUN/Cr/Gluc/AG/eGFR: 140/4.1/105/26/25/1.05/188/9/75 (05-31-23)  Ca - 8.8 (05-31-23)  Phos - -- (05-31-23)  Mg - 2.0 (05-31-23)  LFT - Alb/Tprot/Tbili/Dbili/AlkPhos/ALT/AST: 3.6/--/0.7/--/82/17/19 (05-30-23)  PT/aPTT/INR: 14.5/32.6/1.22 (05-29-23)   Thyroid Stimulating Hormone, Serum: 1.200 (05-20-23)    MEDICATIONS  MEDICATIONS  (STANDING):  aMIOdarone    Tablet   Oral   aMIOdarone    Tablet 200 milliGRAM(s) Oral daily  apixaban 5 milliGRAM(s) Oral every 12 hours  aspirin enteric coated 81 milliGRAM(s) Oral daily  atorvastatin 40 milliGRAM(s) Oral at bedtime  dextrose 5%. 1000 milliLiter(s) (50 mL/Hr) IV Continuous <Continuous>  dextrose 5%. 1000 milliLiter(s) (100 mL/Hr) IV Continuous <Continuous>  dextrose 50% Injectable 50 milliLiter(s) IV Push every 15 minutes  dextrose 50% Injectable 25 milliLiter(s) IV Push every 15 minutes  furosemide    Tablet 20 milliGRAM(s) Oral daily  glucagon  Injectable 1 milliGRAM(s) IntraMuscular once  guaiFENesin  milliGRAM(s) Oral every 12 hours  insulin glargine Injectable (LANTUS) 38 Unit(s) SubCutaneous at bedtime  insulin lispro (ADMELOG) corrective regimen sliding scale   SubCutaneous Before meals and at bedtime  insulin lispro Injectable (ADMELOG) 12 Unit(s) SubCutaneous three times a day before meals  metoprolol tartrate 50 milliGRAM(s) Oral every 12 hours  pantoprazole    Tablet 40 milliGRAM(s) Oral daily  polyethylene glycol 3350 17 Gram(s) Oral daily  potassium chloride    Tablet ER 10 milliEquivalent(s) Oral daily  senna 2 Tablet(s) Oral at bedtime  sodium chloride 0.9% lock flush 3 milliLiter(s) IV Push every 8 hours  sodium chloride 0.9%. 1000 milliLiter(s) (10 mL/Hr) IV Continuous <Continuous>    MEDICATIONS  (PRN):  acetaminophen     Tablet .. 650 milliGRAM(s) Oral every 6 hours PRN Mild Pain (1 - 3)  dextrose Oral Gel 15 Gram(s) Oral once PRN Blood Glucose LESS THAN 70 milliGRAM(s)/deciliter  oxyCODONE    IR 10 milliGRAM(s) Oral every 6 hours PRN Severe Pain (7 - 10)  oxyCODONE    IR 5 milliGRAM(s) Oral every 6 hours PRN Moderate Pain (4 - 6)    ASSESSMENT / RECOMMENDATIONS    A1C: 10.2 %  BUN: 25  Creatinine: 1.05  GFR: 75  Weight: 69.4  BMI: 26.2  EF:     # Type 2 diabetes mellitus with hyperglycemia  - Please continue lantus *** units at bedtime.   - Continue lispro *** units before each meal.  - Continue lispro moderate / low dose sliding scale before meals and at bedtime.  - Patient's fingerstick glucose goal is 100-180 mg/dL.    - Discharge recommendations to be discussed.   - Patient can follow up at discharge with [facility] by calling [phone] to make an appointment.      Case discussed with Dr. Kennedy. Primary team updated.       Josue Ibarra    Endocrinology Fellow    Service Pager: [phone]    SUBJECTIVE / INTERVAL HPI: Patient was seen and examined this morning. Per wife, he has had decreased appetite and has been eating ~30% of his meals. Nonetheless, blood glucose levels have been above target. His glucose remained elevated overnight despite receiving 38 units and correction doses. He also remained >200 mg/dL at noon despite receiving 12 units of lispro plus correction before breakfast.     CAPILLARY BLOOD GLUCOSE & INSULIN RECEIVED  277 mg/dL (05-29 @ 21:11) ? 38 units of lantus + 6 units of lispro sliding scale.   181 mg/dL (05-29 @ 21:33) ? Ø  136 mg/dL (05-30 @ 06:43) ? 7 units of lispro.   193 mg/dL (05-30 @ 11:56) ? 7 units of lispro + 2 units of lispro sliding scale.   194 mg/dL (05-30 @ 16:55) ? 10 units of lispro + 2 units of lispro sliding scale.   237 mg/dL (05-30 @ 21:37) ? 38 units of lantus + 4 units of lispro sliding scale.   211 mg/dL (05-31 @ 06:51) ? 12 units of lispro + 4 units of lispro sliding scale.   209 mg/dL (05-31 @ 11:57) ? 12 units of lispro + 4 units of lispro sliding scale.     REVIEW OF SYSTEMS  Constitutional:  (+) Decreased appetite. Negative fever, chills.   Cardiovascular:  Negative for chest pain or palpitations.  Respiratory:  Negative for cough, wheezing, or shortness of breath.    Gastrointestinal:  Negative for nausea, vomiting, diarrhea, constipation, or abdominal pain.  Neurologic:  (+) Impaired memory. No headache, dizziness, lightheadedness.    PHYSICAL EXAM  Vital Signs Last 24 Hrs  T(C): 36.1 (31 May 2023 17:01), Max: 36.1 (30 May 2023 21:51)  T(F): 97 (31 May 2023 17:01), Max: 97 (30 May 2023 21:51)  HR: 66 (31 May 2023 16:45) (62 - 78)  BP: 134/60 (31 May 2023 16:45) (122/55 - 141/65)  BP(mean): 87 (31 May 2023 16:45) (79 - 94)  RR: 15 (31 May 2023 16:45) (15 - 18)  SpO2: 98% (31 May 2023 16:45) (96% - 100%)    Parameters below as of 31 May 2023 16:45  Patient On (Oxygen Delivery Method): room air    Constitutional: Awake, alert, in no acute distress.   HEENT: Normocephalic, atraumatic, MARSHALL.  Respiratory: Lungs clear to ausculation bilaterally.   Cardiovascular: regular rhythm, normal S1 and S2, no audible murmurs. (+) sternotomy wound.   GI: soft, non-tender, non-distended, bowel sounds present.  Extremities: No lower extremity edema.  Psychiatric: AAO x 3. Normal affect/mood.     LABS  CBC - WBC/HGB/HTC/PLT: 10.13/9.2/29.2/302 (05-31-23)  BMP - Na/K/Cl/Bicarb/BUN/Cr/Gluc/AG/eGFR: 140/4.1/105/26/25/1.05/188/9/75 (05-31-23)  Ca - 8.8 (05-31-23)  Phos - -- (05-31-23)  Mg - 2.0 (05-31-23)  LFT - Alb/Tprot/Tbili/Dbili/AlkPhos/ALT/AST: 3.6/--/0.7/--/82/17/19 (05-30-23)  PT/aPTT/INR: 14.5/32.6/1.22 (05-29-23)   Thyroid Stimulating Hormone, Serum: 1.200 (05-20-23)    MEDICATIONS  MEDICATIONS  (STANDING):  aMIOdarone    Tablet   Oral   aMIOdarone    Tablet 200 milliGRAM(s) Oral daily  apixaban 5 milliGRAM(s) Oral every 12 hours  aspirin enteric coated 81 milliGRAM(s) Oral daily  atorvastatin 40 milliGRAM(s) Oral at bedtime  dextrose 5%. 1000 milliLiter(s) (50 mL/Hr) IV Continuous <Continuous>  dextrose 5%. 1000 milliLiter(s) (100 mL/Hr) IV Continuous <Continuous>  dextrose 50% Injectable 50 milliLiter(s) IV Push every 15 minutes  dextrose 50% Injectable 25 milliLiter(s) IV Push every 15 minutes  furosemide    Tablet 20 milliGRAM(s) Oral daily  glucagon  Injectable 1 milliGRAM(s) IntraMuscular once  guaiFENesin  milliGRAM(s) Oral every 12 hours  insulin glargine Injectable (LANTUS) 38 Unit(s) SubCutaneous at bedtime  insulin lispro (ADMELOG) corrective regimen sliding scale   SubCutaneous Before meals and at bedtime  insulin lispro Injectable (ADMELOG) 12 Unit(s) SubCutaneous three times a day before meals  metoprolol tartrate 50 milliGRAM(s) Oral every 12 hours  pantoprazole    Tablet 40 milliGRAM(s) Oral daily  polyethylene glycol 3350 17 Gram(s) Oral daily  potassium chloride    Tablet ER 10 milliEquivalent(s) Oral daily  senna 2 Tablet(s) Oral at bedtime  sodium chloride 0.9% lock flush 3 milliLiter(s) IV Push every 8 hours  sodium chloride 0.9%. 1000 milliLiter(s) (10 mL/Hr) IV Continuous <Continuous>    MEDICATIONS  (PRN):  acetaminophen     Tablet .. 650 milliGRAM(s) Oral every 6 hours PRN Mild Pain (1 - 3)  dextrose Oral Gel 15 Gram(s) Oral once PRN Blood Glucose LESS THAN 70 milliGRAM(s)/deciliter  oxyCODONE    IR 10 milliGRAM(s) Oral every 6 hours PRN Severe Pain (7 - 10)  oxyCODONE    IR 5 milliGRAM(s) Oral every 6 hours PRN Moderate Pain (4 - 6)    ASSESSMENT / RECOMMENDATIONS  Mr. Fox is a 65-year-old male with a past medical history of type 2 diabetes mellitus, hypertension, hyperlipidemia, CVA (2016, no residual deficits) and Alzheimer's dementia who presented to [facility] emergency department complaining of chest pain. He was found to have NSTEMI and underwent cardiac cath revealing coronary artery disease. He was then transferred to [facility] for further management, now s/p CABG (5/24/2023). Endocrinology following for recommendations regarding diabetes management.     A1C: 10.2 %  BUN: 25  Creatinine: 1.05  GFR: 75  Weight: 69.4  BMI: 26.2    # Type 2 diabetes mellitus with hyperglycemia  - Blood glucose levels have been above target. His glucose remained elevated overnight despite receiving 38 units and correction doses. He also remained >200 mg/dL at noon despite receiving 12 units of lispro plus correction before breakfast.   - Please INCREASE lantus to 44 units at bedtime.   - INCREASE lispro to 14 units before each meal.  - Continue lispro moderate dose sliding scale before meals and at bedtime.  - Patient's fingerstick glucose goal is 100-180 mg/dL.    - Discharge recommendations to be discussed.   - Patient can follow up at discharge with [facility] by calling [phone] to make an appointment.      Case discussed with Dr. Kennedy. Primary team updated.       Josue Ibarra    Endocrinology Fellow    Service Pager: [phone]

## 2023-05-31 NOTE — PROGRESS NOTE ADULT - ASSESSMENT
Assessment:   71yo M with past medical history significant for HTN, HLD, prior tobacco use, hx of CVA and Alzheimers disease. He was transferred to our facility from [facility] for surgical evaluation in the setting of NSTEMI. 5/24/23 he underwent uncomplicated OPCAB x 3 LIMA-LAD, SVG- OM-PDA with Dr. Calloway.  Extubated POD #1 and weaned off pressor support. POD #2 developed AF RVR s/p amio boluses to PO load, and uptitration of BB therapy. Pleural CT removed and he was transferred to floor. POD #3 pacing wire removed. POD #5 given lasix secondary to increased congestion on CXR. Abimael removed. Started on eliquis. POD#7 awaiting discharge to [facility].    Plan:    Neurovascular:   -Pain moderately controlled      - PRN's: tylenol, oxy5  -hx CVA 2016    - no residual deficits   -hx of Alzheimers disease w/ baseline cognitive deficits      Cardiovascular:   -POD#7 OPCABx3 (LIMA-LAD, SVG-OM-PDA)    -continue with asa, statin (Plavix discontinued 2/2 initiation of eliquis)     -postoperative AF       -continue amio load and metoprolol 12.5mg Q6hr      -eliquis started this admission        -remains NSR  -Hemodynamically stable.   -Monitor: BP, HR, tele    Respiratory:   -Oxygenating well on room air  -Encourage continued use of IS 10x/hr and frequent ambulation  -CXR: stable     GI:  -GI PPX: protonix   -PO Diet  -Bowel Regimen: PPI, senna     Renal / :  -Continue to monitor renal function: BUN/Cr  -Monitor I/O's daily     Endocrine:    -No hx of DM or thyroid dx  -A1c: 10.2     -on lantus 38, lispro 7, RISS   -TSH: 1.2    Hematologic:  - no overt s/s of active bleeding   -CBC: H/H- 10.2/31.3  -Coagulation Panel.    ID:  -Temperature: afebrile   -CBC: WBC-WNL  -Continue to observe for SIRS/Sepsis Syndrome.    Prophylaxis:  -DVT prophylaxis with 5000 SubQ Heparin q8h.  -Continue with SCD's b/l while patient is at rest     Disposition:  -Pending [facility] placement   Assessment:   71yo M with past medical history significant for HTN, HLD, prior tobacco use, hx of CVA and Alzheimers disease. He was transferred to our facility from [facility] for surgical evaluation in the setting of NSTEMI. 5/24/23 he underwent uncomplicated OPCAB x 3 LIMA-LAD, SVG- OM-PDA with Dr. Calloway.  Extubated POD #1 and weaned off pressor support. POD #2 developed AF RVR s/p amio boluses to PO load, and uptitration of BB therapy. Pleural CT removed and he was transferred to floor. POD #3 pacing wire removed. POD #5 given lasix secondary to increased congestion on CXR. Abimael removed. Started on eliquis. POD#7 awaiting discharge to [facility].    Plan:    Neurovascular:   -Pain moderately controlled      - PRN's: tylenol, oxy5  -hx CVA 2016    - no residual deficits   -hx of Alzheimers disease w/ baseline cognitive deficits      Cardiovascular:   -POD#7 OPCABx3 (LIMA-LAD, SVG-OM-PDA)    -continue with asa, statin (Plavix discontinued 2/2 initiation of eliquis)     -postoperative AF       -continue amio load and metoprolol 12.5mg Q6hr      -eliquis started this admission        -remains NSR  -Hemodynamically stable.   -Monitor: BP, HR, tele    Respiratory:   -Oxygenating well on room air  -Encourage continued use of IS 10x/hr and frequent ambulation  -CXR: congestion improved; continue PO lasix     GI:  -GI PPX: protonix   -PO Diet  -Bowel Regimen: senna, miralax     Renal / :  -Continue to monitor renal function: BUN/Cr 25/1.05  -Monitor I/O's daily     Endocrine:    -No hx of DM or thyroid dx  -A1c: 10.2     -on lantus 38, lispro 7, RISS   -TSH: 1.2    Hematologic:  - no overt s/s of active bleeding   -CBC: H/H- 9.2/29.2  -Coagulation Panel.    ID:  -Temperature: afebrile   -CBC: WBC-WNL  -Continue to observe for SIRS/Sepsis Syndrome.    Prophylaxis:  -DVT prophylaxis with 5000 SubQ Heparin q8h.  -Continue with SCD's b/l while patient is at rest     Disposition:  -Pending [facility] placement

## 2023-05-31 NOTE — PROGRESS NOTE ADULT - SUBJECTIVE AND OBJECTIVE BOX
Patient discussed on morning rounds with Dr. Wilson    OPERATION & DATE: 5/24/23 -- OPCABx3 (LIMA-LAD, SVG-OM-PDA), EF 55%    SUBJECTIVE ASSESSMENT: Seen resting in bed in NAD. Reports incisional site pain. Denies chest pain, SOB, palpitations N/V/D.     VITAL SIGNS:  Vital Signs Last 24 Hrs  T(C): 36.1 (31 May 2023 10:14), Max: 36.6 (30 May 2023 17:33)  T(F): 97 (31 May 2023 10:14), Max: 97.9 (30 May 2023 17:33)  HR: 78 (31 May 2023 06:40) (64 - 80)  BP: 141/65 (31 May 2023 06:40) (123/58 - 154/69)  BP(mean): 94 (31 May 2023 06:40) (83 - 100)  RR: 17 (31 May 2023 06:40) (17 - 18)  SpO2: 96% (31 May 2023 06:40) (96% - 98%)    Parameters below as of 31 May 2023 06:40  Patient On (Oxygen Delivery Method): room air    I&O's Detail    30 May 2023 07:01  -  31 May 2023 07:00  --------------------------------------------------------  IN:    Oral Fluid: 60 mL  Total IN: 60 mL    OUT:    Voided (mL): 1325 mL  Total OUT: 1325 mL    Total NET: -1265 mL      31 May 2023 07:01  -  31 May 2023 11:44  --------------------------------------------------------  IN:  Total IN: 0 mL    OUT:    Voided (mL): 150 mL  Total OUT: 150 mL    Total NET: -150 mL    CHEST TUBE: n  LYNETTE DRAIN:  n  EPICARDIAL WIRES: n  STITCHES: y  STAPLES: n  AVILEZ: n  CENTRAL LINE: n  MIDLINE/PICC: n  WOUND VAC: n    PHYSICAL EXAM:  General: NAD, sitting comfortably in chair, conversing appropriately  Neurological: alert and oriented, UE and LE strength equal b/l, facial symmetry present  Cardiovascular: RRR, Clear S1 and S2, no murmurs appreciated  Respiratory: chest expansion symmetrical, CTA b/l, no wheezing noted  Gastrointestinal: +BS, soft, NT, ND  Extremities: moving spontaneously, no calf tenderness or edema.  Vascular: warm, well perfused. DP/PT pulses palpable b/l.  Incisions: Incisions: sternotomy site well healing with no ecchymosis or drainage,  EVH site healing well no erythema, purulence or ecchymosis, 2 tie downs in place     LABS:                        9.2    10.13 )-----------( 302      ( 31 May 2023 05:30 )             29.2       05-31    140  |  105  |  25<H>  ----------------------------<  188<H>  4.1   |  26  |  1.05    Ca    8.8      31 May 2023 05:30  Mg     2.0     05-31    TPro  6.4  /  Alb  3.6  /  TBili  0.7  /  DBili  x   /  AST  19  /  ALT  17  /  AlkPhos  82  05-30      MEDICATIONS  (STANDING):  aMIOdarone    Tablet 200 milliGRAM(s) Oral daily  aMIOdarone    Tablet   Oral   apixaban 5 milliGRAM(s) Oral every 12 hours  aspirin enteric coated 81 milliGRAM(s) Oral daily  atorvastatin 40 milliGRAM(s) Oral at bedtime  dextrose 5%. 1000 milliLiter(s) (50 mL/Hr) IV Continuous <Continuous>  dextrose 5%. 1000 milliLiter(s) (100 mL/Hr) IV Continuous <Continuous>  dextrose 50% Injectable 50 milliLiter(s) IV Push every 15 minutes  dextrose 50% Injectable 25 milliLiter(s) IV Push every 15 minutes  furosemide    Tablet 20 milliGRAM(s) Oral daily  glucagon  Injectable 1 milliGRAM(s) IntraMuscular once  guaiFENesin  milliGRAM(s) Oral every 12 hours  insulin glargine Injectable (LANTUS) 38 Unit(s) SubCutaneous at bedtime  insulin lispro (ADMELOG) corrective regimen sliding scale   SubCutaneous Before meals and at bedtime  insulin lispro Injectable (ADMELOG) 12 Unit(s) SubCutaneous three times a day before meals  metoprolol tartrate 50 milliGRAM(s) Oral every 12 hours  pantoprazole    Tablet 40 milliGRAM(s) Oral daily  polyethylene glycol 3350 17 Gram(s) Oral daily  potassium chloride    Tablet ER 10 milliEquivalent(s) Oral daily  senna 2 Tablet(s) Oral at bedtime  sodium chloride 0.9% lock flush 3 milliLiter(s) IV Push every 8 hours  sodium chloride 0.9%. 1000 milliLiter(s) (10 mL/Hr) IV Continuous <Continuous>    MEDICATIONS  (PRN):  acetaminophen     Tablet .. 650 milliGRAM(s) Oral every 6 hours PRN Mild Pain (1 - 3)  dextrose Oral Gel 15 Gram(s) Oral once PRN Blood Glucose LESS THAN 70 milliGRAM(s)/deciliter  oxyCODONE    IR 10 milliGRAM(s) Oral every 6 hours PRN Severe Pain (7 - 10)  oxyCODONE    IR 5 milliGRAM(s) Oral every 6 hours PRN Moderate Pain (4 - 6)    RADIOLOGY & ADDITIONAL TESTS:     Patient discussed on morning rounds with Dr. Wilson    OPERATION & DATE: 5/24/23 -- OPCABx3 (LIMA-LAD, SVG-OM-PDA), LVEF 55%    SUBJECTIVE ASSESSMENT: Seen resting in bed in NAD. Reports incisional site pain. Denies chest pain, SOB, palpitations N/V/D.     VITAL SIGNS:  Vital Signs Last 24 Hrs  T(C): 36.1 (31 May 2023 10:14), Max: 36.6 (30 May 2023 17:33)  T(F): 97 (31 May 2023 10:14), Max: 97.9 (30 May 2023 17:33)  HR: 78 (31 May 2023 06:40) (64 - 80)  BP: 141/65 (31 May 2023 06:40) (123/58 - 154/69)  BP(mean): 94 (31 May 2023 06:40) (83 - 100)  RR: 17 (31 May 2023 06:40) (17 - 18)  SpO2: 96% (31 May 2023 06:40) (96% - 98%)    Parameters below as of 31 May 2023 06:40  Patient On (Oxygen Delivery Method): room air    I&O's Detail    30 May 2023 07:01  -  31 May 2023 07:00  --------------------------------------------------------  IN:    Oral Fluid: 60 mL  Total IN: 60 mL    OUT:    Voided (mL): 1325 mL  Total OUT: 1325 mL    Total NET: -1265 mL      31 May 2023 07:01  -  31 May 2023 11:44  --------------------------------------------------------  IN:  Total IN: 0 mL    OUT:    Voided (mL): 150 mL  Total OUT: 150 mL    Total NET: -150 mL    CHEST TUBE: n  LYNETTE DRAIN:  n  EPICARDIAL WIRES: n  STITCHES: y  STAPLES: n  AVILEZ: n  CENTRAL LINE: n  MIDLINE/PICC: n  WOUND VAC: n    PHYSICAL EXAM:  General: NAD, sitting comfortably in chair, conversing appropriately  Neurological: alert and oriented, UE and LE strength equal b/l, facial symmetry present  Cardiovascular: RRR, Clear S1 and S2, no murmurs appreciated  Respiratory: chest expansion symmetrical, CTA b/l, no wheezing noted  Gastrointestinal: +BS, soft, NT, ND  Extremities: moving spontaneously, no calf tenderness or edema.  Vascular: warm, well perfused. DP/PT pulses palpable b/l.  Incisions: Incisions: sternotomy site well healing with no ecchymosis or drainage,  EVH site healing well no erythema, purulence or ecchymosis, tie downs removed      LABS:                        9.2    10.13 )-----------( 302      ( 31 May 2023 05:30 )             29.2       05-31    140  |  105  |  25<H>  ----------------------------<  188<H>  4.1   |  26  |  1.05    Ca    8.8      31 May 2023 05:30  Mg     2.0     05-31    TPro  6.4  /  Alb  3.6  /  TBili  0.7  /  DBili  x   /  AST  19  /  ALT  17  /  AlkPhos  82  05-30      MEDICATIONS  (STANDING):  aMIOdarone    Tablet 200 milliGRAM(s) Oral daily  aMIOdarone    Tablet   Oral   apixaban 5 milliGRAM(s) Oral every 12 hours  aspirin enteric coated 81 milliGRAM(s) Oral daily  atorvastatin 40 milliGRAM(s) Oral at bedtime  dextrose 5%. 1000 milliLiter(s) (50 mL/Hr) IV Continuous <Continuous>  dextrose 5%. 1000 milliLiter(s) (100 mL/Hr) IV Continuous <Continuous>  dextrose 50% Injectable 50 milliLiter(s) IV Push every 15 minutes  dextrose 50% Injectable 25 milliLiter(s) IV Push every 15 minutes  furosemide    Tablet 20 milliGRAM(s) Oral daily  glucagon  Injectable 1 milliGRAM(s) IntraMuscular once  guaiFENesin  milliGRAM(s) Oral every 12 hours  insulin glargine Injectable (LANTUS) 38 Unit(s) SubCutaneous at bedtime  insulin lispro (ADMELOG) corrective regimen sliding scale   SubCutaneous Before meals and at bedtime  insulin lispro Injectable (ADMELOG) 12 Unit(s) SubCutaneous three times a day before meals  metoprolol tartrate 50 milliGRAM(s) Oral every 12 hours  pantoprazole    Tablet 40 milliGRAM(s) Oral daily  polyethylene glycol 3350 17 Gram(s) Oral daily  potassium chloride    Tablet ER 10 milliEquivalent(s) Oral daily  senna 2 Tablet(s) Oral at bedtime  sodium chloride 0.9% lock flush 3 milliLiter(s) IV Push every 8 hours  sodium chloride 0.9%. 1000 milliLiter(s) (10 mL/Hr) IV Continuous <Continuous>    MEDICATIONS  (PRN):  acetaminophen     Tablet .. 650 milliGRAM(s) Oral every 6 hours PRN Mild Pain (1 - 3)  dextrose Oral Gel 15 Gram(s) Oral once PRN Blood Glucose LESS THAN 70 milliGRAM(s)/deciliter  oxyCODONE    IR 10 milliGRAM(s) Oral every 6 hours PRN Severe Pain (7 - 10)  oxyCODONE    IR 5 milliGRAM(s) Oral every 6 hours PRN Moderate Pain (4 - 6)    RADIOLOGY & ADDITIONAL TESTS:

## 2023-06-01 ENCOUNTER — TRANSCRIPTION ENCOUNTER (OUTPATIENT)
Age: 73
End: 2023-06-01

## 2023-06-01 LAB
ANION GAP SERPL CALC-SCNC: 10 MMOL/L — SIGNIFICANT CHANGE UP (ref 5–17)
BUN SERPL-MCNC: 23 MG/DL — SIGNIFICANT CHANGE UP (ref 7–23)
CALCIUM SERPL-MCNC: 9.1 MG/DL — SIGNIFICANT CHANGE UP (ref 8.4–10.5)
CHLORIDE SERPL-SCNC: 104 MMOL/L — SIGNIFICANT CHANGE UP (ref 96–108)
CO2 SERPL-SCNC: 26 MMOL/L — SIGNIFICANT CHANGE UP (ref 22–31)
CREAT SERPL-MCNC: 1.13 MG/DL — SIGNIFICANT CHANGE UP (ref 0.5–1.3)
EGFR: 69 ML/MIN/1.73M2 — SIGNIFICANT CHANGE UP
GLUCOSE BLDC GLUCOMTR-MCNC: 107 MG/DL — HIGH (ref 70–99)
GLUCOSE BLDC GLUCOMTR-MCNC: 128 MG/DL — HIGH (ref 70–99)
GLUCOSE BLDC GLUCOMTR-MCNC: 133 MG/DL — HIGH (ref 70–99)
GLUCOSE BLDC GLUCOMTR-MCNC: 151 MG/DL — HIGH (ref 70–99)
GLUCOSE BLDC GLUCOMTR-MCNC: 186 MG/DL — HIGH (ref 70–99)
GLUCOSE BLDC GLUCOMTR-MCNC: 261 MG/DL — HIGH (ref 70–99)
GLUCOSE SERPL-MCNC: 267 MG/DL — HIGH (ref 70–99)
HCT VFR BLD CALC: 29 % — LOW (ref 39–50)
HGB BLD-MCNC: 9.1 G/DL — LOW (ref 13–17)
MAGNESIUM SERPL-MCNC: 2.1 MG/DL — SIGNIFICANT CHANGE UP (ref 1.6–2.6)
MCHC RBC-ENTMCNC: 29.4 PG — SIGNIFICANT CHANGE UP (ref 27–34)
MCHC RBC-ENTMCNC: 31.4 GM/DL — LOW (ref 32–36)
MCV RBC AUTO: 93.5 FL — SIGNIFICANT CHANGE UP (ref 80–100)
NRBC # BLD: 0 /100 WBCS — SIGNIFICANT CHANGE UP (ref 0–0)
PLATELET # BLD AUTO: 386 K/UL — SIGNIFICANT CHANGE UP (ref 150–400)
POTASSIUM SERPL-MCNC: 4.5 MMOL/L — SIGNIFICANT CHANGE UP (ref 3.5–5.3)
POTASSIUM SERPL-SCNC: 4.5 MMOL/L — SIGNIFICANT CHANGE UP (ref 3.5–5.3)
RBC # BLD: 3.1 M/UL — LOW (ref 4.2–5.8)
RBC # FLD: 14.6 % — HIGH (ref 10.3–14.5)
SODIUM SERPL-SCNC: 140 MMOL/L — SIGNIFICANT CHANGE UP (ref 135–145)
WBC # BLD: 10.61 K/UL — HIGH (ref 3.8–10.5)
WBC # FLD AUTO: 10.61 K/UL — HIGH (ref 3.8–10.5)

## 2023-06-01 PROCEDURE — 99231 SBSQ HOSP IP/OBS SF/LOW 25: CPT | Mod: GC

## 2023-06-01 PROCEDURE — 93010 ELECTROCARDIOGRAM REPORT: CPT

## 2023-06-01 RX ORDER — INSULIN GLARGINE 100 [IU]/ML
42 INJECTION, SOLUTION SUBCUTANEOUS
Qty: 0 | Refills: 0 | DISCHARGE
Start: 2023-06-01

## 2023-06-01 RX ORDER — POTASSIUM CHLORIDE 20 MEQ
1 PACKET (EA) ORAL
Qty: 0 | Refills: 0 | DISCHARGE
Start: 2023-06-01

## 2023-06-01 RX ORDER — ATORVASTATIN CALCIUM 80 MG/1
1 TABLET, FILM COATED ORAL
Qty: 0 | Refills: 0 | DISCHARGE
Start: 2023-06-01

## 2023-06-01 RX ORDER — INSULIN GLARGINE 100 [IU]/ML
44 INJECTION, SOLUTION SUBCUTANEOUS AT BEDTIME
Refills: 0 | Status: DISCONTINUED | OUTPATIENT
Start: 2023-06-01 | End: 2023-06-01

## 2023-06-01 RX ORDER — POLYETHYLENE GLYCOL 3350 17 G/17G
17 POWDER, FOR SOLUTION ORAL
Qty: 0 | Refills: 0 | DISCHARGE
Start: 2023-06-01

## 2023-06-01 RX ORDER — LIDOCAINE 4 G/100G
1 CREAM TOPICAL
Qty: 0 | Refills: 0 | DISCHARGE
Start: 2023-06-01

## 2023-06-01 RX ORDER — ACETAMINOPHEN 500 MG
2 TABLET ORAL
Qty: 0 | Refills: 0 | DISCHARGE
Start: 2023-06-01

## 2023-06-01 RX ORDER — HUMAN INSULIN 100 [IU]/ML
15 INJECTION, SUSPENSION SUBCUTANEOUS ONCE
Refills: 0 | Status: COMPLETED | OUTPATIENT
Start: 2023-06-01 | End: 2023-06-01

## 2023-06-01 RX ORDER — APIXABAN 2.5 MG/1
1 TABLET, FILM COATED ORAL
Qty: 0 | Refills: 0 | DISCHARGE
Start: 2023-06-01

## 2023-06-01 RX ORDER — ASPIRIN/CALCIUM CARB/MAGNESIUM 324 MG
1 TABLET ORAL
Qty: 0 | Refills: 0 | DISCHARGE
Start: 2023-06-01

## 2023-06-01 RX ORDER — FUROSEMIDE 40 MG
1 TABLET ORAL
Qty: 0 | Refills: 0 | DISCHARGE
Start: 2023-06-01

## 2023-06-01 RX ORDER — INSULIN LISPRO 100/ML
10 VIAL (ML) SUBCUTANEOUS
Qty: 0 | Refills: 0 | DISCHARGE
Start: 2023-06-01

## 2023-06-01 RX ORDER — QUETIAPINE FUMARATE 200 MG/1
25 TABLET, FILM COATED ORAL ONCE
Refills: 0 | Status: COMPLETED | OUTPATIENT
Start: 2023-06-01 | End: 2023-06-01

## 2023-06-01 RX ORDER — LANOLIN ALCOHOL/MO/W.PET/CERES
5 CREAM (GRAM) TOPICAL ONCE
Refills: 0 | Status: COMPLETED | OUTPATIENT
Start: 2023-06-01 | End: 2023-06-01

## 2023-06-01 RX ORDER — LIDOCAINE 4 G/100G
1 CREAM TOPICAL EVERY 24 HOURS
Refills: 0 | Status: DISCONTINUED | OUTPATIENT
Start: 2023-06-01 | End: 2023-06-02

## 2023-06-01 RX ORDER — AMIODARONE HYDROCHLORIDE 400 MG/1
1 TABLET ORAL
Qty: 0 | Refills: 0 | DISCHARGE
Start: 2023-06-01

## 2023-06-01 RX ORDER — DONEPEZIL HYDROCHLORIDE 10 MG/1
10 TABLET, FILM COATED ORAL ONCE
Refills: 0 | Status: COMPLETED | OUTPATIENT
Start: 2023-06-01 | End: 2023-06-01

## 2023-06-01 RX ORDER — PANTOPRAZOLE SODIUM 20 MG/1
1 TABLET, DELAYED RELEASE ORAL
Qty: 0 | Refills: 0 | DISCHARGE
Start: 2023-06-01

## 2023-06-01 RX ORDER — INSULIN LISPRO 100/ML
10 VIAL (ML) SUBCUTANEOUS
Refills: 0 | Status: DISCONTINUED | OUTPATIENT
Start: 2023-06-01 | End: 2023-06-02

## 2023-06-01 RX ORDER — INSULIN LISPRO 100/ML
14 VIAL (ML) SUBCUTANEOUS
Refills: 0 | Status: DISCONTINUED | OUTPATIENT
Start: 2023-06-01 | End: 2023-06-01

## 2023-06-01 RX ORDER — DONEPEZIL HYDROCHLORIDE 10 MG/1
10 TABLET, FILM COATED ORAL
Refills: 0 | Status: DISCONTINUED | OUTPATIENT
Start: 2023-06-01 | End: 2023-06-02

## 2023-06-01 RX ORDER — METOPROLOL TARTRATE 50 MG
1 TABLET ORAL
Qty: 0 | Refills: 0 | DISCHARGE
Start: 2023-06-01

## 2023-06-01 RX ORDER — SENNA PLUS 8.6 MG/1
2 TABLET ORAL
Qty: 0 | Refills: 0 | DISCHARGE
Start: 2023-06-01

## 2023-06-01 RX ORDER — INSULIN GLARGINE 100 [IU]/ML
42 INJECTION, SOLUTION SUBCUTANEOUS AT BEDTIME
Refills: 0 | Status: DISCONTINUED | OUTPATIENT
Start: 2023-06-01 | End: 2023-06-02

## 2023-06-01 RX ADMIN — PANTOPRAZOLE SODIUM 40 MILLIGRAM(S): 20 TABLET, DELAYED RELEASE ORAL at 12:59

## 2023-06-01 RX ADMIN — Medication 6: at 07:55

## 2023-06-01 RX ADMIN — DONEPEZIL HYDROCHLORIDE 10 MILLIGRAM(S): 10 TABLET, FILM COATED ORAL at 16:56

## 2023-06-01 RX ADMIN — Medication 14 UNIT(S): at 12:15

## 2023-06-01 RX ADMIN — POLYETHYLENE GLYCOL 3350 17 GRAM(S): 17 POWDER, FOR SOLUTION ORAL at 13:00

## 2023-06-01 RX ADMIN — HUMAN INSULIN 15 UNIT(S): 100 INJECTION, SUSPENSION SUBCUTANEOUS at 10:01

## 2023-06-01 RX ADMIN — LIDOCAINE 1 PATCH: 4 CREAM TOPICAL at 18:34

## 2023-06-01 RX ADMIN — Medication 10 MILLIEQUIVALENT(S): at 13:00

## 2023-06-01 RX ADMIN — Medication 81 MILLIGRAM(S): at 13:00

## 2023-06-01 RX ADMIN — Medication 50 MILLIGRAM(S): at 06:02

## 2023-06-01 RX ADMIN — DONEPEZIL HYDROCHLORIDE 10 MILLIGRAM(S): 10 TABLET, FILM COATED ORAL at 22:41

## 2023-06-01 RX ADMIN — SENNA PLUS 2 TABLET(S): 8.6 TABLET ORAL at 22:40

## 2023-06-01 RX ADMIN — Medication 600 MILLIGRAM(S): at 06:02

## 2023-06-01 RX ADMIN — Medication 20 MILLIGRAM(S): at 06:02

## 2023-06-01 RX ADMIN — ATORVASTATIN CALCIUM 40 MILLIGRAM(S): 80 TABLET, FILM COATED ORAL at 22:40

## 2023-06-01 RX ADMIN — SODIUM CHLORIDE 3 MILLILITER(S): 9 INJECTION INTRAMUSCULAR; INTRAVENOUS; SUBCUTANEOUS at 22:42

## 2023-06-01 RX ADMIN — APIXABAN 5 MILLIGRAM(S): 2.5 TABLET, FILM COATED ORAL at 18:19

## 2023-06-01 RX ADMIN — LIDOCAINE 1 PATCH: 4 CREAM TOPICAL at 16:56

## 2023-06-01 RX ADMIN — APIXABAN 5 MILLIGRAM(S): 2.5 TABLET, FILM COATED ORAL at 06:02

## 2023-06-01 RX ADMIN — Medication 10 UNIT(S): at 22:53

## 2023-06-01 RX ADMIN — AMIODARONE HYDROCHLORIDE 200 MILLIGRAM(S): 400 TABLET ORAL at 06:02

## 2023-06-01 RX ADMIN — SODIUM CHLORIDE 3 MILLILITER(S): 9 INJECTION INTRAMUSCULAR; INTRAVENOUS; SUBCUTANEOUS at 15:52

## 2023-06-01 RX ADMIN — Medication 12 UNIT(S): at 07:55

## 2023-06-01 RX ADMIN — SODIUM CHLORIDE 3 MILLILITER(S): 9 INJECTION INTRAMUSCULAR; INTRAVENOUS; SUBCUTANEOUS at 05:50

## 2023-06-01 RX ADMIN — Medication 14 UNIT(S): at 16:56

## 2023-06-01 RX ADMIN — QUETIAPINE FUMARATE 25 MILLIGRAM(S): 200 TABLET, FILM COATED ORAL at 01:17

## 2023-06-01 RX ADMIN — Medication 5 MILLIGRAM(S): at 03:09

## 2023-06-01 RX ADMIN — Medication 650 MILLIGRAM(S): at 09:56

## 2023-06-01 RX ADMIN — INSULIN GLARGINE 42 UNIT(S): 100 INJECTION, SOLUTION SUBCUTANEOUS at 22:53

## 2023-06-01 RX ADMIN — Medication 50 MILLIGRAM(S): at 18:18

## 2023-06-01 RX ADMIN — Medication 650 MILLIGRAM(S): at 10:35

## 2023-06-01 NOTE — DISCHARGE NOTE PROVIDER - NSDCMRMEDTOKEN_GEN_ALL_CORE_FT
atorvastatin 40 mg oral tablet: 1 orally once a day  donepezil 10 mg oral tablet: 1 orally every 12 hours  Imdur 30 mg oral tablet, extended release: 1 orally once a day  Levemir 100 units/mL subcutaneous solution: 0.5 subcutaneous once a day  NovoLOG 100 units/mL injectable solution: injectable  Plavix 75 mg oral tablet: 1 orally once a day  telmisartan-hydrochlorothiazide 80 mg-12.5 mg oral tablet: 1 orally once a day  Vascepa 1 g oral capsule: 2 orally every 12 hours  Xigduo XR 10 mg-500 mg oral tablet, extended release: 1 orally once a day   acetaminophen 325 mg oral tablet: 2 tab(s) orally every 6 hours As needed Mild Pain (1 - 3)  amiodarone 200 mg oral tablet: 1 tab(s) orally once a day  apixaban 5 mg oral tablet: 1 tab(s) orally every 12 hours  aspirin 81 mg oral delayed release tablet: 1 tab(s) orally once a day  atorvastatin 40 mg oral tablet: 1 tab(s) orally once a day (at bedtime)  donepezil 10 mg oral tablet: 1 orally every 12 hours  furosemide 20 mg oral tablet: 1 tab(s) orally once a day  guaiFENesin 600 mg oral tablet, extended release: 1 tab(s) orally every 12 hours  insulin glargine 100 units/mL subcutaneous solution: 42 unit(s) subcutaneous once a day (at bedtime)  insulin lispro 100 units/mL injectable solution: 10 unit(s) injectable 3 times a day (before meals)  lidocaine 4% topical film: Apply topically to affected area every 24 hours  metoprolol tartrate 50 mg oral tablet: 1 tab(s) orally every 12 hours  pantoprazole 40 mg oral delayed release tablet: 1 tab(s) orally once a day  polyethylene glycol 3350 oral powder for reconstitution: 17 gram(s) orally once a day  potassium chloride 10 mEq oral tablet, extended release: 1 tab(s) orally once a day  senna leaf extract oral tablet: 2 tab(s) orally once a day (at bedtime)  Vascepa 1 g oral capsule: 2 orally every 12 hours

## 2023-06-01 NOTE — PROGRESS NOTE ADULT - SUBJECTIVE AND OBJECTIVE BOX
Patient discussed on morning rounds with Dr. Wilson    Operation / Date: 5/24/23 OPCABx3 (LIMA-LAD, SVG-OM-PDA), LVEF 55%    SUBJECTIVE ASSESSMENT:  72y Male assessed this AM. OOB ambulating. Pleasant, intermittently answering questions appropriatley. He had an episode of agitation/sundowning overnight. BM this AM. 100cc on IS.       Vital Signs Last 24 Hrs  T(C): 36.4 (01 Jun 2023 05:55), Max: 36.4 (01 Jun 2023 05:55)  T(F): 97.6 (01 Jun 2023 05:55), Max: 97.6 (01 Jun 2023 05:55)  HR: 66 (01 Jun 2023 13:23) (56 - 76)  BP: 134/62 (01 Jun 2023 13:23) (127/60 - 159/74)  BP(mean): 89 (01 Jun 2023 13:23) (85 - 101)  RR: 20 (01 Jun 2023 13:23) (15 - 20)  SpO2: 97% (01 Jun 2023 13:23) (94% - 98%)    Parameters below as of 01 Jun 2023 13:23  Patient On (Oxygen Delivery Method): room air      I&O's Detail    31 May 2023 07:01  -  01 Jun 2023 07:00  --------------------------------------------------------  IN:    Oral Fluid: 710 mL  Total IN: 710 mL    OUT:    Voided (mL): 1630 mL  Total OUT: 1630 mL    Total NET: -920 mL          CHEST TUBE:  No.  LYNETTE DRAIN:  No.  EPICARDIAL WIRES: Yes/No.  TIE DOWNS: Yes.  AVILEZ: No.    PHYSICAL EXAM:    Appearance: No acute distress.  Neurologic: AAOx3, no AMS or focal deficits.  Responds appropriately to verbal and physical stimuli; exhibits purposeful movement in all extremities.  HEENT:   MMM, PERRLA, EOMI	b/l  Neck: Supple   Cardiovascular: RRR, S1 S2. No m/r/g.  Respiratory: No acute respiratory distress. CTA b/l, no w/r/r.   Gastrointestinal:  Soft, non-tender, non-distended, + BS.	  Skin: No rashes. No ecchymoses. No cyanosis.  Extremities: Exhibits normal range of motion, no clubbing, cyanosis or edema.  Vascular: Peripheral pulses palpable 2+ bilaterally.   Incision Sites: MSI c/d/i, no click. Left leg evh site c/d/i.     LABS:                        9.1    10.61 )-----------( 386      ( 01 Jun 2023 05:30 )             29.0       COUMADIN:  No        06-01    140  |  104  |  23  ----------------------------<  267<H>  4.5   |  26  |  1.13    Ca    9.1      01 Jun 2023 05:30  Mg     2.1     06-01            MEDICATIONS  (STANDING):  aMIOdarone    Tablet 200 milliGRAM(s) Oral daily  aMIOdarone    Tablet   Oral   apixaban 5 milliGRAM(s) Oral every 12 hours  aspirin enteric coated 81 milliGRAM(s) Oral daily  atorvastatin 40 milliGRAM(s) Oral at bedtime  dextrose 5%. 1000 milliLiter(s) (50 mL/Hr) IV Continuous <Continuous>  dextrose 5%. 1000 milliLiter(s) (100 mL/Hr) IV Continuous <Continuous>  dextrose 50% Injectable 50 milliLiter(s) IV Push every 15 minutes  dextrose 50% Injectable 25 milliLiter(s) IV Push every 15 minutes  donepezil 10 milliGRAM(s) Oral <User Schedule>  donepezil 10 milliGRAM(s) Oral once  furosemide    Tablet 20 milliGRAM(s) Oral daily  glucagon  Injectable 1 milliGRAM(s) IntraMuscular once  guaiFENesin  milliGRAM(s) Oral every 12 hours  insulin glargine Injectable (LANTUS) 44 Unit(s) SubCutaneous at bedtime  insulin lispro (ADMELOG) corrective regimen sliding scale   SubCutaneous Before meals and at bedtime  insulin lispro Injectable (ADMELOG) 14 Unit(s) SubCutaneous three times a day before meals  metoprolol tartrate 50 milliGRAM(s) Oral every 12 hours  pantoprazole    Tablet 40 milliGRAM(s) Oral daily  polyethylene glycol 3350 17 Gram(s) Oral daily  potassium chloride    Tablet ER 10 milliEquivalent(s) Oral daily  senna 2 Tablet(s) Oral at bedtime  sodium chloride 0.9% lock flush 3 milliLiter(s) IV Push every 8 hours  sodium chloride 0.9%. 1000 milliLiter(s) (10 mL/Hr) IV Continuous <Continuous>    MEDICATIONS  (PRN):  acetaminophen     Tablet .. 650 milliGRAM(s) Oral every 6 hours PRN Mild Pain (1 - 3)  dextrose Oral Gel 15 Gram(s) Oral once PRN Blood Glucose LESS THAN 70 milliGRAM(s)/deciliter  oxyCODONE    IR 10 milliGRAM(s) Oral every 6 hours PRN Severe Pain (7 - 10)        RADIOLOGY & ADDITIONAL TESTS:  < from: Xray Chest 2 Views PA/Lat (05.31.23 @ 09:05) >  FINDINGS: Small left midlung and right lower lung discoid atelectasis,   grossly unchanged. No acute opacities. Small bilateral pleural effusions.   The cardiomediastinal silhouette is unchanged. Poststernotomy. Aortic   knob calcifications. No acute osseous abnormality. Degenerative changes   in the spine.      IMPRESSION: No interval change.    < end of copied text >       Patient discussed on morning rounds with Dr. Wilson    Operation / Date: 5/24/23 OPCABx3 (LIMA-LAD, SVG-OM-PDA), LVEF 55%    SUBJECTIVE ASSESSMENT:  72y Male assessed this AM. OOB ambulating. Pleasant, intermittently answering questions appropriatley. He had an episode of agitation/sundowning overnight. BM this AM. 100cc on IS.       Vital Signs Last 24 Hrs  T(C): 36.4 (01 Jun 2023 05:55), Max: 36.4 (01 Jun 2023 05:55)  T(F): 97.6 (01 Jun 2023 05:55), Max: 97.6 (01 Jun 2023 05:55)  HR: 66 (01 Jun 2023 13:23) (56 - 76)  BP: 134/62 (01 Jun 2023 13:23) (127/60 - 159/74)  BP(mean): 89 (01 Jun 2023 13:23) (85 - 101)  RR: 20 (01 Jun 2023 13:23) (15 - 20)  SpO2: 97% (01 Jun 2023 13:23) (94% - 98%)    Parameters below as of 01 Jun 2023 13:23  Patient On (Oxygen Delivery Method): room air      I&O's Detail    31 May 2023 07:01  -  01 Jun 2023 07:00  --------------------------------------------------------  IN:    Oral Fluid: 710 mL  Total IN: 710 mL    OUT:    Voided (mL): 1630 mL  Total OUT: 1630 mL    Total NET: -920 mL          CHEST TUBE:  No.  LYNETTE DRAIN:  No.  EPICARDIAL WIRES: Yes/No.  TIE DOWNS: Yes.  AVILEZ: No.    PHYSICAL EXAM:    Appearance: No acute distress.  Neurologic: AAOx2, intermittent delirium. No focal deficits.  Responds appropriately to verbal and physical stimuli; exhibits purposeful movement in all extremities.  HEENT:   MMM, PERRLA, EOMI	b/l  Neck: Supple   Cardiovascular: RRR, S1 S2. No m/r/g.  Respiratory: No acute respiratory distress. CTA b/l, no w/r/r.   Gastrointestinal:  Soft, non-tender, non-distended, + BS.	  Skin: No rashes. No ecchymoses. No cyanosis.  Extremities: Exhibits normal range of motion, no clubbing, cyanosis or edema.  Vascular: Peripheral pulses palpable 2+ bilaterally.   Incision Sites: MSI c/d/i, no click. Left leg evh site c/d/i.     LABS:                        9.1    10.61 )-----------( 386      ( 01 Jun 2023 05:30 )             29.0       COUMADIN:  No        06-01    140  |  104  |  23  ----------------------------<  267<H>  4.5   |  26  |  1.13    Ca    9.1      01 Jun 2023 05:30  Mg     2.1     06-01            MEDICATIONS  (STANDING):  aMIOdarone    Tablet 200 milliGRAM(s) Oral daily  aMIOdarone    Tablet   Oral   apixaban 5 milliGRAM(s) Oral every 12 hours  aspirin enteric coated 81 milliGRAM(s) Oral daily  atorvastatin 40 milliGRAM(s) Oral at bedtime  dextrose 5%. 1000 milliLiter(s) (50 mL/Hr) IV Continuous <Continuous>  dextrose 5%. 1000 milliLiter(s) (100 mL/Hr) IV Continuous <Continuous>  dextrose 50% Injectable 50 milliLiter(s) IV Push every 15 minutes  dextrose 50% Injectable 25 milliLiter(s) IV Push every 15 minutes  donepezil 10 milliGRAM(s) Oral <User Schedule>  donepezil 10 milliGRAM(s) Oral once  furosemide    Tablet 20 milliGRAM(s) Oral daily  glucagon  Injectable 1 milliGRAM(s) IntraMuscular once  guaiFENesin  milliGRAM(s) Oral every 12 hours  insulin glargine Injectable (LANTUS) 44 Unit(s) SubCutaneous at bedtime  insulin lispro (ADMELOG) corrective regimen sliding scale   SubCutaneous Before meals and at bedtime  insulin lispro Injectable (ADMELOG) 14 Unit(s) SubCutaneous three times a day before meals  metoprolol tartrate 50 milliGRAM(s) Oral every 12 hours  pantoprazole    Tablet 40 milliGRAM(s) Oral daily  polyethylene glycol 3350 17 Gram(s) Oral daily  potassium chloride    Tablet ER 10 milliEquivalent(s) Oral daily  senna 2 Tablet(s) Oral at bedtime  sodium chloride 0.9% lock flush 3 milliLiter(s) IV Push every 8 hours  sodium chloride 0.9%. 1000 milliLiter(s) (10 mL/Hr) IV Continuous <Continuous>    MEDICATIONS  (PRN):  acetaminophen     Tablet .. 650 milliGRAM(s) Oral every 6 hours PRN Mild Pain (1 - 3)  dextrose Oral Gel 15 Gram(s) Oral once PRN Blood Glucose LESS THAN 70 milliGRAM(s)/deciliter  oxyCODONE    IR 10 milliGRAM(s) Oral every 6 hours PRN Severe Pain (7 - 10)        RADIOLOGY & ADDITIONAL TESTS:  < from: Xray Chest 2 Views PA/Lat (05.31.23 @ 09:05) >  FINDINGS: Small left midlung and right lower lung discoid atelectasis,   grossly unchanged. No acute opacities. Small bilateral pleural effusions.   The cardiomediastinal silhouette is unchanged. Poststernotomy. Aortic   knob calcifications. No acute osseous abnormality. Degenerative changes   in the spine.      IMPRESSION: No interval change.    < end of copied text >       Patient discussed on morning rounds with Dr. Wilson    Operation / Date: 5/24/23 OPCABx3 (LIMA-LAD, SVG-OM-PDA), LVEF 55%    SUBJECTIVE ASSESSMENT:  72y Male assessed this AM. OOB ambulating. Pleasant, intermittently answering questions appropriatley. He had an episode of agitation/sundowning overnight. BM this AM. 1000cc on IS.       Vital Signs Last 24 Hrs  T(C): 36.4 (01 Jun 2023 05:55), Max: 36.4 (01 Jun 2023 05:55)  T(F): 97.6 (01 Jun 2023 05:55), Max: 97.6 (01 Jun 2023 05:55)  HR: 66 (01 Jun 2023 13:23) (56 - 76)  BP: 134/62 (01 Jun 2023 13:23) (127/60 - 159/74)  BP(mean): 89 (01 Jun 2023 13:23) (85 - 101)  RR: 20 (01 Jun 2023 13:23) (15 - 20)  SpO2: 97% (01 Jun 2023 13:23) (94% - 98%)    Parameters below as of 01 Jun 2023 13:23  Patient On (Oxygen Delivery Method): room air      I&O's Detail    31 May 2023 07:01  -  01 Jun 2023 07:00  --------------------------------------------------------  IN:    Oral Fluid: 710 mL  Total IN: 710 mL    OUT:    Voided (mL): 1630 mL  Total OUT: 1630 mL    Total NET: -920 mL          CHEST TUBE:  No.  LYNETTE DRAIN:  No.  EPICARDIAL WIRES: Yes/No.  TIE DOWNS: Yes.  AVILEZ: No.    PHYSICAL EXAM:    Appearance: No acute distress.  Neurologic: AAOx2, intermittent delirium. No focal deficits.  Responds appropriately to verbal and physical stimuli; exhibits purposeful movement in all extremities.  HEENT:   MMM, PERRLA, EOMI	b/l  Neck: Supple   Cardiovascular: RRR, S1 S2. No m/r/g.  Respiratory: No acute respiratory distress. CTA b/l, no w/r/r.   Gastrointestinal:  Soft, non-tender, non-distended, + BS.	  Skin: No rashes. No ecchymoses. No cyanosis.  Extremities: Exhibits normal range of motion, no clubbing, cyanosis or edema.  Vascular: Peripheral pulses palpable 2+ bilaterally.   Incision Sites: MSI c/d/i, no click. Left leg evh site c/d/i.     LABS:                        9.1    10.61 )-----------( 386      ( 01 Jun 2023 05:30 )             29.0       COUMADIN:  No        06-01    140  |  104  |  23  ----------------------------<  267<H>  4.5   |  26  |  1.13    Ca    9.1      01 Jun 2023 05:30  Mg     2.1     06-01            MEDICATIONS  (STANDING):  aMIOdarone    Tablet 200 milliGRAM(s) Oral daily  aMIOdarone    Tablet   Oral   apixaban 5 milliGRAM(s) Oral every 12 hours  aspirin enteric coated 81 milliGRAM(s) Oral daily  atorvastatin 40 milliGRAM(s) Oral at bedtime  dextrose 5%. 1000 milliLiter(s) (50 mL/Hr) IV Continuous <Continuous>  dextrose 5%. 1000 milliLiter(s) (100 mL/Hr) IV Continuous <Continuous>  dextrose 50% Injectable 50 milliLiter(s) IV Push every 15 minutes  dextrose 50% Injectable 25 milliLiter(s) IV Push every 15 minutes  donepezil 10 milliGRAM(s) Oral <User Schedule>  donepezil 10 milliGRAM(s) Oral once  furosemide    Tablet 20 milliGRAM(s) Oral daily  glucagon  Injectable 1 milliGRAM(s) IntraMuscular once  guaiFENesin  milliGRAM(s) Oral every 12 hours  insulin glargine Injectable (LANTUS) 44 Unit(s) SubCutaneous at bedtime  insulin lispro (ADMELOG) corrective regimen sliding scale   SubCutaneous Before meals and at bedtime  insulin lispro Injectable (ADMELOG) 14 Unit(s) SubCutaneous three times a day before meals  metoprolol tartrate 50 milliGRAM(s) Oral every 12 hours  pantoprazole    Tablet 40 milliGRAM(s) Oral daily  polyethylene glycol 3350 17 Gram(s) Oral daily  potassium chloride    Tablet ER 10 milliEquivalent(s) Oral daily  senna 2 Tablet(s) Oral at bedtime  sodium chloride 0.9% lock flush 3 milliLiter(s) IV Push every 8 hours  sodium chloride 0.9%. 1000 milliLiter(s) (10 mL/Hr) IV Continuous <Continuous>    MEDICATIONS  (PRN):  acetaminophen     Tablet .. 650 milliGRAM(s) Oral every 6 hours PRN Mild Pain (1 - 3)  dextrose Oral Gel 15 Gram(s) Oral once PRN Blood Glucose LESS THAN 70 milliGRAM(s)/deciliter  oxyCODONE    IR 10 milliGRAM(s) Oral every 6 hours PRN Severe Pain (7 - 10)        RADIOLOGY & ADDITIONAL TESTS:  < from: Xray Chest 2 Views PA/Lat (05.31.23 @ 09:05) >  FINDINGS: Small left midlung and right lower lung discoid atelectasis,   grossly unchanged. No acute opacities. Small bilateral pleural effusions.   The cardiomediastinal silhouette is unchanged. Poststernotomy. Aortic   knob calcifications. No acute osseous abnormality. Degenerative changes   in the spine.      IMPRESSION: No interval change.    < end of copied text >

## 2023-06-01 NOTE — PROGRESS NOTE ADULT - ASSESSMENT
73yo M with PMH of HTN, HLD, prior tobacco use, CVA, and Alzheimer's disease. He initially presented to [facility] on 5/24/23 & was transferred to [facility] for a surgical evaluation. On 5/24/23, he underwent an OPCAB x 3 (LIMA-LAD, SVG- OM-PDA) with Dr. Calloway without complication. He was transferred to the CTICU and extubated on POD #1 and weaned off pressor support. On POD #2, he developed AF RVR s/p amio boluses to PO load, and uptitration of BB therapy. Pleural CT removed and he was transferred to floor. On POD #3, his pacing wire removed. On POD #5, he received lasix secondary to increased congestion on CXR. Abimael removed & was started on eliquis. On POD 7 he was stable, awaiting d/c to [facility]. Today, POD 8, he had an episode of agitation/sundowning overnight which improved throughout the day. His home dose of aricept was resumed. He is pending a bed at [facility].     Plan:      Neurovascular: Hx Alzheimer's Dementia  - Delirium overnight   - Resumed Aricept 10mg q12hr   -Pain regimen: Lidocaine patch. PRN's: Tylenol 650mg q6hr.     Cardiovascular: CAD s/p  OPCAB x 3 (LIMA-LAD, SVG- OM-PDA)   -POD 8  - Continue: Lopressor 50mg q12hr, ASA, Lipitor 40mg, Lasix 20mg PO qday    - Post-op atrial fibrillation   - Continue Amiodarone 200mg daily & lopressor 50 q12  - Continue Eliquis 5mg BID  -Hemodynamically stable. HR controlled.  -Continue to monitor HR, BP, telemetry      Respiratory: Stable  -Oxygenating well on RA 97%  -Encourage coughing and use of IS 10x / hr while awake.  -CXR: Atelectasis small bilateral pleural effusions     GI: Stable  -PPX: Protonix  -PO Diet: Consistent Carb  -Bowel regimen: Miralax/Senna     Renal / : Stable  -Monitor renal function.  -Monitor I/O's.  -BUN/Cr: 23 & 1.13    Endocrine:  DM2  -A1c: 10.2  - Lantus 44 units qHS, Admelog 14 units pre-meal + ISS  - Appreciate endocrine recommendations   -TSH: 1.2    Hematologic: Stable  -H/H: 9.1 & 29    ID: Stable  -Temperature: 97.6  -WBC: 10.6  -Observe for SIRS/Sepsis Syndrome.    Prophylaxis:  - Eliquis 5mg BID  -Continue with SCD's    Disposition:  - Pending bed at [facility]

## 2023-06-01 NOTE — DISCHARGE NOTE PROVIDER - NSDCACTIVITY_GEN_ALL_CORE
Do not drive or operate machinery/Showering allowed/Do not make important decisions/Stairs allowed/Walking - Indoors allowed/No heavy lifting/straining/Follow Instructions Provided by your Surgical Team Do not drive or operate machinery/Do not make important decisions/Stairs allowed/Walking - Indoors allowed/No heavy lifting/straining/Follow Instructions Provided by your Surgical Team

## 2023-06-01 NOTE — DISCHARGE NOTE PROVIDER - NSDCCPCAREPLAN_GEN_ALL_CORE_FT
PRINCIPAL DISCHARGE DIAGNOSIS  Diagnosis: CAD (coronary artery disease)  Assessment and Plan of Treatment:

## 2023-06-01 NOTE — DISCHARGE NOTE PROVIDER - NSDCFUADDAPPT_GEN_ALL_CORE_FT
Dr. Calloway's office will call you will a follow up appointment day/time.  Dr. Calloway's office will call you will a follow up appointment day/time. If you do not receive a phone call please call [phone]. Dr. Calloway's office will call you will a follow up appointment day/time. If you do not receive a phone call please call [phone].    [facility] [phone] to make an appointment after discharge

## 2023-06-01 NOTE — DISCHARGE NOTE PROVIDER - CARE PROVIDER_API CALL
Robert Calloway  Thoracic and Cardiac Surgery  [address]  [place] [address]  Phone: [phone]  Fax: [phone]  Established Patient  Follow Up Time: 2 weeks    Federico Murcia  Interventional Cardiology  [address], [address]  [place] [address]  Phone: [phone]  Fax: [phone]  Established Patient  Follow Up Time: 2 weeks

## 2023-06-01 NOTE — PROGRESS NOTE ADULT - SUBJECTIVE AND OBJECTIVE BOX
SUBJECTIVE / INTERVAL HPI: Patient was seen and examined this morning.     CAPILLARY BLOOD GLUCOSE & INSULIN RECEIVED  211 mg/dL (05-31 @ 06:51)  209 mg/dL (05-31 @ 11:57)  117 mg/dL (05-31 @ 16:49)  86 mg/dL (05-31 @ 21:37)  148 mg/dL (05-31 @ 23:44)  261 mg/dL (06-01 @ 07:53)  186 mg/dL (06-01 @ 09:49)  107 mg/dL (06-01 @ 12:00)    REVIEW OF SYSTEMS  Constitutional:  Negative fever, chills or loss of appetite.  Eyes:  Negative blurry vision or double vision.  Cardiovascular:  Negative for chest pain or palpitations.  Respiratory:  Negative for cough, wheezing, or shortness of breath.    Gastrointestinal:  Negative for nausea, vomiting, diarrhea, constipation, or abdominal pain.  Genitourinary:  Negative frequency, urgency or dysuria.  Neurologic:  No headache, confusion, dizziness, lightheadedness.    PHYSICAL EXAM  Vital Signs Last 24 Hrs  T(C): 37.2 (01 Jun 2023 13:23), Max: 37.2 (01 Jun 2023 13:23)  T(F): 98.9 (01 Jun 2023 13:23), Max: 98.9 (01 Jun 2023 13:23)  HR: 66 (01 Jun 2023 13:23) (56 - 76)  BP: 134/62 (01 Jun 2023 13:23) (127/60 - 159/74)  BP(mean): 89 (01 Jun 2023 13:23) (85 - 101)  RR: 20 (01 Jun 2023 13:23) (15 - 20)  SpO2: 97% (01 Jun 2023 13:23) (94% - 99%)    Parameters below as of 01 Jun 2023 13:23  Patient On (Oxygen Delivery Method): room air    Constitutional: Awake, alert, in no acute distress.   HEENT: Normocephalic, atraumatic, MARSHALL.  Respiratory: Lungs clear to ausculation bilaterally.   Cardiovascular: regular rhythm, normal S1 and S2, no audible murmurs.   GI: soft, non-tender, non-distended, bowel sounds present.  Extremities: No lower extremity edema.  Psychiatric: AAO x 3. Normal affect/mood.     LABS  CBC - WBC/HGB/HTC/PLT: 10.61/9.1/29.0/386 (06-01-23)  BMP - Na/K/Cl/Bicarb/BUN/Cr/Gluc/AG/eGFR: 140/4.5/104/26/23/1.13/267/10/69 (06-01-23)  Ca - 9.1 (06-01-23)  Phos - -- (06-01-23)  Mg - 2.1 (06-01-23)  LFT - Alb/Tprot/Tbili/Dbili/AlkPhos/ALT/AST: 3.6/--/0.7/--/82/17/19 (05-30-23)  PT/aPTT/INR: 14.5/32.6/1.22 (05-29-23)   Thyroid Stimulating Hormone, Serum: 1.200 (05-20-23)    MEDICATIONS  MEDICATIONS  (STANDING):  aMIOdarone    Tablet   Oral   aMIOdarone    Tablet 200 milliGRAM(s) Oral daily  apixaban 5 milliGRAM(s) Oral every 12 hours  aspirin enteric coated 81 milliGRAM(s) Oral daily  atorvastatin 40 milliGRAM(s) Oral at bedtime  dextrose 5%. 1000 milliLiter(s) (50 mL/Hr) IV Continuous <Continuous>  dextrose 5%. 1000 milliLiter(s) (100 mL/Hr) IV Continuous <Continuous>  dextrose 50% Injectable 50 milliLiter(s) IV Push every 15 minutes  dextrose 50% Injectable 25 milliLiter(s) IV Push every 15 minutes  donepezil 10 milliGRAM(s) Oral once  donepezil 10 milliGRAM(s) Oral <User Schedule>  furosemide    Tablet 20 milliGRAM(s) Oral daily  glucagon  Injectable 1 milliGRAM(s) IntraMuscular once  guaiFENesin  milliGRAM(s) Oral every 12 hours  insulin glargine Injectable (LANTUS) 44 Unit(s) SubCutaneous at bedtime  insulin lispro (ADMELOG) corrective regimen sliding scale   SubCutaneous Before meals and at bedtime  insulin lispro Injectable (ADMELOG) 14 Unit(s) SubCutaneous three times a day before meals  lidocaine   4% Patch 1 Patch Transdermal every 24 hours  metoprolol tartrate 50 milliGRAM(s) Oral every 12 hours  pantoprazole    Tablet 40 milliGRAM(s) Oral daily  polyethylene glycol 3350 17 Gram(s) Oral daily  potassium chloride    Tablet ER 10 milliEquivalent(s) Oral daily  senna 2 Tablet(s) Oral at bedtime  sodium chloride 0.9% lock flush 3 milliLiter(s) IV Push every 8 hours  sodium chloride 0.9%. 1000 milliLiter(s) (10 mL/Hr) IV Continuous <Continuous>    MEDICATIONS  (PRN):  acetaminophen     Tablet .. 650 milliGRAM(s) Oral every 6 hours PRN Mild Pain (1 - 3)  dextrose Oral Gel 15 Gram(s) Oral once PRN Blood Glucose LESS THAN 70 milliGRAM(s)/deciliter    ASSESSMENT / RECOMMENDATIONS    A1C: 10.2 %  BUN: 23  Creatinine: 1.13  GFR: 69  Weight: 69.4  BMI: 26.2  EF:     # Type 2 diabetes mellitus with hyperglycemia  - Please continue lantus *** units at bedtime.   - Continue lispro *** units before each meal.  - Continue lispro moderate / low dose sliding scale before meals and at bedtime.  - Patient's fingerstick glucose goal is 100-180 mg/dL.    - Discharge recommendations to be discussed.   - Patient can follow up at discharge with [facility] by calling [phone] to make an appointment.      Case discussed with Dr. Kennedy. Primary team updated.       Josue Ibarra    Endocrinology Fellow    Service Pager: [phone]    SUBJECTIVE / INTERVAL HPI: Patient was seen and examined this morning. Wife was not at bedside during evaluation to provide collateral information regarding diet. He missed his lantus dose last night, but received NPH this morning. Blood glucose have been intermittently above target.     CAPILLARY BLOOD GLUCOSE & INSULIN RECEIVED  237 mg/dL (05-30 @ 21:37) ? 38 units of lantus + 4 units of lispro sliding scale.   211 mg/dL (05-31 @ 06:51) ? 12 units of lispro + 4 units of lispro sliding scale.   209 mg/dL (05-31 @ 11:57) ? 12 units of lispro + 4 units of lispro sliding scale.   117 mg/dL (05-31 @ 16:49) ? 12 units of lispro.  86 mg/dL (05-31 @ 21:37) ? Ø (As per WALTER Porter).   148 mg/dL (05-31 @ 23:44) ? Ø  261 mg/dL (06-01 @ 07:53) ? 12 units of lispro + 6 units of lispro sliding scale.   186 mg/dL (06-01 @ 09:49) ? 15 units of NPH.     REVIEW OF SYSTEMS  Constitutional:  (+) Decreased appetite. Negative fever, chills.   Cardiovascular:  Negative for chest pain or palpitations.  Respiratory:  Negative for cough, wheezing, or shortness of breath.    Gastrointestinal:  Negative for nausea, vomiting, diarrhea, constipation, or abdominal pain.  Neurologic:  (+) Impaired memory. No headache, dizziness, lightheadedness.    PHYSICAL EXAM  Vital Signs Last 24 Hrs  T(C): 37.2 (01 Jun 2023 13:23), Max: 37.2 (01 Jun 2023 13:23)  T(F): 98.9 (01 Jun 2023 13:23), Max: 98.9 (01 Jun 2023 13:23)  HR: 66 (01 Jun 2023 13:23) (56 - 76)  BP: 134/62 (01 Jun 2023 13:23) (127/60 - 159/74)  BP(mean): 89 (01 Jun 2023 13:23) (85 - 101)  RR: 20 (01 Jun 2023 13:23) (15 - 20)  SpO2: 97% (01 Jun 2023 13:23) (94% - 99%)    Parameters below as of 01 Jun 2023 13:23  Patient On (Oxygen Delivery Method): room air    Constitutional: Awake, alert, male, in no acute distress.   HEENT: Normocephalic, atraumatic, MARSHALL.  Respiratory: Lungs clear to ausculation bilaterally.   Cardiovascular: regular rhythm, normal S1 and S2, no audible murmurs. (+) sternotomy wound.   GI: soft, non-tender, non-distended, bowel sounds present.  Extremities: No lower extremity edema.    LABS  CBC - WBC/HGB/HTC/PLT: 10.61/9.1/29.0/386 (06-01-23)  BMP - Na/K/Cl/Bicarb/BUN/Cr/Gluc/AG/eGFR: 140/4.5/104/26/23/1.13/267/10/69 (06-01-23)  Ca - 9.1 (06-01-23)  Phos - -- (06-01-23)  Mg - 2.1 (06-01-23)  LFT - Alb/Tprot/Tbili/Dbili/AlkPhos/ALT/AST: 3.6/--/0.7/--/82/17/19 (05-30-23)  PT/aPTT/INR: 14.5/32.6/1.22 (05-29-23)   Thyroid Stimulating Hormone, Serum: 1.200 (05-20-23)    MEDICATIONS  MEDICATIONS  (STANDING):  aMIOdarone    Tablet   Oral   aMIOdarone    Tablet 200 milliGRAM(s) Oral daily  apixaban 5 milliGRAM(s) Oral every 12 hours  aspirin enteric coated 81 milliGRAM(s) Oral daily  atorvastatin 40 milliGRAM(s) Oral at bedtime  dextrose 5%. 1000 milliLiter(s) (50 mL/Hr) IV Continuous <Continuous>  dextrose 5%. 1000 milliLiter(s) (100 mL/Hr) IV Continuous <Continuous>  dextrose 50% Injectable 50 milliLiter(s) IV Push every 15 minutes  dextrose 50% Injectable 25 milliLiter(s) IV Push every 15 minutes  donepezil 10 milliGRAM(s) Oral once  donepezil 10 milliGRAM(s) Oral <User Schedule>  furosemide    Tablet 20 milliGRAM(s) Oral daily  glucagon  Injectable 1 milliGRAM(s) IntraMuscular once  guaiFENesin  milliGRAM(s) Oral every 12 hours  insulin glargine Injectable (LANTUS) 44 Unit(s) SubCutaneous at bedtime  insulin lispro (ADMELOG) corrective regimen sliding scale   SubCutaneous Before meals and at bedtime  insulin lispro Injectable (ADMELOG) 14 Unit(s) SubCutaneous three times a day before meals  lidocaine   4% Patch 1 Patch Transdermal every 24 hours  metoprolol tartrate 50 milliGRAM(s) Oral every 12 hours  pantoprazole    Tablet 40 milliGRAM(s) Oral daily  polyethylene glycol 3350 17 Gram(s) Oral daily  potassium chloride    Tablet ER 10 milliEquivalent(s) Oral daily  senna 2 Tablet(s) Oral at bedtime  sodium chloride 0.9% lock flush 3 milliLiter(s) IV Push every 8 hours  sodium chloride 0.9%. 1000 milliLiter(s) (10 mL/Hr) IV Continuous <Continuous>    MEDICATIONS  (PRN):  acetaminophen     Tablet .. 650 milliGRAM(s) Oral every 6 hours PRN Mild Pain (1 - 3)  dextrose Oral Gel 15 Gram(s) Oral once PRN Blood Glucose LESS THAN 70 milliGRAM(s)/deciliter    ASSESSMENT / RECOMMENDATIONS  Mr. Fox is a 65-year-old male with a past medical history of type 2 diabetes mellitus, hypertension, hyperlipidemia, CVA (2016, no residual deficits) and Alzheimer's dementia who presented to [facility] emergency department complaining of chest pain. He was found to have NSTEMI and underwent cardiac cath revealing coronary artery disease. He was then transferred to [facility] for further management, now s/p CABG (5/24/2023). Endocrinology following for recommendations regarding diabetes management.     A1C: 10.2 %  BUN: 23  Creatinine: 1.13  GFR: 69  Weight: 69.4  BMI: 26.2    # Type 2 diabetes mellitus with hyperglycemia  - Blood glucose levels have been above target. He is planned for discharge to rehab later today.   - Upon discharge, he may continue with lantus 42 units at bedtime and lispro 10 units before meals.   - Patient can follow up at discharge with [facility] by calling [phone] to make an appointment.      Case discussed with Dr. Kennedy. Primary team updated.       Josue Ibarra    Endocrinology Fellow    Service Pager: [phone]

## 2023-06-01 NOTE — PROGRESS NOTE ADULT - PROVIDER SPECIALTY LIST ADULT
CT Surgery
Critical Care
Critical Care
Endocrinology
Endocrinology
CT Surgery
Endocrinology
CT Surgery
CT Surgery
Critical Care
Endocrinology
Endocrinology

## 2023-06-01 NOTE — DISCHARGE NOTE PROVIDER - NSDCCPTREATMENT_GEN_ALL_CORE_FT
PRINCIPAL PROCEDURE  Procedure: CABG, with JA  Findings and Treatment: LIMA-LAD SVG-OM-PDA EF 55%

## 2023-06-01 NOTE — DISCHARGE NOTE PROVIDER - HOSPITAL COURSE
Patient discussed on morning rounds with Dr. Wilson     Operation Date: 5/24/23 OPCAB x3 (LIMA-LAD, SVG - OM - PDA) EF 55%    Primary Surgeon/Attending MD: Dr. Calloway     Referring Physician: Dr. Murcia   _ _ _ _ _ _ _ _ _ _ _ _  HOSPITAL COURSE:  73yo M with PMH of HTN, HLD, prior tobacco use, CVA, and Alzheimer's disease. He initially presented to [facility] on 5/24/23 & was transferred to [facility] for a surgical evaluation. On 5/24/23, he underwent an OPCAB x 3 (LIMA-LAD, SVG- OM-PDA) with Dr. Calloway without complication. He was transferred to the CTICU and extubated on POD #1 and weaned off pressor support. On POD #2, he developed AF RVR s/p amio boluses to PO load, and uptitration of BB therapy. Pleural CT removed and he was transferred to floor. On POD #3, his pacing wire removed. On POD #5, he received lasix secondary to increased congestion on CXR. Abimael removed & was started on eliquis. On POD 7 he was stable, awaiting d/c to [facility]. Today, POD 8, he had an episode of agitation/sundowning overnight which improved throughout the day. His home dose of aricept was resumed. As per discussion with Dr. Wilson, patient is stable for d/c today to [facility].    [facility]: [facility] [place]  Address: [phone] [place] [address]  Phone: [phone]    _ _ _ _ _ _ _ _ _ _ _ _  DISCHARGE PHYSICAL EXAM:  Appearance: No acute distress.  Neurologic: AAOx2, intermittent delirium. No focal deficits.  Responds appropriately to verbal and physical stimuli; exhibits purposeful movement in all extremities.  HEENT:   MMM, PERRLA, EOMI	b/l  Neck: Supple  Cardiovascular: RRR, S1 S2. No m/r/g.  Respiratory: No acute respiratory distress. CTA b/l, no w/r/r.   Gastrointestinal:  Soft, non-tender, non-distended, + BS.	  Skin: No rashes. No ecchymoses. No cyanosis.  Extremities: Exhibits normal range of motion, no clubbing, cyanosis or edema.  Vascular: Peripheral pulses palpable 2+ bilaterally.  Incisions: MSI c/d/i, no click. Left EVH site c/d/i.   _ _ _ _ _ _ _ _ _ _ _ _  REMOVAL CHECKLIST:        [ X] Epicardial wires          [ X] Stitches/tie downs,   If no, why? ______  _ _ _ _ _ _ _ _ _ _ _ _   MEDICATION DISCHARGE CHECKLIST    CABG        [ X] Aspirin, [  ] Contraindicated, Reason_______________________________        [ ] Plavix, [  X] Contraindicated, Reason: On Eliquis         [ X] Statin, [  ] Contraindicated, Reason_______________________________        [ X] Lasix , [  ] Contraindicated, Reason_______________________________         [ X] Beta-Blocker, [  ] Contraindicated, Reason_______________________________          Anticoagulation        [X ] NOAC, [ ] Reason: Eliquis 5mg BID 2/2 post op afib               Cost/Insurance barriers addressed: No - d/c to rehab   _ _ _ _ _ _ _ _ _ _ _ _  RELEVANT LABS/IMAGING:  < from: Xray Chest 2 Views PA/Lat (05.31.23 @ 09:05) >    FINDINGS: Small left midlung and right lower lung discoid atelectasis,   grossly unchanged. No acute opacities. Small bilateral pleural effusions.   The cardiomediastinal silhouette is unchanged. Poststernotomy. Aortic   knob calcifications. No acute osseous abnormality. Degenerative changes   in the spine.      IMPRESSION: No interval change.    < end of copied text >      _ _ _ _ _ _ _ _ _ _ _ _  Over 35 minutes was spent with the patient reviewing the discharge material including medications, follow up appointments, recovery, concerning symptoms, and how to contact their health care providers if they have questions   Patient discussed on morning rounds with Dr. Wilson     Operation Date: 5/24/23 OPCAB x3 (LIMA-LAD, SVG - OM - PDA) EF 55%    Primary Surgeon/Attending MD: Dr. Calloway     Referring Physician: Dr. Murcia   _ _ _ _ _ _ _ _ _ _ _ _  HOSPITAL COURSE:  71yo M with PMH of HTN, HLD, prior tobacco use, CVA, and Alzheimer's disease. He initially presented to [facility] on 5/24/23 & was transferred to [facility] for a surgical evaluation. On 5/24/23, he underwent an OPCAB x 3 (LIMA-LAD, SVG- OM-PDA) with Dr. Calloway without complication. He was transferred to the CTICU and extubated on POD #1 and weaned off pressor support. On POD #2, he developed AF RVR s/p amio boluses to PO load, and uptitration of BB therapy. Pleural CT removed and he was transferred to floor. On POD #3, his pacing wire removed. On POD #5, he received lasix secondary to increased congestion on CXR. Abimael removed & was started on eliquis. On POD 7 he was stable, awaiting d/c to [facility]. Today, POD 8, he had an episode of agitation/sundowning overnight which improved throughout the day. His home dose of aricept was resumed. Pt was scheduled for DC on 6/1 however due to transport issues DC to [facility] was arranged for 6/2. Pt was deemed medically stable for discharge by Dr. Wilson. At time of DC pt had a post op BM, tolerated a PO diet and ambulated. Pt denies dizziness, vision changes, chest pain, palpitations, shortness of breath, cough, n/v/d, extremity swelling, calf tenderness.     [facility]: [facility] [place]  Address: [address] [facility], [place] [address]  Phone: [phone]    _ _ _ _ _ _ _ _ _ _ _ _  DISCHARGE PHYSICAL EXAM:  Appearance: No acute distress.  Neurologic: AAOx2, intermittent delirium. No focal deficits.  Responds appropriately to verbal and physical stimuli; exhibits purposeful movement in all extremities.  HEENT:   MMM, PERRLA, EOMI	b/l  Neck: Supple  Cardiovascular: RRR, S1 S2. No m/r/g.  Respiratory: No acute respiratory distress. CTA b/l, no w/r/r.   Gastrointestinal:  Soft, non-tender, non-distended, + BS.	  Skin: No rashes. No ecchymoses. No cyanosis.  Extremities: Exhibits normal range of motion, no clubbing, cyanosis or edema.  Vascular: Peripheral pulses palpable 2+ bilaterally.  Incisions: MSI c/d/i, no click. Left EVH site c/d/i.   _ _ _ _ _ _ _ _ _ _ _ _  REMOVAL CHECKLIST:        [ X] Epicardial wires          [ X] Stitches/tie downs,   If no, why? ______  _ _ _ _ _ _ _ _ _ _ _ _   MEDICATION DISCHARGE CHECKLIST    CABG        [ X] Aspirin, [  ] Contraindicated, Reason_______________________________        [ ] Plavix, [  X] Contraindicated, Reason: On Eliquis         [ X] Statin, [  ] Contraindicated, Reason_______________________________        [ X] Lasix , [  ] Contraindicated, Reason_______________________________         [ X] Beta-Blocker, [  ] Contraindicated, Reason_______________________________          Anticoagulation        [X ] NOAC, [ ] Reason: Eliquis 5mg BID 2/2 post op afib               Cost/Insurance barriers addressed: No - d/c to rehab   _ _ _ _ _ _ _ _ _ _ _ _  RELEVANT LABS/IMAGING:  < from: Xray Chest 2 Views PA/Lat (05.31.23 @ 09:05) >    FINDINGS: Small left midlung and right lower lung discoid atelectasis,   grossly unchanged. No acute opacities. Small bilateral pleural effusions.   The cardiomediastinal silhouette is unchanged. Poststernotomy. Aortic   knob calcifications. No acute osseous abnormality. Degenerative changes   in the spine.      IMPRESSION: No interval change.    < end of copied text >  _ _ _ _ _ _ _ _ _ _ _ _  Over 35 minutes was spent with the patient reviewing the discharge material including medications, follow up appointments, recovery, concerning symptoms, and how to contact their health care providers if they have questions

## 2023-06-01 NOTE — DISCHARGE NOTE PROVIDER - PROVIDER TOKENS
PROVIDER:[TOKEN:[[id number]],FOLLOWUP:[2 weeks],ESTABLISHEDPATIENT:[T]],PROVIDER:[TOKEN:[[id number]],FOLLOWUP:[2 weeks],ESTABLISHEDPATIENT:[T]]

## 2023-06-02 ENCOUNTER — TRANSCRIPTION ENCOUNTER (OUTPATIENT)
Age: 73
End: 2023-06-02

## 2023-06-02 VITALS
OXYGEN SATURATION: 98 % | SYSTOLIC BLOOD PRESSURE: 137 MMHG | HEART RATE: 56 BPM | DIASTOLIC BLOOD PRESSURE: 63 MMHG | RESPIRATION RATE: 18 BRPM

## 2023-06-02 LAB
ANION GAP SERPL CALC-SCNC: 10 MMOL/L — SIGNIFICANT CHANGE UP (ref 5–17)
BUN SERPL-MCNC: 24 MG/DL — HIGH (ref 7–23)
CALCIUM SERPL-MCNC: 8.8 MG/DL — SIGNIFICANT CHANGE UP (ref 8.4–10.5)
CHLORIDE SERPL-SCNC: 106 MMOL/L — SIGNIFICANT CHANGE UP (ref 96–108)
CO2 SERPL-SCNC: 25 MMOL/L — SIGNIFICANT CHANGE UP (ref 22–31)
CREAT SERPL-MCNC: 1.11 MG/DL — SIGNIFICANT CHANGE UP (ref 0.5–1.3)
EGFR: 70 ML/MIN/1.73M2 — SIGNIFICANT CHANGE UP
GLUCOSE BLDC GLUCOMTR-MCNC: 210 MG/DL — HIGH (ref 70–99)
GLUCOSE SERPL-MCNC: 222 MG/DL — HIGH (ref 70–99)
HCT VFR BLD CALC: 30.4 % — LOW (ref 39–50)
HGB BLD-MCNC: 9.5 G/DL — LOW (ref 13–17)
MAGNESIUM SERPL-MCNC: 2.2 MG/DL — SIGNIFICANT CHANGE UP (ref 1.6–2.6)
MCHC RBC-ENTMCNC: 29.1 PG — SIGNIFICANT CHANGE UP (ref 27–34)
MCHC RBC-ENTMCNC: 31.3 GM/DL — LOW (ref 32–36)
MCV RBC AUTO: 93.3 FL — SIGNIFICANT CHANGE UP (ref 80–100)
NRBC # BLD: 0 /100 WBCS — SIGNIFICANT CHANGE UP (ref 0–0)
PLATELET # BLD AUTO: 419 K/UL — HIGH (ref 150–400)
POTASSIUM SERPL-MCNC: 4.4 MMOL/L — SIGNIFICANT CHANGE UP (ref 3.5–5.3)
POTASSIUM SERPL-SCNC: 4.4 MMOL/L — SIGNIFICANT CHANGE UP (ref 3.5–5.3)
RBC # BLD: 3.26 M/UL — LOW (ref 4.2–5.8)
RBC # FLD: 14.7 % — HIGH (ref 10.3–14.5)
SODIUM SERPL-SCNC: 141 MMOL/L — SIGNIFICANT CHANGE UP (ref 135–145)
WBC # BLD: 11.45 K/UL — HIGH (ref 3.8–10.5)
WBC # FLD AUTO: 11.45 K/UL — HIGH (ref 3.8–10.5)

## 2023-06-02 PROCEDURE — 85027 COMPLETE CBC AUTOMATED: CPT

## 2023-06-02 PROCEDURE — 85025 COMPLETE CBC W/AUTO DIFF WBC: CPT

## 2023-06-02 PROCEDURE — 82330 ASSAY OF CALCIUM: CPT

## 2023-06-02 PROCEDURE — 86900 BLOOD TYPING SEROLOGIC ABO: CPT

## 2023-06-02 PROCEDURE — 83735 ASSAY OF MAGNESIUM: CPT

## 2023-06-02 PROCEDURE — 83880 ASSAY OF NATRIURETIC PEPTIDE: CPT

## 2023-06-02 PROCEDURE — 86923 COMPATIBILITY TEST ELECTRIC: CPT

## 2023-06-02 PROCEDURE — 84132 ASSAY OF SERUM POTASSIUM: CPT

## 2023-06-02 PROCEDURE — 84295 ASSAY OF SERUM SODIUM: CPT

## 2023-06-02 PROCEDURE — 83036 HEMOGLOBIN GLYCOSYLATED A1C: CPT

## 2023-06-02 PROCEDURE — 85014 HEMATOCRIT: CPT

## 2023-06-02 PROCEDURE — 82962 GLUCOSE BLOOD TEST: CPT

## 2023-06-02 PROCEDURE — 86803 HEPATITIS C AB TEST: CPT

## 2023-06-02 PROCEDURE — C1751: CPT

## 2023-06-02 PROCEDURE — 94640 AIRWAY INHALATION TREATMENT: CPT

## 2023-06-02 PROCEDURE — 85610 PROTHROMBIN TIME: CPT

## 2023-06-02 PROCEDURE — 71045 X-RAY EXAM CHEST 1 VIEW: CPT

## 2023-06-02 PROCEDURE — 93880 EXTRACRANIAL BILAT STUDY: CPT

## 2023-06-02 PROCEDURE — 97162 PT EVAL MOD COMPLEX 30 MIN: CPT

## 2023-06-02 PROCEDURE — 93307 TTE W/O DOPPLER COMPLETE: CPT

## 2023-06-02 PROCEDURE — 81003 URINALYSIS AUTO W/O SCOPE: CPT

## 2023-06-02 PROCEDURE — 80053 COMPREHEN METABOLIC PANEL: CPT

## 2023-06-02 PROCEDURE — 36415 COLL VENOUS BLD VENIPUNCTURE: CPT

## 2023-06-02 PROCEDURE — 71046 X-RAY EXAM CHEST 2 VIEWS: CPT

## 2023-06-02 PROCEDURE — P9045: CPT

## 2023-06-02 PROCEDURE — 94002 VENT MGMT INPAT INIT DAY: CPT

## 2023-06-02 PROCEDURE — 93005 ELECTROCARDIOGRAM TRACING: CPT

## 2023-06-02 PROCEDURE — C1889: CPT

## 2023-06-02 PROCEDURE — 82947 ASSAY GLUCOSE BLOOD QUANT: CPT

## 2023-06-02 PROCEDURE — 97116 GAIT TRAINING THERAPY: CPT

## 2023-06-02 PROCEDURE — 80048 BASIC METABOLIC PNL TOTAL CA: CPT

## 2023-06-02 PROCEDURE — 86850 RBC ANTIBODY SCREEN: CPT

## 2023-06-02 PROCEDURE — 82803 BLOOD GASES ANY COMBINATION: CPT

## 2023-06-02 PROCEDURE — 94150 VITAL CAPACITY TEST: CPT

## 2023-06-02 PROCEDURE — 86891 AUTOLOGOUS BLOOD OP SALVAGE: CPT

## 2023-06-02 PROCEDURE — 80061 LIPID PANEL: CPT

## 2023-06-02 PROCEDURE — 84100 ASSAY OF PHOSPHORUS: CPT

## 2023-06-02 PROCEDURE — 70450 CT HEAD/BRAIN W/O DYE: CPT

## 2023-06-02 PROCEDURE — 84443 ASSAY THYROID STIM HORMONE: CPT

## 2023-06-02 PROCEDURE — 85730 THROMBOPLASTIN TIME PARTIAL: CPT

## 2023-06-02 PROCEDURE — 86901 BLOOD TYPING SEROLOGIC RH(D): CPT

## 2023-06-02 PROCEDURE — 85576 BLOOD PLATELET AGGREGATION: CPT

## 2023-06-02 PROCEDURE — 85520 HEPARIN ASSAY: CPT

## 2023-06-02 PROCEDURE — 84484 ASSAY OF TROPONIN QUANT: CPT

## 2023-06-02 RX ADMIN — Medication 650 MILLIGRAM(S): at 08:55

## 2023-06-02 RX ADMIN — LIDOCAINE 1 PATCH: 4 CREAM TOPICAL at 04:00

## 2023-06-02 RX ADMIN — APIXABAN 5 MILLIGRAM(S): 2.5 TABLET, FILM COATED ORAL at 06:42

## 2023-06-02 RX ADMIN — Medication 20 MILLIGRAM(S): at 06:43

## 2023-06-02 RX ADMIN — Medication 10 UNIT(S): at 07:08

## 2023-06-02 RX ADMIN — Medication 50 MILLIGRAM(S): at 06:42

## 2023-06-02 RX ADMIN — AMIODARONE HYDROCHLORIDE 200 MILLIGRAM(S): 400 TABLET ORAL at 06:42

## 2023-06-02 RX ADMIN — Medication 4: at 07:08

## 2023-06-02 RX ADMIN — SODIUM CHLORIDE 3 MILLILITER(S): 9 INJECTION INTRAMUSCULAR; INTRAVENOUS; SUBCUTANEOUS at 06:43

## 2023-06-02 NOTE — DISCHARGE NOTE NURSING/CASE MANAGEMENT/SOCIAL WORK - NSCORESITESY/N_GEN_A_CORE_RD
No
[FreeTextEntry1] : Continue to watch diet and exercise\par Food diary\par Labs all stable\par Sono negative\par

## 2023-06-02 NOTE — DISCHARGE NOTE NURSING/CASE MANAGEMENT/SOCIAL WORK - NSDCFUADDAPPT_GEN_ALL_CORE_FT
Dr. Calloway's office will call you will a follow up appointment day/time. If you do not receive a phone call please call [phone].    [facility] [phone] to make an appointment after discharge

## 2023-06-02 NOTE — DISCHARGE NOTE NURSING/CASE MANAGEMENT/SOCIAL WORK - PATIENT PORTAL LINK FT
You can access the FollowMyHealth Patient Portal offered by [facility] by registering at the following website: [email or web address]. By joining [facility]’s FollowMyHealth portal, you will also be able to view your health information using other applications (apps) compatible with our system.

## 2023-06-06 ENCOUNTER — TRANSCRIPTION ENCOUNTER (OUTPATIENT)
Age: 73
End: 2023-06-06

## 2023-06-06 ENCOUNTER — APPOINTMENT (OUTPATIENT)
Dept: CARDIOTHORACIC SURGERY | Facility: CLINIC | Age: 73
End: 2023-06-06

## 2023-06-08 ENCOUNTER — TRANSCRIPTION ENCOUNTER (OUTPATIENT)
Age: 73
End: 2023-06-08

## 2023-06-08 RX ORDER — DONEPEZIL HYDROCHLORIDE 10 MG/1
10 TABLET ORAL
Refills: 0 | Status: ACTIVE | COMMUNITY

## 2023-06-12 ENCOUNTER — TRANSCRIPTION ENCOUNTER (OUTPATIENT)
Age: 73
End: 2023-06-12

## 2023-06-20 ENCOUNTER — TRANSCRIPTION ENCOUNTER (OUTPATIENT)
Age: 73
End: 2023-06-20

## 2023-06-21 RX ORDER — CLOPIDOGREL BISULFATE 75 MG/1
1 TABLET, FILM COATED ORAL
Refills: 0 | DISCHARGE

## 2023-06-21 RX ORDER — ATORVASTATIN CALCIUM 80 MG/1
1 TABLET, FILM COATED ORAL
Refills: 0 | DISCHARGE

## 2023-06-21 RX ORDER — ICOSAPENT ETHYL 500 MG/1
2 CAPSULE, LIQUID FILLED ORAL
Refills: 0 | DISCHARGE

## 2023-06-21 RX ORDER — DONEPEZIL HYDROCHLORIDE 10 MG/1
1 TABLET, FILM COATED ORAL
Refills: 0 | DISCHARGE

## 2023-06-21 RX ORDER — ISOSORBIDE MONONITRATE 60 MG/1
1 TABLET, EXTENDED RELEASE ORAL
Refills: 0 | DISCHARGE

## 2023-06-21 RX ORDER — INSULIN DETEMIR 100/ML (3)
0.5 INSULIN PEN (ML) SUBCUTANEOUS
Refills: 0 | DISCHARGE

## 2023-06-21 RX ORDER — INSULIN ASPART 100 [IU]/ML
0 INJECTION, SOLUTION SUBCUTANEOUS
Refills: 0 | DISCHARGE

## 2023-06-21 RX ORDER — DAPAGLIFLOZIN AND METFORMIN HYDROCHLORIDE 10; 1000 MG/1; MG/1
1 TABLET, FILM COATED, EXTENDED RELEASE ORAL
Refills: 0 | DISCHARGE

## 2023-06-21 RX ORDER — TELMISARTAN AND HYDROCHLOROTHIAZIDE 40; 12.5 MG/1; MG/1
1 TABLET ORAL
Refills: 0 | DISCHARGE

## 2023-06-23 NOTE — REASON FOR VISIT
[de-identified] : OPCAB x 3 [de-identified] : 5/24/23 [de-identified] : Intraop uncomplicated. Extubated on POD 1. POD #2, he developed AF RVR s/p amio boluses to PO load, and of BB therapy. Pleural CT removed and he was transferred to floor.  POD #5, he received lasix secondary to increased congestion on CXR. Abimael removed & was started on eliquis. POD 7 he was stable, awaiting d/c to \par [facility]. POD 8, he had an episode of agitation/sundowning overnight which improved throughout the day. His home dose of aricept was resumed.  Discharged on 6/2 to [facility]: [facility] [place]. \par Chest xray XX\par

## 2023-06-24 PROBLEM — G30.9 ALZHEIMER'S DISEASE, UNSPECIFIED: Chronic | Status: ACTIVE | Noted: 2023-05-20

## 2023-06-24 PROBLEM — E11.9 TYPE 2 DIABETES MELLITUS WITHOUT COMPLICATIONS: Chronic | Status: ACTIVE | Noted: 2023-05-20

## 2023-06-24 PROBLEM — E78.5 HYPERLIPIDEMIA, UNSPECIFIED: Chronic | Status: ACTIVE | Noted: 2023-05-20

## 2023-06-24 PROBLEM — I10 ESSENTIAL (PRIMARY) HYPERTENSION: Chronic | Status: ACTIVE | Noted: 2023-05-20

## 2023-06-24 PROBLEM — I63.9 CEREBRAL INFARCTION, UNSPECIFIED: Chronic | Status: ACTIVE | Noted: 2023-05-20

## 2023-06-27 ENCOUNTER — NON-APPOINTMENT (OUTPATIENT)
Age: 73
End: 2023-06-27

## 2023-06-27 ENCOUNTER — APPOINTMENT (OUTPATIENT)
Dept: CARDIOTHORACIC SURGERY | Facility: CLINIC | Age: 73
End: 2023-06-27

## 2023-07-26 ENCOUNTER — APPOINTMENT (OUTPATIENT)
Dept: HEART AND VASCULAR | Facility: CLINIC | Age: 73
End: 2023-07-26
Payer: MEDICARE

## 2023-07-26 VITALS
HEART RATE: 73 BPM | BODY MASS INDEX: 26.29 KG/M2 | DIASTOLIC BLOOD PRESSURE: 73 MMHG | WEIGHT: 154 LBS | HEIGHT: 64 IN | SYSTOLIC BLOOD PRESSURE: 160 MMHG

## 2023-07-26 VITALS — SYSTOLIC BLOOD PRESSURE: 150 MMHG | DIASTOLIC BLOOD PRESSURE: 72 MMHG

## 2023-07-26 DIAGNOSIS — Z95.1 PRESENCE OF AORTOCORONARY BYPASS GRAFT: ICD-10-CM

## 2023-07-26 DIAGNOSIS — I21.4 NON-ST ELEVATION (NSTEMI) MYOCARDIAL INFARCTION: ICD-10-CM

## 2023-07-26 PROCEDURE — 99204 OFFICE O/P NEW MOD 45 MIN: CPT | Mod: 25

## 2023-07-26 PROCEDURE — 93000 ELECTROCARDIOGRAM COMPLETE: CPT

## 2023-07-26 RX ORDER — METOPROLOL TARTRATE 25 MG/1
25 TABLET, FILM COATED ORAL TWICE DAILY
Qty: 60 | Refills: 3 | Status: ACTIVE | COMMUNITY

## 2023-07-26 RX ORDER — ATORVASTATIN CALCIUM 40 MG/1
40 TABLET, FILM COATED ORAL
Qty: 30 | Refills: 0 | Status: COMPLETED | COMMUNITY
End: 2023-07-26

## 2023-07-26 RX ORDER — LIDOCAINE 4% 40 MG/G
4 PATCH TOPICAL
Refills: 0 | Status: COMPLETED | COMMUNITY
End: 2023-07-26

## 2023-07-26 RX ORDER — INSULIN LISPRO 100 [IU]/ML
100 INJECTION, SOLUTION INTRAVENOUS; SUBCUTANEOUS
Refills: 0 | Status: ACTIVE | COMMUNITY

## 2023-07-26 RX ORDER — GLIPIZIDE 10 MG/1
10 TABLET ORAL
Refills: 0 | Status: ACTIVE | COMMUNITY

## 2023-07-26 RX ORDER — ATORVASTATIN CALCIUM 40 MG/1
40 TABLET, FILM COATED ORAL
Qty: 90 | Refills: 3 | Status: ACTIVE | COMMUNITY
Start: 2023-07-26 | End: 1900-01-01

## 2023-07-26 RX ORDER — INSULIN LISPRO 100 [IU]/ML
100 INJECTION, SOLUTION INTRAVENOUS; SUBCUTANEOUS 3 TIMES DAILY
Refills: 0 | Status: COMPLETED | COMMUNITY
End: 2023-07-26

## 2023-07-26 RX ORDER — INSULIN GLARGINE-YFGN 100 [IU]/ML
100 INJECTION, SOLUTION SUBCUTANEOUS AT BEDTIME
Refills: 0 | Status: COMPLETED | COMMUNITY
End: 2023-07-26

## 2023-07-26 RX ORDER — ASPIRIN ENTERIC COATED TABLETS 81 MG 81 MG/1
81 TABLET, DELAYED RELEASE ORAL
Qty: 90 | Refills: 1 | Status: ACTIVE | COMMUNITY
Start: 2023-07-26 | End: 1900-01-01

## 2023-07-26 RX ORDER — AMIODARONE HYDROCHLORIDE 200 MG/1
200 TABLET ORAL DAILY
Qty: 30 | Refills: 0 | Status: COMPLETED | COMMUNITY
End: 2023-07-26

## 2023-07-26 RX ORDER — PANTOPRAZOLE 40 MG/1
40 TABLET, DELAYED RELEASE ORAL DAILY
Qty: 30 | Refills: 0 | Status: COMPLETED | COMMUNITY
End: 2023-07-26

## 2023-07-26 RX ORDER — LOSARTAN POTASSIUM 25 MG/1
25 TABLET, FILM COATED ORAL DAILY
Qty: 1 | Refills: 2 | Status: ACTIVE | COMMUNITY
Start: 2023-07-26 | End: 1900-01-01

## 2023-07-26 RX ORDER — GUAIFENESIN 600 MG
600 TABLET, EXTENDED RELEASE ORAL
Refills: 0 | Status: COMPLETED | COMMUNITY
End: 2023-07-26

## 2023-07-26 RX ORDER — ASPIRIN 81 MG
81 TABLET, DELAYED RELEASE (ENTERIC COATED) ORAL DAILY
Qty: 30 | Refills: 1 | Status: COMPLETED | COMMUNITY
End: 2023-07-26

## 2023-07-26 RX ORDER — METOPROLOL TARTRATE 50 MG/1
50 TABLET, FILM COATED ORAL
Qty: 60 | Refills: 0 | Status: COMPLETED | COMMUNITY
End: 2023-07-26

## 2023-07-26 RX ORDER — ICOSAPENT ETHYL 1000 MG/1
1 CAPSULE ORAL TWICE DAILY
Qty: 120 | Refills: 3 | Status: COMPLETED | COMMUNITY
End: 2023-07-26

## 2023-07-26 NOTE — PHYSICAL EXAM
[Well Developed] : well developed [Well Nourished] : well nourished [No Acute Distress] : no acute distress [No Murmur] : no murmur [Clear Lung Fields] : clear lung fields [Good Air Entry] : good air entry [No Respiratory Distress] : no respiratory distress  [Moves all extremities] : moves all extremities [No Focal Deficits] : no focal deficits [Normal Speech] : normal speech [Alert and Oriented] : alert and oriented [Normal memory] : normal memory

## 2023-07-26 NOTE — HISTORY OF PRESENT ILLNESS
[FreeTextEntry1] : 72 year old male former smoker with PMHX of CAD NSTEMI ( 04/2023 ) and sp 3V CABG ( 5/24/2023 LIMA - LAD, SVG -OM - PDA), questionable AFIB ( eliquis ), HTN, HLD, CVA ( unsteady gait 2016 ), Alzheimers Dis here to establish cardiac care. \par \par Patient follows up with PCP Dr Sanya Vaughn and Cardiologist Dr Duke Elias.\par \par Patient was evaluated at [facility] for possible NSTEMI. Patient unaware of what type of testing was done. He was discharged on ASA and IMDUR. \par \par Patient was then evaluated at [facility] 5/2023 for chest pain and was transferred to [facility] for 3V CABG 5/24/23. Since discharge patient was in outpatient rehabilitation and was discharged a few weeks ago. \par \par There appears to be some confusion regarding medications. Patient has not been on statins nor aspirin. There was a possible aspirin allergy in the past ( family cannot recall reaction ) but has has taken it since without complications. Also, family reports diagnosis of AFIB earlier this year and he was on Amiodarone and Eliquis. Amiodarone has since been stopped by Dr Elias and plans for Eliquis to be stopped end of August. Patient is not on Plavix\par \par PT is aware of self only. Slightly confused of where ( hospital in [place] vs outpatient office ) and time ( 2022 vs 2023 )\par \par No chest pains. He has dyspnea at 2 blocks, baseline per wife. Trace edema but no orthopnea. No bleeding complications. No recent illness.

## 2023-07-26 NOTE — DISCUSSION/SUMMARY
[FreeTextEntry1] : EK/min, NSST-T changes\par \par 72 year old male former smoker with PMHX of CAD NSTEMI ( 2023 ) and sp 3V CABG ( 2023 LIMA - LAD, SVG -OM - PDA), questionable AFIB ( eliquis ), HTN, HLD, CVA ( unsteady gait 2016 ), Alzheimers Dis here to establish cardiac care. \par \par CAD: stable, cont Eliquis for now. No exertional chest pains. Baseline edema.  Will restart statin and ASA after Eliquis d/c'ed/\par AFIB - possibly perioperative - event monitor - if NSR might d/c Eliquis\par HTN: Elevated, start losartan 25mg po daily, cont metoprolol\par HDL: LDL goal <70. Currently at 37 (2021 ) - restart atorvastatin 40mg po daily\par ECHO on f/u\par RTC 1 month\par \par

## 2023-07-26 NOTE — REVIEW OF SYSTEMS
[Dyspnea on exertion] : dyspnea during exertion [Lower Ext Edema] : lower extremity edema [Confusion] : confusion was observed [Fever] : no fever [Chills] : no chills [SOB] : no shortness of breath [Chest Discomfort] : no chest discomfort [Leg Claudication] : no intermittent leg claudication [Palpitations] : no palpitations [Orthopnea] : no orthopnea [PND] : no PND [Syncope] : no syncope [Nausea] : no nausea [Vomiting] : no vomiting [Heartburn] : no heartburn [Diarrhea] : diarrhea [Dizziness] : no dizziness [Numbness (Hypoesthesia)] : no numbness [Weakness] : no weakness [Speech Disturbance] : no speech disturbance [Easy Bleeding] : no tendency for easy bleeding

## 2023-07-27 LAB
ALBUMIN SERPL ELPH-MCNC: 4.5 G/DL
ALP BLD-CCNC: 158 U/L
ALT SERPL-CCNC: 15 U/L
ANION GAP SERPL CALC-SCNC: 11 MMOL/L
AST SERPL-CCNC: 14 U/L
BILIRUB SERPL-MCNC: 0.2 MG/DL
BUN SERPL-MCNC: 18 MG/DL
CALCIUM SERPL-MCNC: 9.5 MG/DL
CHLORIDE SERPL-SCNC: 103 MMOL/L
CHOLEST SERPL-MCNC: 173 MG/DL
CO2 SERPL-SCNC: 26 MMOL/L
CREAT SERPL-MCNC: 1.29 MG/DL
EGFR: 59 ML/MIN/1.73M2
ESTIMATED AVERAGE GLUCOSE: 160 MG/DL
GLUCOSE SERPL-MCNC: 308 MG/DL
HBA1C MFR BLD HPLC: 7.2 %
HDLC SERPL-MCNC: 35 MG/DL
LDLC SERPL CALC-MCNC: 85 MG/DL
NONHDLC SERPL-MCNC: 138 MG/DL
POTASSIUM SERPL-SCNC: 4.4 MMOL/L
PROT SERPL-MCNC: 7.1 G/DL
SODIUM SERPL-SCNC: 141 MMOL/L
TRIGL SERPL-MCNC: 324 MG/DL

## 2023-08-24 NOTE — PROGRESS NOTE ADULT - ASSESSMENT
Subjective:     Ganesh Malik is a 12 y.o. male here with mother. Patient brought in for Sore Throat (Pt woke up with a sore throat yesterday. Today his throat is worst. Pt seizures gets worse when he is sick.), Cough, and Fever (Pt mom reports that the pt might of had a fever yesterday.)  History obtained by patient and mother.      History of Present Illness:  Sore Throat  This is a new problem. The current episode started yesterday. Progression since onset: worse today. Associated symptoms include congestion, coughing, a fever (subj yest) and a sore throat. Pertinent negatives include no myalgias or vomiting.       Review of Systems   Constitutional:  Positive for fever (subj yest).   HENT:  Positive for congestion and sore throat.    Respiratory:  Positive for cough.    Gastrointestinal:  Negative for diarrhea and vomiting.   Musculoskeletal:  Negative for myalgias.   Neurological:  Positive for seizures (worsen when sick).       Objective:     Physical Exam  Constitutional:       General: He is not in acute distress.     Appearance: He is ill-appearing. He is not toxic-appearing.   HENT:      Right Ear: Tympanic membrane normal.      Left Ear: Tympanic membrane normal.      Nose: Congestion present.      Mouth/Throat:      Lips: Pink.      Mouth: Mucous membranes are moist.      Pharynx: Posterior oropharyngeal erythema present. No oropharyngeal exudate.      Comments: PND  Eyes:      Conjunctiva/sclera: Conjunctivae normal.   Cardiovascular:      Rate and Rhythm: Normal rate and regular rhythm.      Heart sounds: No murmur heard.  Pulmonary:      Effort: Pulmonary effort is normal.      Breath sounds: Normal breath sounds. No wheezing or rhonchi.   Musculoskeletal:      Cervical back: Neck supple.   Lymphadenopathy:      Cervical: No cervical adenopathy.   Skin:     General: Skin is warm.      Coloration: Skin is not pale.      Findings: No rash.   Neurological:      Mental Status: He is alert.   Psychiatric:          Behavior: Behavior is cooperative.         Assessment:     1. Pharyngitis, unspecified etiology        Plan:     Orders Placed This Encounter   Procedures    POCT Strep A, Molecular    POCT COVID-19 Rapid Screening     Testing negative.  Discussed viral etiology, usual course, appropriate symptomatic treatment, and reasons to return.       73 y/o M with PMHx of former smoker, HTN, HLD, CVA (2016, no residual deficits), Alzheimers who presented with NSTEMI to [facility] and transferred to [facility] for CABG. On 5/24/23 pt underwent a OPCAB x 3 LIMA-LAD, SVG- OM-PDA  EF 55% with Dr. Calloway. Uneventful intraoperative course. Extubated early AM POD 1. On low dose jose alberto, weaned off with volume. Overnight POD 1-2 Afib RVR s/p amio boluses, PO load, and uptitrated beta blocker. POD2 pleural chest tube removed and transferred to [facility] for further care. POD3 pt is feeling well. PW removed and 1 yamilka remains. Pushing ambulation and IS use. Small increase in Cr, hydrating.    Plan:  Neurovascular:   -Pain well controlled with current regimen. PRN's: acetaminophen, oxycodone 5-10mg   -hx CVA 2016, no residual deficits   -hx of Alzheimers    Cardiovascular:   -POD4 OPCABx3 (LIMA-LAD, SVG-OM-PDA), EF 55%     -continue with asa, statin (stopped Plavix today per Dr. Calloway)    -postoperative AF       -was in NSR most of yesterday, back in AF today 110s.      -starting eliquis per Dr. Calloway       -continue with Amio, metoprolol tartrate 12.5mg Q6 hours  -Hemodynamically stable.   -Monitor: BP, HR, tele    Respiratory:   -Oxygenating well on room air  -Encourage continued use of IS 10x/hr and frequent ambulation  -CXR: no acute pathology no PTX     GI:  -GI PPX: senna, miralax   -PO Diet: consistent carbs/no snacks   -Bowel Regimen: senna, miralax    Renal / :  -Continue to monitor renal function: BUN/Cr 26/1.07 (downtrended from yesterday after hydration)   -Monitor I/O's daily     Endocrine:    -Hx of DM     -A1c: 10.2     -continue with lantus 38, lispro 7, RISS   -no hx of thyroid dx     -TSH: 1.2     Hematologic:  -CBC: H/H- 8.6/27.6  -Coagulation Panel: WNL     ID:  -Temperature: afebrile   -CBC: WBC- 10.7  -Continue to observe for SIRS/Sepsis Syndrome.    Prophylaxis:  -DVT prophylaxis with eliquis  -Continue with SCD's b/l while patient is at rest     Dispo:  -pending [facility]

## 2023-08-30 ENCOUNTER — APPOINTMENT (OUTPATIENT)
Dept: HEART AND VASCULAR | Facility: CLINIC | Age: 73
End: 2023-08-30
Payer: MEDICARE

## 2023-08-30 VITALS
DIASTOLIC BLOOD PRESSURE: 76 MMHG | HEIGHT: 64 IN | OXYGEN SATURATION: 95 % | SYSTOLIC BLOOD PRESSURE: 145 MMHG | HEART RATE: 2 BPM | WEIGHT: 158 LBS | BODY MASS INDEX: 26.98 KG/M2

## 2023-08-30 PROCEDURE — 99214 OFFICE O/P EST MOD 30 MIN: CPT | Mod: 25

## 2023-08-30 PROCEDURE — 93306 TTE W/DOPPLER COMPLETE: CPT

## 2023-08-30 PROCEDURE — 93000 ELECTROCARDIOGRAM COMPLETE: CPT

## 2023-08-30 RX ORDER — APIXABAN 5 MG/1
5 TABLET, FILM COATED ORAL
Qty: 60 | Refills: 3 | Status: COMPLETED | COMMUNITY
End: 2023-08-30

## 2023-08-30 NOTE — DISCUSSION/SUMMARY
[FreeTextEntry1] : EKG: SB 44/min, NSST-T changes ECHO: LV EF normal, impaired relaxation, trace MR, thickened AV, no AS  72 year old male former smoker with PMHX of CAD NSTEMI ( 04/2023 ) and sp 3V CABG ( 5/24/2023 LIMA - LAD, SVG -OM - PDA), questionable AFIB ( eliquis ), HTN, HLD, CVA ( unsteady gait 2016 ), Alzheimers Dis here to establish cardiac care.   CAD: stable, off Eliquis. No exertional chest pains. Baseline edema.  Will restart statin and ASA after Eliquis d/c'ed/ AFIB - possibly perioperative - event monitor - if NSR on ASA HTN: Elevated, cont losartan 25mg po daily, cont metoprolol, but decrease to 25mg po daily b/o bradycardia HDL: LDL goal <70. Currently at 37 (5/2021 ) - atorvastatin 40mg po daily RTC 3 months

## 2023-08-30 NOTE — HISTORY OF PRESENT ILLNESS
[FreeTextEntry1] : 72 year old male former smoker with PMHX of CAD NSTEMI ( 04/2023 ) and sp 3V CABG ( 5/24/2023 LIMA - LAD, SVG -OM - PDA), questionable AFIB ( eliquis ), HTN, HLD, CVA ( unsteady gait 2016 ), Alzheimers Dis here to establish cardiac care. \par  \par  Patient follows up with PCP Dr Sanya Vaughn and Cardiologist Dr Duke Elias.\par  \par  Patient was evaluated at [facility] for possible NSTEMI. Patient unaware of what type of testing was done. He was discharged on ASA and IMDUR. \par  \par  Patient was then evaluated at [facility] 5/2023 for chest pain and was transferred to [facility] for 3V CABG 5/24/23. Since discharge patient was in outpatient rehabilitation and was discharged a few weeks ago. \par  \par  There appears to be some confusion regarding medications. Patient has not been on statins nor aspirin. There was a possible aspirin allergy in the past ( family cannot recall reaction ) but has has taken it since without complications. Also, family reports diagnosis of AFIB earlier this year and he was on Amiodarone and Eliquis. Amiodarone has since been stopped by Dr Elias and plans for Eliquis to be stopped end of August. Patient is not on Plavix\par  \par  PT is aware of self only. Slightly confused of where ( hospital in [place] vs outpatient office ) and time ( 2022 vs 2023 )\par  \par  No chest pains. He has dyspnea at 2 blocks, baseline per wife. Trace edema but no orthopnea. No bleeding complications. No recent illness.

## 2024-01-24 ENCOUNTER — APPOINTMENT (OUTPATIENT)
Dept: HEART AND VASCULAR | Facility: CLINIC | Age: 74
End: 2024-01-24
Payer: MEDICARE

## 2024-01-24 VITALS
WEIGHT: 157 LBS | DIASTOLIC BLOOD PRESSURE: 73 MMHG | HEART RATE: 60 BPM | SYSTOLIC BLOOD PRESSURE: 148 MMHG | BODY MASS INDEX: 26.8 KG/M2 | HEIGHT: 64 IN

## 2024-01-24 DIAGNOSIS — I25.10 ATHEROSCLEROTIC HEART DISEASE OF NATIVE CORONARY ARTERY W/OUT ANGINA PECTORIS: ICD-10-CM

## 2024-01-24 DIAGNOSIS — I10 ESSENTIAL (PRIMARY) HYPERTENSION: ICD-10-CM

## 2024-01-24 DIAGNOSIS — E78.00 PURE HYPERCHOLESTEROLEMIA, UNSPECIFIED: ICD-10-CM

## 2024-01-24 DIAGNOSIS — E11.9 TYPE 2 DIABETES MELLITUS W/OUT COMPLICATIONS: ICD-10-CM

## 2024-01-24 PROCEDURE — 93000 ELECTROCARDIOGRAM COMPLETE: CPT

## 2024-01-24 PROCEDURE — 99214 OFFICE O/P EST MOD 30 MIN: CPT | Mod: 25

## 2024-01-24 RX ORDER — POTASSIUM CHLORIDE 750 MG/1
10 CAPSULE, EXTENDED RELEASE ORAL
Qty: 30 | Refills: 0 | Status: DISCONTINUED | COMMUNITY
End: 2024-01-24

## 2024-01-24 RX ORDER — DAPAGLIFLOZIN AND METFORMIN HYDROCHLORIDE 10; 500 MG/1; MG/1
10-500 TABLET, FILM COATED, EXTENDED RELEASE ORAL
Refills: 0 | Status: ACTIVE | COMMUNITY

## 2024-01-24 RX ORDER — MIRTAZAPINE 7.5 MG/1
7.5 TABLET, FILM COATED ORAL
Refills: 0 | Status: DISCONTINUED | COMMUNITY
End: 2024-01-24

## 2024-01-24 RX ORDER — FUROSEMIDE 20 MG/1
20 TABLET ORAL DAILY
Qty: 30 | Refills: 0 | Status: DISCONTINUED | COMMUNITY
End: 2024-01-24

## 2024-01-24 NOTE — DISCUSSION/SUMMARY
[FreeTextEntry1] : EKG: SB 59/min, NSST-T changes ECHO: LV EF normal, impaired relaxation, trace MR, thickened AV, no AS  72 year old male former smoker with PMHX of CAD NSTEMI ( 04/2023 ) and sp 3V CABG ( 5/24/2023 LIMA - LAD, SVG -OM - PDA), questionable AFIB ( eliquis ), HTN, HLD, CVA ( unsteady gait 2016 ), Alzheimers Dis here to establish cardiac care.   CAD: stable, off Eliquis. No exertional chest pains. Baseline edema.  Will restart statin and ASA after Eliquis d/c'ed/ AFIB - possibly perioperative - event monitor - if NSR on ASA HTN: Elevated, cont losartan 25mg po daily, cont metoprolol, but decrease to 25mg po daily b/o bradycardia HDL: LDL goal <70. Currently at 37 (5/2021 ) - atorvastatin 40mg po daily RTC 3 months

## 2024-01-24 NOTE — PHYSICAL EXAM
[Well Developed] : well developed [No Acute Distress] : no acute distress [Well Nourished] : well nourished [No Murmur] : no murmur [Clear Lung Fields] : clear lung fields [Good Air Entry] : good air entry [No Respiratory Distress] : no respiratory distress  [Moves all extremities] : moves all extremities [No Focal Deficits] : no focal deficits [Normal Speech] : normal speech [Alert and Oriented] : alert and oriented [Normal memory] : normal memory

## 2024-01-24 NOTE — HISTORY OF PRESENT ILLNESS
[FreeTextEntry1] : 72 year old male former smoker with PMHX of CAD NSTEMI ( 04/2023 ) and sp 3V CABG ( 5/24/2023 LIMA - LAD, SVG -OM - PDA), questionable AFIB ( eliquis ), HTN, HLD, CVA ( unsteady gait 2016 ), Alzheimers Dis here for f/u   Patient was evaluated at [facility] for possible NSTEMI. Patient unaware of what type of testing was done. He was discharged on ASA and IMDUR.   Patient was then evaluated at [facility] 5/2023 for chest pain and was transferred to [facility] for 3V CABG 5/24/23. Since discharge patient was in outpatient rehabilitation and was discharged a few weeks ago.   There appears to be some confusion regarding medications. Patient has not been on statins nor aspirin. There was a possible aspirin allergy in the past ( family cannot recall reaction ) but has has taken it since without complications. Also, family reports diagnosis of AFIB earlier this year and he was on Amiodarone and Eliquis. Amiodarone has since been stopped by Dr Elias and plans for Eliquis to be stopped end of August. Patient is not on Plavix  PT is aware of self only. Slightly confused of where ( hospital in [place] vs outpatient office ) and time ( 2022 vs 2023 )  No chest pains. He has dyspnea at 2 blocks, baseline per wife. Trace edema but no orthopnea. No bleeding complications. No recent illness.

## 2024-01-25 LAB
ALBUMIN SERPL ELPH-MCNC: 4 G/DL
ALP BLD-CCNC: 168 U/L
ALT SERPL-CCNC: 22 U/L
ANION GAP SERPL CALC-SCNC: 13 MMOL/L
AST SERPL-CCNC: 20 U/L
BILIRUB SERPL-MCNC: 0.3 MG/DL
BUN SERPL-MCNC: 18 MG/DL
CALCIUM SERPL-MCNC: 9.3 MG/DL
CHLORIDE SERPL-SCNC: 104 MMOL/L
CHOLEST SERPL-MCNC: 104 MG/DL
CO2 SERPL-SCNC: 22 MMOL/L
CREAT SERPL-MCNC: 1.12 MG/DL
EGFR: 69 ML/MIN/1.73M2
ESTIMATED AVERAGE GLUCOSE: 306 MG/DL
GLUCOSE SERPL-MCNC: 315 MG/DL
HBA1C MFR BLD HPLC: 12.3 %
HCT VFR BLD CALC: 46.8 %
HDLC SERPL-MCNC: 32 MG/DL
HGB BLD-MCNC: 15.2 G/DL
LDLC SERPL CALC-MCNC: 51 MG/DL
MCHC RBC-ENTMCNC: 28.6 PG
MCHC RBC-ENTMCNC: 32.5 GM/DL
MCV RBC AUTO: 88 FL
NONHDLC SERPL-MCNC: 72 MG/DL
PLATELET # BLD AUTO: 229 K/UL
POTASSIUM SERPL-SCNC: 4.4 MMOL/L
PROT SERPL-MCNC: 6.6 G/DL
RBC # BLD: 5.32 M/UL
RBC # FLD: 13.3 %
SODIUM SERPL-SCNC: 140 MMOL/L
TRIGL SERPL-MCNC: 112 MG/DL
TSH SERPL-ACNC: 0.79 UIU/ML
WBC # FLD AUTO: 8.44 K/UL

## 2024-04-20 NOTE — DISCHARGE NOTE NURSING/CASE MANAGEMENT/SOCIAL WORK - WILL THE PATIENT ACCEPT THE PFIZER COVID-19 VACCINE IF ELIGIBLE AND IT IS AVAILABLE?
Intubation    Date/Time: 4/20/2024 9:07 AM    Performed by: Sly Barajas CRNA  Authorized by: Ilda Belle MD    Intubation:     Induction:  Intravenous    Mask Ventilation:  Not attempted    Attempts:  1    Attempted By:  CRNA    Method of Intubation:  Other (see comments)    Difficult Airway Encountered?: No      Complications:  None    Airway Device:  Supraglottic airway/LMA    Placement Verified By:  Capnometry    Complicating Factors:  None    Findings Post-Intubation:  BS equal bilateral and atraumatic/condition of teeth unchanged  Notes:      LMA size #4       Yes

## 2024-04-24 ENCOUNTER — APPOINTMENT (OUTPATIENT)
Dept: HEART AND VASCULAR | Facility: CLINIC | Age: 74
End: 2024-04-24

## (undated) DEVICE — DRAPE TOWEL BLUE 17" X 24"

## (undated) DEVICE — GLV 6 PROTEXIS (WHITE)

## (undated) DEVICE — CATH TRIOX OXIMETRY 8F 3 LUMENS

## (undated) DEVICE — DRSG BIOPATCH DISK W CHG 1" W 4.0MM HOLE

## (undated) DEVICE — SUT ETHIBOND 0 18" TIES

## (undated) DEVICE — SUT PROLENE 8-0 24" BV175-6

## (undated) DEVICE — SUT VICRYL 1 36" CTX UNDYED

## (undated) DEVICE — VASOVIEW HEMOPRO ENDO SYSTEM

## (undated) DEVICE — GAUZE SPONGE 12PLY DMT MT 8X4

## (undated) DEVICE — PACK PROC CV DRAPE

## (undated) DEVICE — Device

## (undated) DEVICE — SUT STAINLESS STEEL 7 4-18" CCS

## (undated) DEVICE — CATH CV TRAY INSR ST UNIV

## (undated) DEVICE — GLV 6.5 PROTEXIS (WHITE)

## (undated) DEVICE — SUT VICRYL PLUS 0 27" CT

## (undated) DEVICE — LAP PAD 18 X 18"

## (undated) DEVICE — SUMP INTRACARDIAC/PERICARDIAL 20FR 1/4" ADULT

## (undated) DEVICE — SUT NUROLON 1 18" OS-8 (POP-OFF)

## (undated) DEVICE — KNIFE ALCON STANDARD FULL HANDLE 15 DEG (PINK)

## (undated) DEVICE — ELCTR BOVIE TIP BLADE INSULATED 3" EDGE

## (undated) DEVICE — PACING CABLE (BROWN) A/V TEMP SCREW DOWN 12FT

## (undated) DEVICE — ACROBAT I-POSITIONER

## (undated) DEVICE — SUT SILK 0 18" CT-1 (POP-OFF)

## (undated) DEVICE — PACK OPEN HEART LNX

## (undated) DEVICE — SUCTION CATH ARGYLE WHISTLE TIP 14FR STRAIGHT PACKED

## (undated) DEVICE — SUT SILK 2-0 30" SH

## (undated) DEVICE — BLADE ETHICON HARMONIC SYNERGY HOOK TIP 3MM 4CM-9CM

## (undated) DEVICE — BLADE SCALPEL SAFETY #11 WITH PLASTIC GREEN HANDLE

## (undated) DEVICE — DRSG TRACH DRAINAGE 4X4

## (undated) DEVICE — SUT DOUBLE 6 WIRE STERNAL

## (undated) DEVICE — DRAPE TOWEL WHITE 17" X 24"

## (undated) DEVICE — SUT VICRYL 0 27" CT

## (undated) DEVICE — DRAPE U POLY BLUE 60X72"

## (undated) DEVICE — BLADE SCALPEL SAFETY #10 WITH PLASTIC GREEN HANDLE

## (undated) DEVICE — SUT MONOCRYL 4-0 27" PS-2 UNDYED

## (undated) DEVICE — CATH NG SALEM SUMP 16FR

## (undated) DEVICE — ELCTR REM POLYHESIVE ADULT PT RETURN 15FT

## (undated) DEVICE — GOWN TRIMAX XXL

## (undated) DEVICE — SUT PROLENE 3-0 36" SH-1

## (undated) DEVICE — SUT SILK 6-0 30" C-1

## (undated) DEVICE — DRSG ACE BANDAGE 6" LF STERILE

## (undated) DEVICE — DRSG DERMABOND 0.7ML

## (undated) DEVICE — GLV 7.5 PROTEXIS (WHITE)

## (undated) DEVICE — DRAPE FLUID WARMER 44 X 66"

## (undated) DEVICE — ELCTR STRYKER EXTENSION SUCTION TIP 125MM

## (undated) DEVICE — ELCTR STRYKER NEPTUNE SMOKE EVACUATION PENCIL (GREEN)

## (undated) DEVICE — SUT SILK 2-0 18" SH (POP-OFF)

## (undated) DEVICE — DRAIN RESERVOIR FOR JACKSON PRATT 100CC CARDINAL

## (undated) DEVICE — TUBING BRAT 2 SUCTION ASSEMBLY TWIST LOCK

## (undated) DEVICE — ELCTR BOVIE TIP BLADE INSULATED 4" EDGE

## (undated) DEVICE — SUT VICRYL 2-0 27" CT-1

## (undated) DEVICE — SUT STAINLESS STEEL 6 4-18" CCS

## (undated) DEVICE — GETINGE VASOVIEW 7 ENDOSCOPIC VESSEL HARVESTING SYSTEM

## (undated) DEVICE — CHEST DRAIN PLEUR-EVAC DRY/WET ADULT-PEDS SINGLE (QUICK)

## (undated) DEVICE — PACK PROCEDURE HARVEST SMARTPREP APC-60

## (undated) DEVICE — FOLEY TRAY 16FR 5CC LF LUBRISIL ADVANCE TEMP CLOSED

## (undated) DEVICE — SUT PROLENE BV 130-5 8-0

## (undated) DEVICE — DRAPE PROBE COVER 5" X 96"

## (undated) DEVICE — DRAPE IOBAN 33" X 23"

## (undated) DEVICE — PREP SCRUB BRUSH W CHG 4%

## (undated) DEVICE — WARMING BLANKET FULL ADULT

## (undated) DEVICE — SUT PROLENE 6-0 30" RB-2

## (undated) DEVICE — DRSG ALLEVYN LIFE 6 X 6 (PINK)

## (undated) DEVICE — SUT PROLENE 7-0 24" BV-1

## (undated) DEVICE — DRSG MEPILEX 10 X 25CM (4 X 10") AG

## (undated) DEVICE — GLV 7 PROTEXIS (WHITE)

## (undated) DEVICE — SUT PROLENE 7-0 24" BV175-6

## (undated) DEVICE — TUBING STRYKER PNEUMOCLEAR HIGH FLOW

## (undated) DEVICE — AROS VESSEL CLAMP SINGLE LARGE (YELLOW) 120G

## (undated) DEVICE — TUBING SMOKE EVAC 3/8" X 10FT FOR NEPTUNE

## (undated) DEVICE — NDL BLUNT 18G LOOP VESSEL MAXI WHITE

## (undated) DEVICE — SUT ETHIBOND 4-0 12-18"

## (undated) DEVICE — BLOWER MISTER AXIUS WITH IV SET

## (undated) DEVICE — ACROBAT I-STABILIZER

## (undated) DEVICE — DRSG ACE BANDAGE 6"

## (undated) DEVICE — CATH IV SAFE BC 24G X 0.75" (YELLOW)

## (undated) DEVICE — DRAPE PROBE COVER LATEX FREE 3X96"

## (undated) DEVICE — SOL ANTI FOG